# Patient Record
Sex: FEMALE | Race: WHITE | NOT HISPANIC OR LATINO | Employment: OTHER | URBAN - METROPOLITAN AREA
[De-identification: names, ages, dates, MRNs, and addresses within clinical notes are randomized per-mention and may not be internally consistent; named-entity substitution may affect disease eponyms.]

---

## 2017-01-31 ENCOUNTER — TELEPHONE (OUTPATIENT)
Dept: FAMILY MEDICINE | Facility: CLINIC | Age: 67
End: 2017-01-31

## 2017-01-31 NOTE — TELEPHONE ENCOUNTER
----- Message from Bin Molina sent at 1/31/2017 12:04 PM CST -----  Contact: pt  Pt is requesting a callback, needs to schedule an appt with doctor  Call Back#[phone]  Thanks

## 2017-01-31 NOTE — TELEPHONE ENCOUNTER
Trying to wing it being depressed quite  A bit. She started back on wellbutrin from a year ago. i made her an appointment with gisell soler.

## 2017-02-02 ENCOUNTER — OFFICE VISIT (OUTPATIENT)
Dept: FAMILY MEDICINE | Facility: CLINIC | Age: 67
End: 2017-02-02
Payer: MEDICARE

## 2017-02-02 VITALS
WEIGHT: 132.5 LBS | HEART RATE: 72 BPM | BODY MASS INDEX: 24.38 KG/M2 | DIASTOLIC BLOOD PRESSURE: 80 MMHG | HEIGHT: 62 IN | SYSTOLIC BLOOD PRESSURE: 128 MMHG

## 2017-02-02 DIAGNOSIS — F41.9 ANXIETY: ICD-10-CM

## 2017-02-02 DIAGNOSIS — F32.A DEPRESSION, UNSPECIFIED DEPRESSION TYPE: Primary | ICD-10-CM

## 2017-02-02 PROCEDURE — 99214 OFFICE O/P EST MOD 30 MIN: CPT | Mod: S$GLB,,, | Performed by: NURSE PRACTITIONER

## 2017-02-02 RX ORDER — BUPROPION HCL 300 MG
300 TABLET, EXTENDED RELEASE 24 HR ORAL DAILY
Qty: 30 TABLET | Refills: 6 | Status: SHIPPED | OUTPATIENT
Start: 2017-02-02 | End: 2017-05-11 | Stop reason: SDUPTHER

## 2017-02-06 NOTE — PROGRESS NOTES
"Subjective:       Patient ID: Isela Giraldo is a 66 y.o. female.    Chief Complaint: Depression    HPI states she is having a lot of anxiety and depression. Feels that she is not coping well at this time. Feeling overwhelmed. She has been on wellbutrin in the past and had some old 150mg tablets at home so she started taking those. States they are at least 3 years old so she does not know how effective they are because she does not feel  Much better. She states that when she was on the wellbutrin it did help a lot. She was taking 300mg in the past. It has been about a year since she last took it. States she has current 300mg prescription at home and just wanted opinion on starting back on this medication. She take xanax, low dose, at bedtime prn to help her sleep. States it helps her mind shut down so she is not constantly thinking about stuff. She denies thoughts of wanting to harm herself or others. See ROS.    The following portion of the patients history was reviewed and updated as appropriate: allergies, current medications, past medical and surgical history. Past social history and problem list reviewed. Family PMH and Past social history reviewed. Tobacco, Illicit drug use reviewed.     Review of Systems    Constitutional: No fatigue or fever    Respiratory:   No SOB, Wheezing, cough  Cardiovascular:   No CP, Palpitations  Gastrointestinal:   No N/V/D. No abdominal pain, weight stable. Appetite good.   Musculoskeletal:   No joint pain  No change in gait or coordination. .  Neurological:   No dizziness. No headaches  Hematological: No abnormal bruising or bleeding    Psychiatric/Behavioral Negative for suicidal ideas.  Positive for feelings of depression. No thoughts of wanting to harm self or others. feeing anxious, overwhelmed, stressed.     Objective:       Visit Vitals    /80    Pulse 72    Ht 5' 2" (1.575 m)    Wt 60.1 kg (132 lb 7.9 oz)    BMI 24.23 kg/m2     Physical Exam   "   Constitutional: oriented to person, place, and time. well-developed and well-nourished.   Eyes: Conjunctivae are normal. Pupils are equal, round, and reactive to light.   Neck: Normal range of motion. Neck supple. No tracheal deviation present. No thyromegaly present.   Cardiovascular: Normal rate, regular rhythm and normal heart sounds.    Pulmonary/Chest: Effort normal and breath sounds normal. No respiratory distress. No wheezes.   Abdominal: Soft. Bowel sounds are normal. No distension. There is no tenderness.   Musculoskeletal: Normal range of motion. Gait and coordination normal.   Neurological: oriented to person, place, and time.   Skin: Skin is warm and dry. No rashes or lesions  Psychiatric: Normal mood and affect.Behavior is normal. Judgment and thought content normal. she does not present as a threat to herself or others.  Assessment:       1. Depression, unspecified depression type    2. Anxiety        Plan:         Isela was seen today for depression.    Diagnoses and all orders for this visit:    Depression, unspecified depression type: start back on wellbutrin 300mg. States that she has to have Name Brand. She will follow up in about 3 weeks.     Anxiety  -     WELLBUTRIN  mg 24 hr tablet; Take 1 tablet (300 mg total) by mouth once daily. Patient requires Brand Name Wellbutrin since she does not respond well to the generic drug.    Continue current medication  Take medications only as prescribed  Healthy diet, exercise  Adequate rest  Adequate hydration  Avoid allergens  Avoid excessive caffeine

## 2017-03-17 ENCOUNTER — TELEPHONE (OUTPATIENT)
Dept: FAMILY MEDICINE | Facility: CLINIC | Age: 67
End: 2017-03-17

## 2017-03-17 RX ORDER — AMOXICILLIN AND CLAVULANATE POTASSIUM 875; 125 MG/1; MG/1
1 TABLET, FILM COATED ORAL 2 TIMES DAILY
Qty: 20 TABLET | Refills: 0 | Status: SHIPPED | OUTPATIENT
Start: 2017-03-17 | End: 2017-03-27

## 2017-03-17 NOTE — TELEPHONE ENCOUNTER
----- Message from Flower Devlin sent at 3/17/2017  1:07 PM CDT -----  Contact: self  Patient called regarding a refill of medication. Has an appt with Dayana Wilcox today and wanted to verify if her Augmentin medication has been sent to the pharmacy. If so the appt will not be needed. Please contact [phone] (home)     AUGMENTIN 875-125 mg per tablet    [address] [facility] [address]  [address] [facility] [address]  [facility] [address]  Phone: [phone] Fax: [phone]

## 2017-03-17 NOTE — TELEPHONE ENCOUNTER
Pt wants appt in [facility]. Nothing available.Pt was offered an appt in [place] but it is not until this afternoon and she will have to miss work to go to it. . Pt has had sinus pressure/ congestion x 1 mth. Pt has yellow sinus drainage Pt denies cough. Pt says that she runs low grade temp in evening off and on . Pt had some old Augmentin at home and took 1 tablet. Pt wants to know if you will send something in? Pt wants Augmentin sent in . Pt does not want Amoxil . Please advise.--lp

## 2017-03-27 DIAGNOSIS — G47.00 INSOMNIA: ICD-10-CM

## 2017-03-27 DIAGNOSIS — F41.9 ANXIETY: ICD-10-CM

## 2017-03-27 DIAGNOSIS — G47.00 INSOMNIA, UNSPECIFIED TYPE: Primary | ICD-10-CM

## 2017-03-27 RX ORDER — TRAZODONE HYDROCHLORIDE 150 MG/1
TABLET ORAL
Qty: 30 TABLET | Refills: 0 | OUTPATIENT
Start: 2017-03-27

## 2017-03-27 RX ORDER — ALPRAZOLAM 0.25 MG/1
0.25 TABLET ORAL 2 TIMES DAILY PRN
Qty: 60 TABLET | Refills: 3 | Status: SHIPPED | OUTPATIENT
Start: 2017-03-27 | End: 2017-07-24 | Stop reason: SDUPTHER

## 2017-03-27 RX ORDER — TRAZODONE HYDROCHLORIDE 150 MG/1
150 TABLET ORAL NIGHTLY
Qty: 30 TABLET | Refills: 6 | Status: SHIPPED | OUTPATIENT
Start: 2017-03-27 | End: 2018-03-15 | Stop reason: SDUPTHER

## 2017-03-27 RX ORDER — ALPRAZOLAM 0.25 MG/1
TABLET ORAL
Qty: 60 TABLET | Refills: 0 | OUTPATIENT
Start: 2017-03-27

## 2017-03-27 NOTE — TELEPHONE ENCOUNTER
----- Message from Sunitha Perla sent at 3/27/2017  1:21 PM CDT -----  Contact: Patient  Patient called requesting refill (trazadole) and alprazolam. Send to [facility]. mery Mejia. Please call back when script has been sent at [phone]. Thanks,

## 2017-04-24 ENCOUNTER — TELEPHONE (OUTPATIENT)
Dept: FAMILY MEDICINE | Facility: CLINIC | Age: 67
End: 2017-04-24

## 2017-04-24 NOTE — TELEPHONE ENCOUNTER
----- Message from Mayte Pierce sent at 4/24/2017  4:38 PM CDT -----  Contact: Patient  Placed call to Pod.  Returning the call about setting up an appointment with only Dr Vela.  Please call back at [phone].  Thank you

## 2017-05-11 ENCOUNTER — TELEPHONE (OUTPATIENT)
Dept: FAMILY MEDICINE | Facility: CLINIC | Age: 67
End: 2017-05-11

## 2017-05-11 ENCOUNTER — OFFICE VISIT (OUTPATIENT)
Dept: FAMILY MEDICINE | Facility: CLINIC | Age: 67
End: 2017-05-11
Payer: MEDICARE

## 2017-05-11 VITALS
SYSTOLIC BLOOD PRESSURE: 119 MMHG | BODY MASS INDEX: 23.98 KG/M2 | WEIGHT: 130.31 LBS | DIASTOLIC BLOOD PRESSURE: 70 MMHG | RESPIRATION RATE: 12 BRPM | OXYGEN SATURATION: 99 % | HEART RATE: 65 BPM | HEIGHT: 62 IN

## 2017-05-11 DIAGNOSIS — E78.1 PURE HYPERGLYCERIDEMIA: ICD-10-CM

## 2017-05-11 DIAGNOSIS — M81.0 OSTEOPOROSIS, POST-MENOPAUSAL: ICD-10-CM

## 2017-05-11 DIAGNOSIS — F41.9 ANXIETY: Primary | ICD-10-CM

## 2017-05-11 DIAGNOSIS — F51.01 PRIMARY INSOMNIA: ICD-10-CM

## 2017-05-11 PROCEDURE — 99999 PR PBB SHADOW E&M-EST. PATIENT-LVL III: CPT | Mod: PBBFAC,,,

## 2017-05-11 PROCEDURE — 99213 OFFICE O/P EST LOW 20 MIN: CPT | Mod: S$PBB,,,

## 2017-05-11 PROCEDURE — 99213 OFFICE O/P EST LOW 20 MIN: CPT | Mod: PBBFAC,PO

## 2017-05-11 RX ORDER — BUPROPION HCL 300 MG
300 TABLET, EXTENDED RELEASE 24 HR ORAL DAILY
Qty: 30 TABLET | Refills: 6 | Status: SHIPPED | OUTPATIENT
Start: 2017-05-11 | End: 2017-10-02 | Stop reason: SDUPTHER

## 2017-05-11 NOTE — TELEPHONE ENCOUNTER
Spoke with pt she said that she cannot take generic she tried it and it was ineffective for pt. It did not work at all.

## 2017-05-11 NOTE — PROGRESS NOTES
Subjective:       Patient ID: Isela Giraldo is a 66 y.o. female.    Chief Complaint: Med check    HPI Comments: This 66 y.o. WF comes in for a recheck of anxiety and depression syndrome.  She has had a lot of family stress lately and started back on Wellbutrin 300 mg XL which has helped a lot at this point.  She still uses Xanax and Trazodone.      Review of Systems   Constitutional: Negative for chills and fever.   HENT: Negative for congestion and sore throat.    Respiratory: Negative for cough, choking, chest tightness, shortness of breath and wheezing.    Cardiovascular: Negative for chest pain, palpitations and leg swelling.   Gastrointestinal: Negative for abdominal pain and blood in stool.   Genitourinary: Negative for difficulty urinating.   Musculoskeletal: Negative for back pain and neck pain.   Skin: Negative for rash.   Neurological: Negative for dizziness, seizures, syncope and headaches.   Hematological: Negative for adenopathy.   Psychiatric/Behavioral: Positive for sleep disturbance. Negative for confusion.       Objective:      Physical Exam   Constitutional: She is oriented to person, place, and time. She appears well-developed and well-nourished. No distress.   HENT:   Head: Normocephalic and atraumatic.   Eyes: EOM are normal. Pupils are equal, round, and reactive to light. No scleral icterus.   Neck: Normal range of motion. Neck supple. No JVD present. Carotid bruit is not present. No thyromegaly present.   Cardiovascular: Normal rate, regular rhythm, normal heart sounds and intact distal pulses.  Exam reveals no gallop and no friction rub.    No murmur heard.  Pulmonary/Chest: Effort normal and breath sounds normal. She has no wheezes. She has no rales.   Abdominal: Soft. Bowel sounds are normal. She exhibits no mass. There is no guarding.   Musculoskeletal: Normal range of motion. She exhibits no tenderness.   Lymphadenopathy:     She has no cervical adenopathy.   Neurological: She is  alert and oriented to person, place, and time. She has normal reflexes. Coordination normal.   Skin: Skin is warm and dry. No rash noted.   Psychiatric: She has a normal mood and affect. Her behavior is normal. Judgment and thought content normal.   Vitals reviewed.      Assessment:       1. Anxiety    2. Primary insomnia    3. Pure hyperglyceridemia    4. Osteoporosis, post-menopausal        Plan:   The patient will continue her current medications including the Wellbutrin 300 mg XL daily.  I would like to see her again in about 3-4 months for a recheck.

## 2017-05-11 NOTE — TELEPHONE ENCOUNTER
----- Message from Sunitha Perla sent at 5/11/2017  3:14 PM CDT -----  Contact: Patient  Place call to pod,Patient returning call. Please call back at [phone]. Thanks,

## 2017-05-11 NOTE — TELEPHONE ENCOUNTER
----- Message from Reji Rice sent at 5/11/2017  2:30 PM CDT -----  Contact: same  Patient called in and stated that Dr. Vela gave her the Rx for her Wellbutrin but pharmacy is stating it requires a prior auth.      [address]   [facility] [address]  Phone: [phone] Fax: [phone]    Patient call back number is [phone]

## 2017-05-11 NOTE — MR AVS SNAPSHOT
[facility]  [address] [facility] [address]  [place] [address]  Phone: [phone]  Fax: [phone]                  Isela Giraldo   2017 10:20 AM   Office Visit    Description:  Female : 1950   Provider:  MANISHA Vela Jr., MD   Department:  [facility]           Reason for Visit     Med check           Diagnoses this Visit        Comments    Anxiety    -  Primary     Primary insomnia         Pure hyperglyceridemia         Osteoporosis, post-menopausal                To Do List           Goals (5 Years of Data)     None      Follow-Up and Disposition     Return in about 3 months (around 2017).       These Medications        Disp Refills Start End    WELLBUTRIN  mg 24 hr tablet 30 tablet 6 2017     Take 1 tablet (300 mg total) by mouth once daily. Patient requires Brand Name Wellbutrin since she does not respond well to the generic drug. - Oral    Pharmacy: [address]   #: [phone]         [facility] On Call     [facility] Nurse Care Line -  Assistance  Unless otherwise directed by your provider, please contact [facility], our nurse care line that is available for  assistance.     Registered nurses in the [facility] provide: appointment scheduling, clinical advisement, health education, and other advisory services.  Call: [phone] (toll free)               Medications           Message regarding Medications     Verify the changes and/or additions to your medication regime listed below are the same as discussed with your clinician today.  If any of these changes or additions are incorrect, please notify your healthcare provider.             Verify that the below list of medications is an accurate representation of the medications you are currently taking.  If none reported, the list may be blank. If incorrect, please contact your healthcare  "provider. Carry this list with you in case of emergency.           Current Medications     albuterol 90 mcg/actuation inhaler Inhale 1 puff into the lungs every 4 (four) hours as needed.    alprazolam (XANAX) 0.25 MG tablet Take 1 tablet (0.25 mg total) by mouth 2 (two) times daily as needed for Insomnia or Anxiety.    mometasone (NASONEX) 50 mcg/actuation nasal spray 2 sprays by Nasal route once daily.    trazodone (DESYREL) 150 MG tablet Take 1 tablet (150 mg total) by mouth every evening.    WELLBUTRIN  mg 24 hr tablet Take 1 tablet (300 mg total) by mouth once daily. Patient requires Brand Name Wellbutrin since she does not respond well to the generic drug.           Clinical Reference Information           Your Vitals Were     BP Pulse Resp Height Weight SpO2    119/70 (BP Location: Right arm, Patient Position: Sitting, BP Method: Manual) 65 12 5' 2" (1.575 m) 59.1 kg (130 lb 4.7 oz) 99%    BMI                23.83 kg/m2          Blood Pressure          Most Recent Value    BP  119/70      Allergies as of 5/11/2017     Fosamax [Alendronate]    Ramelteon      Immunizations Administered on Date of Encounter - 5/11/2017     None      [facility] Sign-Up     Activating your [facility] account is as easy as 1-2-3!     1) Visit [email or web address], select Sign Up Now, enter this activation code and your date of birth, then select Next.  [email or web address]  Expires: 6/25/2017 11:51 AM      2) Create a username and password to use when you visit [facility] in the future and select a security question in case you lose your password and select Next.    3) Enter your e-mail address and click Sign Up!    Additional Information  If you have questions, please e-mail [email or web address] or call [phone] to talk to our [facility] staff. Remember, [facility] is NOT to be used for urgent needs. For medical emergencies, dial 911.         Language Assistance Services     ATTENTION: Language assistance services are available, free " of charge. Please call [phone].      ATENCIÓN: Si habla español, tiene a mitchell disposición servicios gratuitos de asistencia lingüística. Llame al [phone].     CHÚ Ý: N?u b?n nói Ti?ng Vi?t, có các d?ch v? h? tr? ngôn ng? mi?n phí dành cho b?n. G?i s? [phone].         [facility] complies with applicable Federal civil rights laws and does not discriminate on the basis of race, color, national origin, age, disability, or sex.

## 2017-05-12 NOTE — TELEPHONE ENCOUNTER
i got approval for this medication. Formulary exception.      The wellbutrin  mg name brand is $600.   This is an FYI

## 2017-07-24 DIAGNOSIS — F41.9 ANXIETY: ICD-10-CM

## 2017-07-24 RX ORDER — ALPRAZOLAM 0.25 MG/1
0.25 TABLET ORAL 2 TIMES DAILY PRN
Qty: 60 TABLET | Refills: 3 | Status: SHIPPED | OUTPATIENT
Start: 2017-07-24 | End: 2017-11-28 | Stop reason: SDUPTHER

## 2017-07-24 NOTE — TELEPHONE ENCOUNTER
----- Message from Renay Morel sent at 7/24/2017 10:57 AM CDT -----  Refill on Rx Zanex.  Please send into [facility].  Any questions call [phone].

## 2017-09-25 ENCOUNTER — TELEPHONE (OUTPATIENT)
Dept: FAMILY MEDICINE | Facility: CLINIC | Age: 67
End: 2017-09-25

## 2017-09-25 NOTE — TELEPHONE ENCOUNTER
----- Message from Nikki Lanza sent at 9/25/2017  3:07 PM CDT -----  Contact: self  Patient is returning call.  Please call patient at [phone].  Thanks!

## 2017-10-02 ENCOUNTER — OFFICE VISIT (OUTPATIENT)
Dept: FAMILY MEDICINE | Facility: CLINIC | Age: 67
End: 2017-10-02
Payer: MEDICARE

## 2017-10-02 VITALS
BODY MASS INDEX: 23.28 KG/M2 | DIASTOLIC BLOOD PRESSURE: 78 MMHG | RESPIRATION RATE: 16 BRPM | WEIGHT: 126.5 LBS | SYSTOLIC BLOOD PRESSURE: 126 MMHG | HEART RATE: 84 BPM | HEIGHT: 62 IN

## 2017-10-02 DIAGNOSIS — M81.0 OSTEOPOROSIS, UNSPECIFIED OSTEOPOROSIS TYPE, UNSPECIFIED PATHOLOGICAL FRACTURE PRESENCE: Chronic | ICD-10-CM

## 2017-10-02 DIAGNOSIS — F41.9 ANXIETY: Primary | ICD-10-CM

## 2017-10-02 DIAGNOSIS — Z12.39 BREAST CANCER SCREENING: ICD-10-CM

## 2017-10-02 DIAGNOSIS — F41.9 ANXIETY: ICD-10-CM

## 2017-10-02 DIAGNOSIS — E78.1 PURE HYPERGLYCERIDEMIA: ICD-10-CM

## 2017-10-02 DIAGNOSIS — F51.01 PRIMARY INSOMNIA: ICD-10-CM

## 2017-10-02 PROCEDURE — 1126F AMNT PAIN NOTED NONE PRSNT: CPT | Mod: ,,,

## 2017-10-02 PROCEDURE — 99214 OFFICE O/P EST MOD 30 MIN: CPT | Mod: PBBFAC,PO

## 2017-10-02 PROCEDURE — 1159F MED LIST DOCD IN RCRD: CPT | Mod: ,,,

## 2017-10-02 PROCEDURE — 99999 PR PBB SHADOW E&M-EST. PATIENT-LVL IV: CPT | Mod: PBBFAC,,,

## 2017-10-02 PROCEDURE — 99213 OFFICE O/P EST LOW 20 MIN: CPT | Mod: S$PBB,,,

## 2017-10-02 RX ORDER — BUPROPION HCL 300 MG
300 TABLET, EXTENDED RELEASE 24 HR ORAL DAILY
Qty: 90 TABLET | Refills: 3 | Status: SHIPPED | OUTPATIENT
Start: 2017-10-02 | End: 2018-03-15 | Stop reason: SDUPTHER

## 2017-10-02 NOTE — PROGRESS NOTES
Subjective:       Patient ID: Isela Giraldo is a 67 y.o. female.    Chief Complaint: Anxiety (follow up.Hm due tetanus,DEXA,Mammo. Flu shot,pneumo,colonoscopy(declined))    This 67 y.o. WF comes in for a recheck of anxiety.  She says she is going through a lot of personal issues currently, but is doing fairly well.  She is still on her Wellbutrin and Desyrel.  The only thing that helps her sleep is Xanax at night and she has not been escalating this.  She is not having any chest pain or shortness of breath.  She only uses her albuterol rarely.      Anxiety   Patient reports no chest pain, confusion, dizziness, palpitations, shortness of breath or suicidal ideas.         Review of Systems   Constitutional: Negative for chills, fever and unexpected weight change.   HENT: Negative for congestion and sore throat.    Respiratory: Negative for apnea, cough, choking, chest tightness, shortness of breath and wheezing.    Cardiovascular: Negative for chest pain, palpitations and leg swelling.   Gastrointestinal: Negative for abdominal pain and blood in stool.   Genitourinary: Negative for difficulty urinating.   Musculoskeletal: Negative for arthralgias, back pain and neck pain.   Skin: Negative for rash.   Neurological: Negative for dizziness, seizures, syncope and headaches.   Hematological: Negative for adenopathy.   Psychiatric/Behavioral: Positive for sleep disturbance (Chronically). Negative for confusion and suicidal ideas.       Objective:      Physical Exam   Constitutional: She is oriented to person, place, and time. She appears well-developed and well-nourished. No distress.   HENT:   Head: Normocephalic and atraumatic.   Nose: Nose normal.   Mouth/Throat: Oropharynx is clear and moist. No oropharyngeal exudate.   Eyes: EOM are normal. Pupils are equal, round, and reactive to light. No scleral icterus.   Neck: Neck supple. No JVD present. Carotid bruit is not present. No thyromegaly present.    Cardiovascular: Normal rate, regular rhythm, normal heart sounds and intact distal pulses.  Exam reveals no gallop and no friction rub.    No murmur heard.  Pulmonary/Chest: Effort normal. No respiratory distress. She has no wheezes. She has no rales.   Abdominal: Bowel sounds are normal. She exhibits no distension and no mass. There is no guarding.   Musculoskeletal: Normal range of motion. She exhibits no edema.   Lymphadenopathy:     She has no cervical adenopathy.   Neurological: She is alert and oriented to person, place, and time. She displays normal reflexes. No cranial nerve deficit.   Skin: Skin is warm and dry. No rash noted.   Psychiatric: She has a normal mood and affect. Her behavior is normal. Judgment and thought content normal.   Vitals reviewed.      Assessment:       1. Anxiety    2. Primary insomnia    3. Pure hyperglyceridemia    4. Osteoporosis, unspecified osteoporosis type, unspecified pathological fracture presence    5. Breast cancer screening        Plan:   The patient will continue her current medications and I would like to see her again in about 3 months or sooner if needed.  We will order her mammogram and BMD Scan.  She will get TDaP in the future.

## 2017-11-02 ENCOUNTER — HOSPITAL ENCOUNTER (OUTPATIENT)
Dept: RADIOLOGY | Facility: HOSPITAL | Age: 67
Discharge: HOME | End: 2017-11-02
Payer: MEDICARE

## 2017-11-02 DIAGNOSIS — M81.0 OSTEOPOROSIS, UNSPECIFIED OSTEOPOROSIS TYPE, UNSPECIFIED PATHOLOGICAL FRACTURE PRESENCE: Chronic | ICD-10-CM

## 2017-11-02 DIAGNOSIS — Z12.39 BREAST CANCER SCREENING: ICD-10-CM

## 2017-11-02 PROCEDURE — 77080 DXA BONE DENSITY AXIAL: CPT | Mod: 26,,, | Performed by: RADIOLOGY

## 2017-11-02 PROCEDURE — 77080 DXA BONE DENSITY AXIAL: CPT | Mod: TC,PO

## 2017-11-02 PROCEDURE — 77067 SCR MAMMO BI INCL CAD: CPT | Mod: 26,,, | Performed by: RADIOLOGY

## 2017-11-02 PROCEDURE — 77067 SCR MAMMO BI INCL CAD: CPT | Mod: TC

## 2017-11-02 PROCEDURE — 77063 BREAST TOMOSYNTHESIS BI: CPT | Mod: 26,,, | Performed by: RADIOLOGY

## 2017-11-14 ENCOUNTER — OFFICE VISIT (OUTPATIENT)
Dept: FAMILY MEDICINE | Facility: CLINIC | Age: 67
End: 2017-11-14
Payer: MEDICARE

## 2017-11-14 VITALS
OXYGEN SATURATION: 98 % | HEART RATE: 78 BPM | TEMPERATURE: 99 F | RESPIRATION RATE: 16 BRPM | HEIGHT: 62 IN | BODY MASS INDEX: 23.55 KG/M2 | WEIGHT: 128 LBS | DIASTOLIC BLOOD PRESSURE: 70 MMHG | SYSTOLIC BLOOD PRESSURE: 130 MMHG

## 2017-11-14 DIAGNOSIS — R51.9 SINUS HEADACHE: ICD-10-CM

## 2017-11-14 DIAGNOSIS — J30.1 ACUTE ALLERGIC RHINITIS DUE TO POLLEN, UNSPECIFIED SEASONALITY: ICD-10-CM

## 2017-11-14 DIAGNOSIS — J01.00 ACUTE MAXILLARY SINUSITIS, RECURRENCE NOT SPECIFIED: Primary | ICD-10-CM

## 2017-11-14 PROCEDURE — 99214 OFFICE O/P EST MOD 30 MIN: CPT | Mod: S$GLB,,, | Performed by: NURSE PRACTITIONER

## 2017-11-14 RX ORDER — MUPIROCIN 20 MG/G
OINTMENT TOPICAL 2 TIMES DAILY
Qty: 30 G | Refills: 0 | Status: SHIPPED | OUTPATIENT
Start: 2017-11-14 | End: 2018-08-17

## 2017-11-14 RX ORDER — AMOXICILLIN AND CLAVULANATE POTASSIUM 875; 125 MG/1; MG/1
1 TABLET, FILM COATED ORAL 2 TIMES DAILY
Qty: 20 TABLET | Refills: 0 | Status: SHIPPED | OUTPATIENT
Start: 2017-11-14 | End: 2017-11-24

## 2017-11-14 NOTE — PROGRESS NOTES
"Subjective:       Patient ID: Isela Giraldo is a 67 y.o. female.    Chief Complaint: Sinus Problem    HPI onset for over a week. Sinus congestion, sore throat, sinus area pressure. Sinus headache. Ears with pressure. Using nasonex. She is going out of town and concerned about still feeling bad. See ROS.    The following portion of the patients history was reviewed and updated as appropriate: allergies, current medications, past medical and surgical history. Past social history and problem list reviewed. Family PMH and Past social history reviewed. Tobacco, Illicit drug use reviewed.     Review of Systems   Constitutional: Positive for fatigue. Negative for chills and fever.   HENT: Positive for congestion, postnasal drip, rhinorrhea, sinus pain, sinus pressure, sneezing and sore throat.    Eyes: Negative for visual disturbance.   Respiratory: Positive for cough. Negative for shortness of breath and wheezing.    Cardiovascular: Negative for chest pain and palpitations.   Gastrointestinal: Negative for abdominal pain, constipation, diarrhea, nausea and vomiting.   Musculoskeletal: Positive for myalgias.   Neurological: Positive for headaches (sinus pressure).       Objective:     /70   Pulse 78   Temp 98.7 °F (37.1 °C) (Oral)   Resp 16   Ht 5' 2" (1.575 m)   Wt 58.1 kg (128 lb)   SpO2 98%   BMI 23.41 kg/m²      Physical Exam     Constitutional: oriented to person, place, and time. well-developed and well-nourished.   HENT: nares with bilateral congestion. Sinus area tenderness. Canals and TM clear. Throat with erythema and thick PND. No exudate.  Head: Normocephalic.   Eyes: Conjunctivae are normal. Pupils are equal, round, and reactive to light.   Neck: Normal range of motion. Neck supple. No tracheal deviation present. No thyromegaly present. anterior cervical lymph nodes are tender and enlarged.  Cardiovascular: Normal rate, regular rhythm and normal heart sounds.    Pulmonary/Chest: Effort " normal and breath sounds normal. No respiratory distress. No wheezes.   Abdominal: Soft. Bowel sounds are normal. No distension. There is no tenderness.   Musculoskeletal: Normal range of motion. Gait and coordination normal.     Assessment:       1. Acute maxillary sinusitis, recurrence not specified    2. Acute allergic rhinitis due to pollen, unspecified seasonality    3. Sinus headache        Plan:         Isela was seen today for sinus problem.    Diagnoses and all orders for this visit:    Acute maxillary sinusitis, recurrence not specified: due to duration and presenting exam will cover with antibiotics. Take as directed. Use nasonex daily.     Acute allergic rhinitis due to pollen, unspecified seasonality    Sinus headache    Other orders  -     amoxicillin-clavulanate 875-125mg (AUGMENTIN) 875-125 mg per tablet; Take 1 tablet by mouth 2 (two) times daily.  -     mupirocin (BACTROBAN) 2 % ointment; by Nasal route 2 (two) times daily.  Place in nares before flying.    Continue current medication  Take medications only as prescribed  Healthy diet  Adequate rest  Adequate hydration  Avoid allergens  Avoid excessive caffeine

## 2017-11-28 DIAGNOSIS — F41.9 ANXIETY: ICD-10-CM

## 2017-11-28 RX ORDER — ALPRAZOLAM 0.25 MG/1
TABLET ORAL
Qty: 60 TABLET | Refills: 3 | Status: SHIPPED | OUTPATIENT
Start: 2017-11-28 | End: 2018-03-15 | Stop reason: SDUPTHER

## 2017-12-11 ENCOUNTER — TELEPHONE (OUTPATIENT)
Dept: PRIMARY CARE CLINIC | Facility: CLINIC | Age: 67
End: 2017-12-11

## 2017-12-11 ENCOUNTER — OFFICE VISIT (OUTPATIENT)
Dept: PRIMARY CARE CLINIC | Facility: CLINIC | Age: 67
End: 2017-12-11
Payer: MEDICARE

## 2017-12-11 VITALS
WEIGHT: 127.5 LBS | TEMPERATURE: 99 F | OXYGEN SATURATION: 97 % | HEART RATE: 78 BPM | BODY MASS INDEX: 23.46 KG/M2 | SYSTOLIC BLOOD PRESSURE: 134 MMHG | HEIGHT: 62 IN | DIASTOLIC BLOOD PRESSURE: 78 MMHG

## 2017-12-11 DIAGNOSIS — J32.9 RHINOSINUSITIS: Primary | ICD-10-CM

## 2017-12-11 DIAGNOSIS — R51.9 SINUS HEADACHE: ICD-10-CM

## 2017-12-11 DIAGNOSIS — R09.81 NASAL CONGESTION: ICD-10-CM

## 2017-12-11 DIAGNOSIS — J34.89 RHINORRHEA: ICD-10-CM

## 2017-12-11 PROCEDURE — 99214 OFFICE O/P EST MOD 30 MIN: CPT | Mod: S$PBB,,, | Performed by: NURSE PRACTITIONER

## 2017-12-11 PROCEDURE — 99999 PR PBB SHADOW E&M-EST. PATIENT-LVL IV: CPT | Mod: PBBFAC,,, | Performed by: NURSE PRACTITIONER

## 2017-12-11 PROCEDURE — 99214 OFFICE O/P EST MOD 30 MIN: CPT | Mod: PBBFAC,PO | Performed by: NURSE PRACTITIONER

## 2017-12-11 RX ORDER — AMOXICILLIN AND CLAVULANATE POTASSIUM 875; 125 MG/1; MG/1
1 TABLET, FILM COATED ORAL 2 TIMES DAILY
Qty: 20 TABLET | Refills: 0 | Status: SHIPPED | OUTPATIENT
Start: 2017-12-11 | End: 2017-12-21

## 2017-12-11 RX ORDER — AZELASTINE 1 MG/ML
2 SPRAY, METERED NASAL 2 TIMES DAILY
Qty: 30 ML | Refills: 0 | Status: SHIPPED | OUTPATIENT
Start: 2017-12-11 | End: 2018-03-15

## 2017-12-11 NOTE — PATIENT INSTRUCTIONS
Understanding Sinus Problems    You dont often think about your sinuses until theres a problem. One day you realize you cant smell dinner cooking. Or you find you often have headaches or problems breathing through your nose.  Symptoms of sinus problems  Sinus problems can cause uncomfortable symptoms. Your nose may run constantly. You might have trouble sleeping at night. You may even lose your sense of smell. Other symptoms can include:  · Nasal congestion  · Fullness in ears  · Green, yellow, or bloody drainage from the nose  · Trouble tasting food  · Frequent headaches  · Facial pain  · Cough  When sinuses are blocked  If something blocks the passages in the nose or sinuses, mucus cant drain. Mucus-filled sinuses often become infected.  · Colds cause the lining of the nose and sinuses to swell and make extra mucus. A buildup of mucus can lead to a more serious infection.  · Allergies irritate turbinates and other tissues. This causes swelling, which can cause a blockage. Over time, this irritation can also lead to sacs of swollen tissue (polyps).  · Polyps may form in both the sinuses and nose. Polyps can grow large enough to clog nasal passages and block drainage.  · A crooked (deviated) septum may block nasal passages. This is often the result of an injury.  Date Last Reviewed: 11/1/2016  © 0726-8885 [employer]. [address] [place] [address], [place] [address]. All rights reserved. This information is not intended as a substitute for professional medical care. Always follow your healthcare professional's instructions.    If you are not better in 3 days, if worse or you have concerns or questions, please do not hesitate to call.  You can reach us at [phone] Monday through Friday (except holidays) 12 nooon to 8 p.m.    Thank you for using the [facility]!    [facility] [place], [facility]  [facility]  [facility]

## 2017-12-11 NOTE — TELEPHONE ENCOUNTER
----- Message from Heidi Kemp sent at 12/11/2017 12:00 PM CST -----  Contact: patient  Patient calling to let the office she will be about 15 min late for her appt. She is getting on I-[address] now. Please advise.   Call back   Thanks!

## 2017-12-11 NOTE — PROGRESS NOTES
Subjective:       Patient ID: Isela Giraldo is a 67 y.o. female.    Chief Complaint: Pressure Behind the Eyes (within the week) and Nasal Congestion    Patient who is new to me presents with sinus pressure and nasal congestion. States she frequently gets sinus infections.       Sinus Problem   This is a new problem. The current episode started in the past 7 days. The problem has been gradually worsening since onset. Maximum temperature: feels feverish. The pain is mild. Associated symptoms include congestion, coughing, headaches, a hoarse voice and sinus pressure. Pertinent negatives include no chills, diaphoresis, ear pain, neck pain, shortness of breath, sneezing, sore throat or swollen glands. Past treatments include spray decongestants. The treatment provided mild relief.     Review of Systems   Constitutional: Negative for chills, diaphoresis, fatigue and fever.   HENT: Positive for congestion, hoarse voice, rhinorrhea, sinus pain and sinus pressure. Negative for ear pain, sneezing and sore throat.    Respiratory: Positive for cough. Negative for chest tightness, shortness of breath and wheezing.    Cardiovascular: Negative for chest pain, palpitations and leg swelling.   Gastrointestinal: Negative for abdominal distention, abdominal pain, constipation, diarrhea, nausea and vomiting.   Genitourinary: Negative for decreased urine volume, difficulty urinating, dysuria, frequency and urgency.   Musculoskeletal: Negative for arthralgias, gait problem, joint swelling, myalgias and neck pain.   Skin: Negative for rash and wound.   Neurological: Positive for headaches. Negative for dizziness, light-headedness and numbness.       Objective:      Physical Exam   Constitutional: She is oriented to person, place, and time. Vital signs are normal. She appears well-developed and well-nourished. She is cooperative.   HENT:   Head: Normocephalic and atraumatic.   Right Ear: Hearing, tympanic membrane, external ear  and ear canal normal.   Left Ear: Hearing, tympanic membrane, external ear and ear canal normal.   Nose: Rhinorrhea present. Right sinus exhibits maxillary sinus tenderness. Right sinus exhibits no frontal sinus tenderness. Left sinus exhibits maxillary sinus tenderness. Left sinus exhibits no frontal sinus tenderness.   Mouth/Throat: Posterior oropharyngeal erythema present.   Eyes: Pupils are equal, round, and reactive to light.   Neck: Normal range of motion.   Cardiovascular: Normal rate, regular rhythm, normal heart sounds and intact distal pulses.    Pulmonary/Chest: Effort normal and breath sounds normal.   Abdominal: Soft. Bowel sounds are normal.   Musculoskeletal: Normal range of motion.   Lymphadenopathy:        Head (right side): No submental, no submandibular, no tonsillar, no preauricular, no posterior auricular and no occipital adenopathy present.        Head (left side): No submental, no submandibular, no tonsillar, no preauricular, no posterior auricular and no occipital adenopathy present.     She has no cervical adenopathy.   Neurological: She is alert and oriented to person, place, and time.   Skin: Skin is warm, dry and intact.   Nursing note and vitals reviewed.      Assessment:       1. Rhinosinusitis    2. Nasal congestion    3. Rhinorrhea    4. Sinus headache        Plan:       Rhinosinusitis  -     azelastine (ASTELIN) 137 mcg (0.1 %) nasal spray; 2 sprays (274 mcg total) by Nasal route 2 (two) times daily.  Dispense: 30 mL; Refill: 0  -     amoxicillin-clavulanate 875-125mg (AUGMENTIN) 875-125 mg per tablet; Take 1 tablet by mouth 2 (two) times daily.  Dispense: 20 tablet; Refill: 0    Nasal congestion  -     azelastine (ASTELIN) 137 mcg (0.1 %) nasal spray; 2 sprays (274 mcg total) by Nasal route 2 (two) times daily.  Dispense: 30 mL; Refill: 0    Rhinorrhea  -     azelastine (ASTELIN) 137 mcg (0.1 %) nasal spray; 2 sprays (274 mcg total) by Nasal route 2 (two) times daily.  Dispense: 30  mL; Refill: 0    Sinus headache  -     azelastine (ASTELIN) 137 mcg (0.1 %) nasal spray; 2 sprays (274 mcg total) by Nasal route 2 (two) times daily.  Dispense: 30 mL; Refill: 0    Patient instructed to try symptomatic treatment and to obtain sinus xrays. Patient states she does not want xrays at this time. Patient instructed to try astelin spray along with nasal rinses, and if no improvement in 3 days to call clinic. Patient states she does not want to have to call back would like to have antibiotics in hand if these treatments do not work.

## 2018-01-29 ENCOUNTER — OFFICE VISIT (OUTPATIENT)
Dept: URGENT CARE | Facility: CLINIC | Age: 68
End: 2018-01-29
Payer: MEDICARE

## 2018-01-29 VITALS
RESPIRATION RATE: 18 BRPM | BODY MASS INDEX: 23.37 KG/M2 | SYSTOLIC BLOOD PRESSURE: 135 MMHG | HEIGHT: 62 IN | HEART RATE: 79 BPM | WEIGHT: 127 LBS | TEMPERATURE: 99 F | DIASTOLIC BLOOD PRESSURE: 77 MMHG | OXYGEN SATURATION: 97 %
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
## 2018-01-29 PROCEDURE — 99214 OFFICE O/P EST MOD 30 MIN: CPT | Mod: S$GLB,,, | Performed by: FAMILY MEDICINE

## 2018-01-29 RX ORDER — AZITHROMYCIN 250 MG/1
TABLET, FILM COATED ORAL
Qty: 6 TABLET | Refills: 0 | Status: SHIPPED | OUTPATIENT
Start: 2018-01-29 | End: 2018-02-02

## 2018-01-29 NOTE — PROGRESS NOTES
"Subjective:       Patient ID: Isela Giraldo is a 67 y.o. female.    Vitals:  height is 5' 2" (1.575 m) and weight is 57.6 kg (127 lb). Her temperature is 98.9 °F (37.2 °C). Her blood pressure is 135/77 and her pulse is 79. Her respiration is 18 and oxygen saturation is 97%.     Chief Complaint: Animal Bite    Patient was scratched on chest by a cat.       Animal Bite    The incident occurred today. The incident occurred at home. There is an injury to the chest. Pertinent negatives include no chest pain, no abdominal pain, no nausea, no vomiting and no headaches.     Review of Systems   Constitution: Negative for chills and fever.   HENT: Negative for sore throat.    Eyes: Negative for blurred vision.   Cardiovascular: Negative for chest pain.   Respiratory: Negative for shortness of breath.    Skin: Positive for color change and poor wound healing. Negative for rash.   Musculoskeletal: Negative for back pain and joint pain.   Gastrointestinal: Negative for abdominal pain, diarrhea, nausea and vomiting.   Neurological: Negative for headaches.   Psychiatric/Behavioral: The patient is not nervous/anxious.        Objective:      Physical Exam   Constitutional: She is oriented to person, place, and time. She appears well-developed and well-nourished.   HENT:   Head: Normocephalic and atraumatic. Head is without abrasion, without contusion and without laceration.   Eyes: Conjunctivae, EOM and lids are normal. Pupils are equal, round, and reactive to light.   Neck: Trachea normal, full passive range of motion without pain and phonation normal. Neck supple.   Cardiovascular: Normal rate, regular rhythm and normal heart sounds.    Pulmonary/Chest: Effort normal and breath sounds normal. No stridor. No respiratory distress.   Musculoskeletal: Normal range of motion.   Neurological: She is alert and oriented to person, place, and time.   Skin: Skin is warm, dry and intact. Capillary refill takes less than 2 seconds. " Lesion noted. No abrasion, no bruising, no burn, no ecchymosis, no laceration and no rash noted. There is erythema (scratch to right ant chest wall. no active signs of infection).   Psychiatric: She has a normal mood and affect. Her speech is normal and behavior is normal. Judgment and thought content normal. Cognition and memory are normal.   Nursing note and vitals reviewed.      Assessment:       1. Cat scratch        Plan:         Cat scratch    Other orders  -     azithromycin (ZITHROMAX) 250 MG tablet; Take 2 pills on day one, then one pill each day until completed  Dispense: 6 tablet; Refill: 0

## 2018-02-02 ENCOUNTER — OFFICE VISIT (OUTPATIENT)
Dept: FAMILY MEDICINE | Facility: CLINIC | Age: 68
End: 2018-02-02
Payer: MEDICARE

## 2018-02-02 VITALS
HEIGHT: 62 IN | BODY MASS INDEX: 23.55 KG/M2 | SYSTOLIC BLOOD PRESSURE: 122 MMHG | HEART RATE: 64 BPM | RESPIRATION RATE: 12 BRPM | DIASTOLIC BLOOD PRESSURE: 66 MMHG | TEMPERATURE: 99 F | WEIGHT: 128 LBS

## 2018-02-02 DIAGNOSIS — F41.9 ANXIETY: ICD-10-CM

## 2018-02-02 DIAGNOSIS — E78.2 MIXED HYPERLIPIDEMIA: Primary | ICD-10-CM

## 2018-02-02 DIAGNOSIS — F32.A DEPRESSION, UNSPECIFIED DEPRESSION TYPE: ICD-10-CM

## 2018-02-02 PROCEDURE — 99214 OFFICE O/P EST MOD 30 MIN: CPT | Mod: S$GLB,,, | Performed by: NURSE PRACTITIONER

## 2018-02-02 PROCEDURE — 1159F MED LIST DOCD IN RCRD: CPT | Mod: S$GLB,,, | Performed by: NURSE PRACTITIONER

## 2018-02-02 PROCEDURE — 1126F AMNT PAIN NOTED NONE PRSNT: CPT | Mod: S$GLB,,, | Performed by: NURSE PRACTITIONER

## 2018-02-02 NOTE — PROGRESS NOTES
Subjective:       Patient ID: Isela Giraldo is a 67 y.o. female.    Chief Complaint: discuss medications    HPI she is here to discuss her medications. She  Has long history of anxiety. States she is currently taking 1/2 of the wellbutrin in the morning. States the whole dose made her nervous and dizzy. She has been thinking about going back on prozac which she was on in the past. She states the 1/2 tablet only holds her for 1/2 the day. I recommended she try 150mg BID and then if that did not work for her can change to prozac. She is due for her routine labs.  She also has questions about her xanax refills. Her provider has passed away and will no longer be filling her meds. She states she has been on the xanax for many years now and does not abuse them.  States she only takes them at bedtime. I explained that I would have to speak with the MD provider here in my clinic and follow up with her on that issue. She is doing through a divorce at this time and under more stress. No other concerns. See ROS.    The following portion of the patients history was reviewed and updated as appropriate: allergies, current medications, past medical and surgical history. Past social history and problem list reviewed. Family PMH and Past social history reviewed. Tobacco, Illicit drug use reviewed.     Review of Systems  Constitutional: No fatigue or fever    HENT: no sore throat or nasal congestion. No voice changes    Eyes: No vision changes, blurred vision  Skin: no rashes or lesions  Respiratory:   No SOB, Wheezing, cough  Cardiovascular:   No CP, Palpitations  Gastrointestinal:   No N/V/D. No abdominal pain, weight stable. Appetite good.   Genitourinary:   No dysuria, urgency or frequency. No change in bowels. No blood in stools.   Musculoskeletal:   No joint pain  No change in gait or coordination. .  Neurological:   No dizziness. No headaches  Hematological: No abnormal bruising or bleeding    Psychiatric/Behavioral  "Negative for suicidal ideas.  Denies feelings of depression. No thoughts of wanting to harm self or others.     Objective:     /66   Pulse 64   Temp 98.7 °F (37.1 °C) (Oral)   Resp 12   Ht 5' 2" (1.575 m)   Wt 58.1 kg (128 lb) Comment: per the patient  BMI 23.41 kg/m²      Physical Exam    Constitutional: oriented to person, place, and time. well-developed and well-nourished.   Cardiovascular: Normal rate, regular rhythm and normal heart sounds.    Pulmonary/Chest: Effort normal and breath sounds normal. No respiratory distress. No wheezes.   Musculoskeletal: Normal range of motion. Gait and coordination normal.   Neurological: oriented to person, place, and time.   Skin: Skin is warm and dry. No rashes or lesions  Psychiatric: Normal mood and affect.Behavior is normal. Judgment and thought content normal. she does not present as a threat to herself or others.  Assessment:       1. Mixed hyperlipidemia    2. Anxiety    3. Depression, unspecified depression type        Plan:         Isela was seen today for discuss medications.    Diagnoses and all orders for this visit:    Mixed hyperlipidemia: due for routine labs.  -     CBC auto differential; Future  -     Comprehensive metabolic panel; Future  -     Lipid panel; Future    Anxiety: will try 150mg of wellbutrin BID. If not helping will call and we will send in the prozac 20mg.     Depression, unspecified depression type    Continue current medication  Take medications only as prescribed  Healthy diet, exercise  Adequate rest  Adequate hydration  Avoid allergens  Avoid excessive caffeine  "

## 2018-02-05 ENCOUNTER — TELEPHONE (OUTPATIENT)
Dept: FAMILY MEDICINE | Facility: CLINIC | Age: 68
End: 2018-02-05

## 2018-02-05 NOTE — TELEPHONE ENCOUNTER
Please let her know that I spoke with the doctor ( my collaborative provider  ) here about the xanax.  We will not be able to give her 60 tablets monthly like she has been getting. We can wean her down slowly to help her get off the medication but if she feels she will need to continue with 60 tablets a month then she will need to seek another provider or we can refer her to Psychiatry.  Unfortunately we do not manage long term use of controlled medications at our clinic.  Please apologize for me.  This is beyond my control.     She has one refill left on her current script from Dr. Vela.

## 2018-02-05 NOTE — TELEPHONE ENCOUNTER
Spoke to pt and she stated she needs the Xanax twice a night, said she already was taking more and weaned down to this dose.  Explained to pt this clinic dont refill chronic controlled rx. And her options for psych referral or to find a provider that will cont to refill her meds.   Pt was thankful for the information and will think about it.   Informed pt that if she went the psych route she probably would like to start finding one that takes new pt's and her ins due to appt's are usually far out, then she could call back with who to send the referral to.   She said she will think about it.

## 2018-03-15 ENCOUNTER — TELEPHONE (OUTPATIENT)
Dept: FAMILY MEDICINE | Facility: CLINIC | Age: 68
End: 2018-03-15

## 2018-03-15 ENCOUNTER — OFFICE VISIT (OUTPATIENT)
Dept: FAMILY MEDICINE | Facility: CLINIC | Age: 68
End: 2018-03-15
Payer: MEDICARE

## 2018-03-15 VITALS
SYSTOLIC BLOOD PRESSURE: 128 MMHG | HEART RATE: 60 BPM | BODY MASS INDEX: 23.55 KG/M2 | WEIGHT: 128 LBS | DIASTOLIC BLOOD PRESSURE: 66 MMHG | RESPIRATION RATE: 12 BRPM | HEIGHT: 62 IN | TEMPERATURE: 99 F

## 2018-03-15 DIAGNOSIS — G57.62 MORTON NEUROMA, LEFT: ICD-10-CM

## 2018-03-15 DIAGNOSIS — F13.20 BENZODIAZEPINE DEPENDENCE: ICD-10-CM

## 2018-03-15 DIAGNOSIS — G47.00 INSOMNIA, UNSPECIFIED TYPE: ICD-10-CM

## 2018-03-15 DIAGNOSIS — F41.9 ANXIETY: Primary | ICD-10-CM

## 2018-03-15 PROCEDURE — 99214 OFFICE O/P EST MOD 30 MIN: CPT | Mod: S$GLB,,, | Performed by: NURSE PRACTITIONER

## 2018-03-15 RX ORDER — TRAZODONE HYDROCHLORIDE 150 MG/1
150 TABLET ORAL NIGHTLY
Qty: 30 TABLET | Refills: 6 | Status: SHIPPED | OUTPATIENT
Start: 2018-03-15 | End: 2019-01-08 | Stop reason: SDUPTHER

## 2018-03-15 RX ORDER — TRAZODONE HYDROCHLORIDE 150 MG/1
150 TABLET ORAL NIGHTLY
Qty: 30 TABLET | Refills: 6 | Status: SHIPPED | OUTPATIENT
Start: 2018-03-15 | End: 2018-03-15 | Stop reason: SDUPTHER

## 2018-03-15 RX ORDER — ALPRAZOLAM 0.25 MG/1
0.25 TABLET ORAL NIGHTLY PRN
Qty: 30 TABLET | Refills: 0 | Status: SHIPPED | OUTPATIENT
Start: 2018-04-03 | End: 2019-01-08 | Stop reason: SDUPTHER

## 2018-03-15 RX ORDER — BUPROPION HCL 300 MG
300 TABLET, EXTENDED RELEASE 24 HR ORAL DAILY
Qty: 90 TABLET | Refills: 3 | Status: SHIPPED | OUTPATIENT
Start: 2018-03-15 | End: 2019-01-08 | Stop reason: SDUPTHER

## 2018-03-15 NOTE — TELEPHONE ENCOUNTER
Spoke with pt and she verified her other med refills were for more than one month.   Pt requested phone number to psych for future reference if decided to go that route to cont with med refills.

## 2018-03-15 NOTE — PROGRESS NOTES
Subjective:       Patient ID: Isela Giraldo is a 67 y.o. female.    Chief Complaint: Medication Refill    HPI here for refill on her wellbutrin. States she is doing well with her  Current dose. She is going through a divorce right now. She states that she has weaned down on her xanax to one at bedtime. She recently filled the last script that was given to her by Dr. Vela. He has been giving her #60 monthly for many years. She was advised after her last visit that I would not be able to give this to her on a monthly basis. Spoke with  who does not want to resume responsibility for her monthly Benzodiazepines. She is requesting a post dated script today to fill when she runs out of her current script. She is having issues with sleeping. States she has taken Trazodone in the past and would like to try this again. she has a history of Mortons Neuroma and would like referral to see Podiatry. She denies any other concerns today. See ROS.    The following portion of the patients history was reviewed and updated as appropriate: allergies, current medications, past medical and surgical history. Past social history and problem list reviewed. Family PMH and Past social history reviewed. Tobacco, Illicit drug use reviewed.     Review of Systems  Constitutional: No fatigue or fever    Respiratory:   No SOB, Wheezing, cough  Cardiovascular:   No CP, Palpitations  Gastrointestinal:   No N/V/D. No abdominal pain, weight stable. Appetite good.   Genitourinary:   No dysuria, urgency or frequency. No change in bowels. No blood in stools.   Musculoskeletal:   No joint pain  No change in gait or coordination. .  Neurological:   No dizziness. No headaches  Hematological: No abnormal bruising or bleeding    Psychiatric/Behavioral Negative for suicidal ideas.  Denies feelings of depression. No thoughts of wanting to harm self or others. trouble sleeping.     Objective:     /66   Pulse 60   Temp 98.8 °F (37.1 °C)  "(Oral)   Resp 12   Ht 5' 2" (1.575 m)   Wt 58.1 kg (128 lb) Comment: per the patient  BMI 23.41 kg/m²      Physical Exam     Constitutional: oriented to person, place, and time. well-developed and well-nourished.   Cardiovascular: Normal rate, regular rhythm and normal heart sounds.    Pulmonary/Chest: Effort normal and breath sounds normal. No respiratory distress. No wheezes.   Musculoskeletal: Normal range of motion. gait and coordination normal  Neurological: oriented to person, place, and time.   Skin: Skin is warm and dry. No rashes or lesions  Psychiatric: Normal mood and affect.Behavior is normal. Judgment and thought content normal.   Assessment:       1. Anxiety    2. Insomnia, unspecified type    3. Romero neuroma, left    4. Benzodiazepine dependence        Plan:         Isela was seen today for medication refill.    Diagnoses and all orders for this visit:    Anxiety: continue the wellbutrin. Needs to continue to wean down on the Xanax until she can get off of it safely. Will give post dated script for only 30 tablets.   -     WELLBUTRIN  mg 24 hr tablet; Take 1 tablet (300 mg total) by mouth once daily. Patient requires Brand Name Wellbutrin since she does not respond well to the generic drug.  -     ALPRAZolam (XANAX) 0.25 MG tablet; Take 1 tablet (0.25 mg total) by mouth nightly as needed for Anxiety.    Insomnia, unspecified type: refill trazodone  -     traZODone (DESYREL) 150 MG tablet; Take 1 tablet (150 mg total) by mouth every evening.    Romero neuroma, left: will place referral to Podiatry.   -     Ambulatory consult to Podiatry    Benzodiazepine Dependence: advised to continue slowly weaning off the Xanax    Continue current medication  Take medications only as prescribed  Healthy diet, exercise  Adequate rest  Adequate hydration  Avoid allergens  Avoid excessive caffeine    ADDENDUM: please see phone message dated this same day. Patient called back upset that I did not give her " multiple refills on her xanax. She told nurse that she had not weaned down on the xanax and did not plan to do this.  She is advised that she will need to see Psychiatry for this issue or seek care with another provider as our clinic does not follow chronic controlled medication use. She is not happy about this.

## 2018-03-15 NOTE — TELEPHONE ENCOUNTER
I did  Not give her refills because Dr. Spann said we were not going to be giving her this every month. Said she needed to work on weaning down and off the medication or see another provider who could give this to her like Psych.  I can only do What Dr. Spann, my Collaborative provider will allow. She can make appointment to see Dr. Spann and discuss this with her if she would like.

## 2018-03-15 NOTE — TELEPHONE ENCOUNTER
----- Message from Graciela Garibayza sent at 3/15/2018 11:54 AM CDT -----  Contact: self [phone]  She is dropping off the prescription for xanax at the pharmacy, she realized that you did not give her refills.  Please call her about this.  Thank you!

## 2018-03-15 NOTE — TELEPHONE ENCOUNTER
----- Message from Alida Meadows sent at 3/15/2018  3:12 PM CDT -----  Contact: self   Placed call to pod, patient want to speak with a nurse regarding earlier conversation please call back at [phone] (home)

## 2018-03-15 NOTE — TELEPHONE ENCOUNTER
I am so sorry that she is upset. She told me today that she had already weaned herself down to one at bedtime instead of two, then told you she had not. I can only do what I, as an NP, am allowed to do. Again, she is welcome to come see Dr. Spann personally and speak  With her about this to see if she will change her mind.  It was most tragic that Dr. Vela passed away. I truly wish he was still her to be able to continue caring for her. I know she is going through a stressful time, but my hands are tied. She was informed of Dr. Spann's reply about her Xanax after her last visit. See documentation.

## 2018-03-15 NOTE — TELEPHONE ENCOUNTER
Spoke to pt and informed her of med recommendation.   She was very upset, said Dr Vela always gave her refills for 3 months, that he told her he was going to take care of her that she did not need Psych. She has not increased or decreased this med since she started on it and that it works for her.  She said she is going thru a divorce and not going to ween at this time.  She feels like she waisted her time today.  She said she Will have to find a doctor who will fill.

## 2018-03-27 ENCOUNTER — OFFICE VISIT (OUTPATIENT)
Dept: PODIATRY | Facility: CLINIC | Age: 68
End: 2018-03-27
Payer: MEDICARE

## 2018-03-27 VITALS — WEIGHT: 128.06 LBS | HEIGHT: 62 IN | BODY MASS INDEX: 23.57 KG/M2

## 2018-03-27 DIAGNOSIS — Q66.70 PES CAVUS: ICD-10-CM

## 2018-03-27 DIAGNOSIS — M24.573 EQUINUS CONTRACTURE OF ANKLE: ICD-10-CM

## 2018-03-27 DIAGNOSIS — R20.0 NUMBNESS OF FOOT: ICD-10-CM

## 2018-03-27 DIAGNOSIS — R20.2 PARESTHESIA OF FOOT, BILATERAL: Primary | ICD-10-CM

## 2018-03-27 PROCEDURE — 99999 PR PBB SHADOW E&M-EST. PATIENT-LVL III: CPT | Mod: PBBFAC,,, | Performed by: PODIATRIST

## 2018-03-27 PROCEDURE — 99203 OFFICE O/P NEW LOW 30 MIN: CPT | Mod: S$PBB,,, | Performed by: PODIATRIST

## 2018-03-27 PROCEDURE — 99213 OFFICE O/P EST LOW 20 MIN: CPT | Mod: PBBFAC,PN | Performed by: PODIATRIST

## 2018-03-27 NOTE — PROGRESS NOTES
Subjective:      Patient ID: Isela Giraldo is a 67 y.o. female.    Chief Complaint: Foot Pain (left greater than right)  Patient presents to clinic with the chief complaint of bilateral forefoot pain, with the Lt. > Rt., for over two years.  States the majority of pain is localized between the 2nd and 3rd toes bilateral.  Describes pain as a combination of sharp, tingling/burning, and numbness.  Also, relates noticing numbness in her lower legs.  Relates having a prior NCV/EMG test, however, states this was performed for only the Rt. Lower extremity over two years ago.  States that symptoms tend to be positional, however, has noted them nocturnally as well.  Has not attempted to self treat, as she was unsure of the origin of symptoms.  Inquires as to potential treatment options.  Denies recent injury to bilateral foot.  Denies any additional pedal complaints.     Past Medical History:   Diagnosis Date    Anxiety     Depression     Osteopenia        Past Surgical History:   Procedure Laterality Date    rectal fissure surgery         Family History   Problem Relation Age of Onset    Cancer Mother      ccervical    Cataracts Mother     Breast cancer Neg Hx     Amblyopia Neg Hx     Blindness Neg Hx     Diabetes Neg Hx     Glaucoma Neg Hx     Hypertension Neg Hx     Macular degeneration Neg Hx     Retinal detachment Neg Hx     Strabismus Neg Hx     Stroke Neg Hx     Thyroid disease Neg Hx        Social History     Social History    Marital status:      Spouse name: N/A    Number of children: 6    Years of education: N/A     Social History Main Topics    Smoking status: Never Smoker    Smokeless tobacco: Never Used    Alcohol use No    Drug use: No    Sexual activity: No      Comment: rare     Other Topics Concern    None     Social History Narrative    None       Current Outpatient Prescriptions   Medication Sig Dispense Refill    albuterol 90 mcg/actuation inhaler Inhale 1  puff into the lungs every 4 (four) hours as needed. 8.5 g 1    [START ON 4/3/2018] ALPRAZolam (XANAX) 0.25 MG tablet Take 1 tablet (0.25 mg total) by mouth nightly as needed for Anxiety. 30 tablet 0    mometasone (NASONEX) 50 mcg/actuation nasal spray 2 sprays by Nasal route once daily.      mupirocin (BACTROBAN) 2 % ointment by Nasal route 2 (two) times daily. (Patient taking differently: by Nasal route 2 (two) times daily. As needed) 30 g 0    traZODone (DESYREL) 150 MG tablet Take 1 tablet (150 mg total) by mouth every evening. 30 tablet 6    WELLBUTRIN  mg 24 hr tablet Take 1 tablet (300 mg total) by mouth once daily. Patient requires Brand Name Wellbutrin since she does not respond well to the generic drug. 90 tablet 3     No current facility-administered medications for this visit.        Review of patient's allergies indicates:   Allergen Reactions    Fosamax [alendronate]      Did not feel right    Ramelteon Other (See Comments)     Melatonen    Shrimp Hives     Hives and itching head to toe       Review of Systems   Constitution: Negative for chills and fever.   Cardiovascular: Negative for claudication and leg swelling.   Skin: Negative for color change and nail changes.   Musculoskeletal: Negative for joint pain and joint swelling.   Neurological: Positive for numbness and paresthesias.   Psychiatric/Behavioral: Negative for altered mental status.           Objective:      Physical Exam   Constitutional: She is oriented to person, place, and time. She appears well-developed and well-nourished. No distress.   Cardiovascular:   Pulses:       Dorsalis pedis pulses are 2+ on the right side, and 2+ on the left side.        Posterior tibial pulses are 2+ on the right side, and 2+ on the left side.   CFT is < 3 seconds bilateral.  Pedal hair growth is present bilateral.  Mild varicosities noted bilateral.  No lower extremity edema noted bilateral.  Toes are warm to touch bilateral.      Musculoskeletal: She exhibits tenderness. She exhibits no edema.   Muscle strength 5/5 in all muscle groups bilateral.  No tenderness nor crepitation with ROM of foot/ankle joints bilateral.  Bilateral pes cavus foot type.  Bilateral hallux abducto valgus.  Bilateral semi-reducible contracture of toes 2-5.  Bilateral Tailor's bunion.  Pain with palpation of bilateral 2nd intermetatarsal space. (-) lilia sign bilateral.  Pain with both medial and lateral compression of bilateral forefoot.  Bilateral gastrocnemius equinus.     Neurological: She is alert and oriented to person, place, and time. She has normal strength. No sensory deficit.   Protective sensation per Beaver-Anton monofilament is intact bilateral.  Vibratory sensation is decreased bilateral.  Light touch is intact bilateral.  (-) Tinel sign bilateral.     Skin: Skin is warm, dry and intact. Capillary refill takes less than 2 seconds. No abrasion, no bruising, no burn, no ecchymosis, no laceration, no lesion, no petechiae and no rash noted. She is not diaphoretic. No erythema. No pallor.   Pedal skin has normal turgor, temperature, and texture bilateral.  Toenails x 10 appear normotrophic. Examination of the skin reveals no evidence of significant maceration, rashes, open lesions, suspicious appearing nevi or other concerning lesions.              Assessment:       Encounter Diagnoses   Name Primary?    Paresthesia of foot, bilateral Yes    Numbness of foot     Equinus contracture of ankle     Pes cavus          Plan:       Isela was seen today for foot pain.    Diagnoses and all orders for this visit:    Paresthesia of foot, bilateral  -     EMG W/ ULTRASOUND AND NERVE CONDUCTION TEST 2 Extremities; Future    Numbness of foot  -     EMG W/ ULTRASOUND AND NERVE CONDUCTION TEST 2 Extremities; Future    Equinus contracture of ankle    Pes cavus      I counseled the patient on her conditions, their implications and medical management.    Had a  lengthy discussion regarding her current set of symptoms.  On exam, she presents as having bilateral neuromas of the 2nd webspace, however, she is also reporting numbness and paresthesias of bilateral lower extremity.  Explained that it would be prudent to run further tests on her nerves, as a simple excision of possible neuromas may not fully alleviate current symptoms.  Patient is quite understanding and amenable to this plan of action.    In the meantime, she was advised to begin performing stretching exercises to help reduce bilateral equinus.  This will help to reduce pressure to bilateral forefoot that is further exacerbating nerve pain.      Also, I advised to obtain a more supportive pair of athletic shoes, as poor support can contribute to symptoms as well.    Orders written for a NCV/EMG test of bilateral LE.    RTC pending NCV/EMG test results.    Daniel Dempsey DPM

## 2018-03-27 NOTE — LETTER
March 27, 2018      Alivia Cárdenas, SONIA  [address] [address] [address]           [facility]  [address] [facility] [address] [place] [address]  Phone: [phone]          Patient: Isela Giraldo   MR Number: [id number]   YOB: 1950   Date of Visit: 3/27/2018       Dear Alivia Cárdenas:    Thank you for referring Isela Giraldo to me for evaluation. Attached you will find relevant portions of my assessment and plan of care.    If you have questions, please do not hesitate to call me. I look forward to following Isela Giraldo along with you.    Sincerely,    Daniel Dempsey DPM    Enclosure  CC:  No Recipients    If you would like to receive this communication electronically, please contact [email or web address] or [phone] to request more information on [employer] access.    For providers and/or their staff who would like to refer a patient to [facility], please contact us through our one-stop-shop provider referral line, [facility], at [phone].    If you feel you have received this communication in error or would no longer like to receive these types of communications, please e-mail [email or web address]

## 2018-08-13 ENCOUNTER — OFFICE VISIT (OUTPATIENT)
Dept: URGENT CARE | Facility: CLINIC | Age: 68
End: 2018-08-13
Payer: MEDICARE

## 2018-08-13 VITALS
TEMPERATURE: 100 F | WEIGHT: 125 LBS | HEIGHT: 62 IN | HEART RATE: 90 BPM | OXYGEN SATURATION: 97 % | SYSTOLIC BLOOD PRESSURE: 164 MMHG | RESPIRATION RATE: 16 BRPM | BODY MASS INDEX: 23 KG/M2 | DIASTOLIC BLOOD PRESSURE: 89 MMHG

## 2018-08-13 DIAGNOSIS — J32.9 BACTERIAL SINUSITIS: Primary | ICD-10-CM

## 2018-08-13 DIAGNOSIS — B96.89 BACTERIAL SINUSITIS: Primary | ICD-10-CM

## 2018-08-13 PROCEDURE — 99213 OFFICE O/P EST LOW 20 MIN: CPT | Mod: S$GLB,,, | Performed by: PHYSICIAN ASSISTANT

## 2018-08-13 RX ORDER — FLUTICASONE PROPIONATE 50 MCG
1 SPRAY, SUSPENSION (ML) NASAL DAILY PRN
COMMUNITY
End: 2022-11-07

## 2018-08-13 RX ORDER — AMOXICILLIN AND CLAVULANATE POTASSIUM 875; 125 MG/1; MG/1
1 TABLET, FILM COATED ORAL 2 TIMES DAILY
Qty: 20 TABLET | Refills: 0 | Status: SHIPPED | OUTPATIENT
Start: 2018-08-13 | End: 2018-08-23

## 2018-08-13 NOTE — PATIENT INSTRUCTIONS
Sinusitis (Antibiotic Treatment)    The sinuses are air-filled spaces within the bones of the face. They connect to the inside of the nose. Sinusitis is an inflammation of the tissue lining the sinus cavity. Sinus inflammation can occur during a cold. It can also be due to allergies to pollens and other particles in the air. Sinusitis can cause symptoms of sinus congestion and fullness. A sinus infection causes fever, headache and facial pain. There is often green or yellow drainage from the nose or into the back of the throat (post-nasal drip). You have been given antibiotics to treat this condition.  Home care:  · Take the full course of antibiotics as instructed. Do not stop taking them, even if you feel better.  · Drink plenty of water, hot tea, and other liquids. This may help thin mucus. It also may promote sinus drainage.  · Heat may help soothe painful areas of the face. Use a towel soaked in hot water. Or,  the shower and direct the hot spray onto your face. Using a vaporizer along with a menthol rub at night may also help.   · An expectorant containing guaifenesin may help thin the mucus and promote drainage from the sinuses.  · Over-the-counter decongestants may be used unless a similar medicine was prescribed. Nasal sprays work the fastest. Use one that contains phenylephrine or oxymetazoline. First blow the nose gently. Then use the spray. Do not use these medicines more often than directed on the label or symptoms may get worse. You may also use tablets containing pseudoephedrine. Avoid products that combine ingredients, because side effects may be increased. Read labels. You can also ask the pharmacist for help. (NOTE: Persons with high blood pressure should not use decongestants. They can raise blood pressure.)  · Over-the-counter antihistamines may help if allergies contributed to your sinusitis.    · Do not use nasal rinses or irrigation during an acute sinus infection, unless told to by  your health care provider. Rinsing may spread the infection to other sinuses.  · Use acetaminophen or ibuprofen to control pain, unless another pain medicine was prescribed. (If you have chronic liver or kidney disease or ever had a stomach ulcer, talk with your doctor before using these medicines. Aspirin should never be used in anyone under 18 years of age who is ill with a fever. It may cause severe liver damage.)  · Don't smoke. This can worsen symptoms.  Follow-up care  Follow up with your healthcare provider or our staff if you are not improving within the next week.  When to seek medical advice  Call your healthcare provider if any of these occur:  · Facial pain or headache becoming more severe  · Stiff neck  · Unusual drowsiness or confusion  · Swelling of the forehead or eyelids  · Vision problems, including blurred or double vision  · Fever of 100.4ºF (38ºC) or higher, or as directed by your healthcare provider  · Seizure  · Breathing problems  · Symptoms not resolving within 10 days  Date Last Reviewed: 4/13/2015  © 7125-1439 [employer]. [address] [place] [address], [place] [address]. All rights reserved. This information is not intended as a substitute for professional medical care. Always follow your healthcare professional's instructions.

## 2018-08-13 NOTE — PROGRESS NOTES
"Subjective:       Patient ID: Isela Giraldo is a 67 y.o. female.    Vitals:  height is 5' 2" (1.575 m) and weight is 56.7 kg (125 lb). Her oral temperature is 100.2 °F (37.9 °C). Her blood pressure is 164/89 (abnormal) and her pulse is 90. Her respiration is 16 and oxygen saturation is 97%.     Chief Complaint: Sinus Problem    Patient has been having problems with her allergies for several weeks. Saturday started with severe facial pain and HA. This is similar to sinus infections in the past.      Sinus Problem   This is a new problem. The current episode started in the past 7 days. The problem is unchanged. The maximum temperature recorded prior to her arrival was 100.4 - 100.9 F. Her pain is at a severity of 0/10. She is experiencing no pain. Associated symptoms include headaches and sinus pressure. Pertinent negatives include no chills, congestion, coughing, ear pain, hoarse voice, shortness of breath or sore throat. Past treatments include nothing.     Review of Systems   Constitution: Negative for chills, fever and malaise/fatigue.   HENT: Positive for sinus pressure. Negative for congestion, ear pain, hoarse voice and sore throat.    Eyes: Negative for discharge and redness.   Cardiovascular: Negative for chest pain, dyspnea on exertion and leg swelling.   Respiratory: Negative for cough, shortness of breath, sputum production and wheezing.    Musculoskeletal: Negative for myalgias.   Gastrointestinal: Negative for abdominal pain and nausea.   Neurological: Positive for headaches.       Objective:      Physical Exam   Constitutional: She is oriented to person, place, and time. She appears well-developed and well-nourished. She is cooperative.  Non-toxic appearance. She does not appear ill. No distress.   HENT:   Head: Normocephalic and atraumatic.   Right Ear: Hearing, tympanic membrane, external ear and ear canal normal.   Left Ear: Hearing, tympanic membrane, external ear and ear canal normal. "   Nose: Rhinorrhea present. No mucosal edema or nasal deformity. No epistaxis. Right sinus exhibits maxillary sinus tenderness. Right sinus exhibits no frontal sinus tenderness. Left sinus exhibits maxillary sinus tenderness. Left sinus exhibits no frontal sinus tenderness.   Mouth/Throat: Uvula is midline, oropharynx is clear and moist and mucous membranes are normal. No trismus in the jaw. Normal dentition. No uvula swelling. No posterior oropharyngeal erythema.   Eyes: Conjunctivae and lids are normal. No scleral icterus.   Sclera clear bilat   Neck: Trachea normal, full passive range of motion without pain and phonation normal. Neck supple.   Cardiovascular: Normal rate, regular rhythm, normal heart sounds, intact distal pulses and normal pulses.   Pulmonary/Chest: Effort normal and breath sounds normal. No respiratory distress.   Abdominal: Soft. Normal appearance and bowel sounds are normal. She exhibits no distension. There is no tenderness.   Musculoskeletal: Normal range of motion. She exhibits no edema or deformity.   Neurological: She is alert and oriented to person, place, and time. She exhibits normal muscle tone. Coordination normal.   Skin: Skin is warm, dry and intact. She is not diaphoretic. No pallor.   Psychiatric: She has a normal mood and affect. Her speech is normal and behavior is normal. Judgment and thought content normal. Cognition and memory are normal.   Nursing note and vitals reviewed.      Assessment:       1. Bacterial sinusitis        Plan:         Bacterial sinusitis    Other orders  -     amoxicillin-clavulanate 875-125mg (AUGMENTIN) 875-125 mg per tablet; Take 1 tablet by mouth 2 (two) times daily. for 10 days  Dispense: 20 tablet; Refill: 0     I discussed with the patient the importance of follow up if their symptoms have not resolved in 1-2 week's time. Patient verbalized understanding and will RTC or go to the nearest ER if symptoms persist or worsen.

## 2018-08-16 ENCOUNTER — TELEPHONE (OUTPATIENT)
Dept: URGENT CARE | Facility: CLINIC | Age: 68
End: 2018-08-16

## 2018-08-17 ENCOUNTER — OFFICE VISIT (OUTPATIENT)
Dept: FAMILY MEDICINE | Facility: CLINIC | Age: 68
End: 2018-08-17
Payer: MEDICARE

## 2018-08-17 ENCOUNTER — TELEPHONE (OUTPATIENT)
Dept: FAMILY MEDICINE | Facility: CLINIC | Age: 68
End: 2018-08-17

## 2018-08-17 VITALS
OXYGEN SATURATION: 96 % | TEMPERATURE: 99 F | RESPIRATION RATE: 20 BRPM | DIASTOLIC BLOOD PRESSURE: 72 MMHG | BODY MASS INDEX: 23 KG/M2 | WEIGHT: 125 LBS | SYSTOLIC BLOOD PRESSURE: 132 MMHG | HEART RATE: 91 BPM | HEIGHT: 62 IN

## 2018-08-17 DIAGNOSIS — J30.9 ALLERGIC RHINITIS, UNSPECIFIED SEASONALITY, UNSPECIFIED TRIGGER: ICD-10-CM

## 2018-08-17 DIAGNOSIS — R09.89 CHRONIC SINUS COMPLAINTS: Primary | ICD-10-CM

## 2018-08-17 PROCEDURE — 99213 OFFICE O/P EST LOW 20 MIN: CPT | Mod: S$PBB,,, | Performed by: NURSE PRACTITIONER

## 2018-08-17 PROCEDURE — 99999 PR PBB SHADOW E&M-EST. PATIENT-LVL IV: CPT | Mod: PBBFAC,,, | Performed by: NURSE PRACTITIONER

## 2018-08-17 PROCEDURE — 99214 OFFICE O/P EST MOD 30 MIN: CPT | Mod: PBBFAC,PO | Performed by: NURSE PRACTITIONER

## 2018-08-17 RX ORDER — MONTELUKAST SODIUM 10 MG/1
10 TABLET ORAL NIGHTLY
Qty: 30 TABLET | Refills: 3 | Status: SHIPPED | OUTPATIENT
Start: 2018-08-17 | End: 2018-10-31

## 2018-08-17 NOTE — PROGRESS NOTES
Subjective:       Patient ID: Isela Giraldo is a 67 y.o. female.    Chief Complaint: Fever (low grade fever, feels spacey, on abx for 5 days (augumenten))  She was last seen in primary care by SONIA Sheridan on 03/15/2018. This is her first time seeing me in the clinic.   HPI   States she was on Augmentin for 5 days and was still running low grade fever and not feeling well. States the highest her temp was 101 2 nights ago. States having symptoms of allergens and has been taking chlortrimeton and mucinex. She stopped her antibiotic before it was completed.   Vitals:    08/17/18 1514   BP: 132/72   Pulse: 91   Resp: 20   Temp: 98.5 °F (36.9 °C)     BP Readings from Last 3 Encounters:   08/17/18 132/72   08/13/18 (!) 164/89   03/15/18 128/66     Review of Systems   HENT: Positive for postnasal drip, rhinorrhea and sinus pressure.        Objective:      Physical Exam   Constitutional: She is oriented to person, place, and time. Vital signs are normal. She appears well-developed and well-nourished. She is active and cooperative.   HENT:   Head: Normocephalic and atraumatic.   Right Ear: Hearing, tympanic membrane, external ear and ear canal normal.   Left Ear: Hearing, tympanic membrane, external ear and ear canal normal.   Nose: Nose normal.   Mouth/Throat: Uvula is midline, oropharynx is clear and moist and mucous membranes are normal.   Eyes: Lids are normal.   Neck: Trachea normal, normal range of motion, full passive range of motion without pain and phonation normal. Neck supple.   Cardiovascular: Normal rate, regular rhythm, normal heart sounds and intact distal pulses.   Pulmonary/Chest: Effort normal and breath sounds normal.   Abdominal: Soft. Bowel sounds are normal.   Musculoskeletal: Normal range of motion.   Lymphadenopathy:        Head (right side): No submental, no submandibular, no tonsillar, no preauricular, no posterior auricular and no occipital adenopathy present.        Head (left side): No  submental, no submandibular, no tonsillar, no preauricular, no posterior auricular and no occipital adenopathy present.     She has no cervical adenopathy.   Neurological: She is alert and oriented to person, place, and time.   Skin: Skin is warm, dry and intact.   Psychiatric: She has a normal mood and affect. Her speech is normal and behavior is normal. Judgment and thought content normal. Cognition and memory are normal.   Nursing note and vitals reviewed.      Assessment & Plan:       Chronic sinus complaints  -     montelukast (SINGULAIR) 10 mg tablet; Take 1 tablet (10 mg total) by mouth every evening.  Dispense: 30 tablet; Refill: 3    Allergic rhinitis, unspecified seasonality, unspecified trigger  -     montelukast (SINGULAIR) 10 mg tablet; Take 1 tablet (10 mg total) by mouth every evening.  Dispense: 30 tablet; Refill: 3    Saline nasal spray in shower  Please complete your antibiotics        Follow-up if symptoms worsen or fail to improve.

## 2018-10-31 ENCOUNTER — OFFICE VISIT (OUTPATIENT)
Dept: FAMILY MEDICINE | Facility: CLINIC | Age: 68
End: 2018-10-31
Payer: MEDICARE

## 2018-10-31 VITALS
TEMPERATURE: 99 F | BODY MASS INDEX: 23.13 KG/M2 | SYSTOLIC BLOOD PRESSURE: 112 MMHG | WEIGHT: 125.69 LBS | DIASTOLIC BLOOD PRESSURE: 62 MMHG | HEIGHT: 62 IN | HEART RATE: 78 BPM | OXYGEN SATURATION: 97 %

## 2018-10-31 DIAGNOSIS — J00 ACUTE RHINITIS: Primary | ICD-10-CM

## 2018-10-31 PROCEDURE — 99213 OFFICE O/P EST LOW 20 MIN: CPT | Mod: PBBFAC,PO | Performed by: NURSE PRACTITIONER

## 2018-10-31 PROCEDURE — 99213 OFFICE O/P EST LOW 20 MIN: CPT | Mod: S$PBB,,, | Performed by: NURSE PRACTITIONER

## 2018-10-31 PROCEDURE — 99999 PR PBB SHADOW E&M-EST. PATIENT-LVL III: CPT | Mod: PBBFAC,,, | Performed by: NURSE PRACTITIONER

## 2018-10-31 NOTE — PROGRESS NOTES
Subjective:       Patient ID: Isela Giraldo is a 68 y.o. female.    Chief Complaint: Dizziness    Ms. Giraldo is a new patient to me. She presents today for dizziness    HPI   Symptoms have improved since she made appointment. Chronic allergy sufferer. +sinus pressure. Uses flonase and neti pot. Doesn't tolerate antihistamines or Singulair (dries her out)  Vitals:    10/31/18 1336   BP: 112/62   Pulse: 78   Temp: 98.8 °F (37.1 °C)     Review of Systems   Constitutional: Negative for diaphoresis and fever.   HENT: Positive for sinus pressure. Negative for facial swelling and trouble swallowing.    Eyes: Negative for discharge and redness.   Respiratory: Negative for cough and shortness of breath.    Cardiovascular: Negative for chest pain and palpitations.   Musculoskeletal: Negative for gait problem.   Skin: Negative for rash and wound.   Neurological: Positive for dizziness. Negative for facial asymmetry and speech difficulty.   Psychiatric/Behavioral: Negative for confusion. The patient is not nervous/anxious.        Past Medical History:   Diagnosis Date    Anxiety     Depression     Osteopenia      Objective:      Physical Exam   Constitutional: She is oriented to person, place, and time. She does not have a sickly appearance. No distress.   HENT:   Head: Normocephalic.   Right Ear: Hearing, external ear and ear canal normal. Tympanic membrane is bulging.   Left Ear: External ear and ear canal normal. Tympanic membrane is bulging.   Nose: Nose normal.   Eyes: Conjunctivae and lids are normal.   Neck: No JVD present. No tracheal deviation present.   Cardiovascular: Normal rate.   Pulmonary/Chest: Effort normal and breath sounds normal.   Musculoskeletal: She exhibits no deformity.   Neurological: She is alert and oriented to person, place, and time.   Skin: She is not diaphoretic. No pallor.   Psychiatric: She has a normal mood and affect. Her speech is normal and behavior is normal. Judgment and  thought content normal. Cognition and memory are normal.   Nursing note and vitals reviewed.      Assessment:       1. Acute rhinitis        Plan:       Acute rhinitis    symptoms improving; continue neti pot, flonase, add benadry at night, decongestant prn         Follow-up for further evaluation if s/s worsen, fail to improve, or new symptoms arise.       Medication List           Accurate as of 10/31/18  1:56 PM. If you have any questions, ask your nurse or doctor.               CONTINUE taking these medications    albuterol 90 mcg/actuation inhaler  Commonly known as:  PROVENTIL/VENTOLIN HFA  Inhale 1 puff into the lungs every 4 (four) hours as needed.     ALPRAZolam 0.25 MG tablet  Commonly known as:  XANAX  Take 1 tablet (0.25 mg total) by mouth nightly as needed for Anxiety.     fluticasone 50 mcg/actuation nasal spray  Commonly known as:  FLONASE     traZODone 150 MG tablet  Commonly known as:  DESYREL  Take 1 tablet (150 mg total) by mouth every evening.     WELLBUTRIN  MG 24 hr tablet  Generic drug:  buPROPion  Take 1 tablet (300 mg total) by mouth once daily. Patient requires Brand Name Wellbutrin since she does not respond well to the generic drug.        STOP taking these medications    montelukast 10 mg tablet  Commonly known as:  SINGULAIR  Stopped by:  Zoya Avila NP

## 2018-11-02 ENCOUNTER — TELEPHONE (OUTPATIENT)
Dept: FAMILY MEDICINE | Facility: CLINIC | Age: 68
End: 2018-11-02

## 2018-11-02 DIAGNOSIS — B97.89 VIRAL SINUSITIS: Primary | ICD-10-CM

## 2018-11-02 DIAGNOSIS — J32.9 VIRAL SINUSITIS: Primary | ICD-10-CM

## 2018-11-02 RX ORDER — METHYLPREDNISOLONE 4 MG/1
TABLET ORAL
Qty: 21 TABLET | Refills: 0 | Status: SHIPPED | OUTPATIENT
Start: 2018-11-03 | End: 2019-01-08 | Stop reason: ALTCHOICE

## 2018-11-02 NOTE — TELEPHONE ENCOUNTER
----- Message from Estela Licona sent at 11/2/2018  8:13 AM CDT -----    Pt  Is calling  To  Speak to  The nurse about   Running  Fever  And   Still   not  Feeling  Well   Pt  Is  Running  Fever  , pt  Was  offed an nurys  , and  Stated she  Wants to  Speak   To the nurse  // pt  Requesting  augmentin   850 mg    For  10  Days // please call  For   details  [place] [id number] - [place] - [address]  [address]   [place] [address]  Phone: [phone] Fax: [phone]

## 2018-11-02 NOTE — TELEPHONE ENCOUNTER
Type: Flu Symptoms/URI/Cold/Sinus    How long? :  2 weeks  Fever? yes  Congestion?no  Cough? no  Mucous? no   If yes, what color? N/A  Shortness of Breath / Wheezing? no   Any medications taken over the counter? Yes  If so, what medications? Sudafed, benadryl, advil, antihistamine  Has anyone around been sick?   Have you seen recently for this issue? yes  Are you allergic to anything? yes  Preferred pharmacy? [facility]   Additional information:  States congestion will not relieve itself. Pain is still present and is requesting augmentin 850mg that has worked for her in the past.

## 2018-11-02 NOTE — TELEPHONE ENCOUNTER
Informed patient of steroid dosepack being sent to pharmacy. Patient verbalized confusion as to why Augmentin was not prescribed. Explained to patient that if she wasn't producing any mucus/sputum that the infection was most likely viral and not bacterial per SONIA Kenny. Verbalized understanding.

## 2018-11-02 NOTE — TELEPHONE ENCOUNTER
Sent in steroids---if she is not having sputum/mucus production it is likely viral. No antibiotics needed.

## 2018-11-29 ENCOUNTER — LAB VISIT (OUTPATIENT)
Dept: LAB | Facility: HOSPITAL | Age: 68
End: 2018-11-29
Attending: NURSE PRACTITIONER
Payer: MEDICARE

## 2018-11-29 DIAGNOSIS — E78.2 MIXED HYPERLIPIDEMIA: ICD-10-CM

## 2018-11-29 LAB
ALBUMIN SERPL BCP-MCNC: 4 G/DL
ALP SERPL-CCNC: 78 U/L
ALT SERPL W/O P-5'-P-CCNC: 19 U/L
ANION GAP SERPL CALC-SCNC: 7 MMOL/L
AST SERPL-CCNC: 25 U/L
BASOPHILS # BLD AUTO: 0.03 K/UL
BASOPHILS NFR BLD: 0.5 %
BILIRUB SERPL-MCNC: 0.4 MG/DL
BUN SERPL-MCNC: 19 MG/DL
CALCIUM SERPL-MCNC: 9.7 MG/DL
CHLORIDE SERPL-SCNC: 101 MMOL/L
CHOLEST SERPL-MCNC: 336 MG/DL
CHOLEST/HDLC SERPL: 3 {RATIO}
CO2 SERPL-SCNC: 27 MMOL/L
CREAT SERPL-MCNC: 0.8 MG/DL
DIFFERENTIAL METHOD: ABNORMAL
EOSINOPHIL # BLD AUTO: 0.1 K/UL
EOSINOPHIL NFR BLD: 1.6 %
ERYTHROCYTE [DISTWIDTH] IN BLOOD BY AUTOMATED COUNT: 12.1 %
EST. GFR  (AFRICAN AMERICAN): >60 ML/MIN/1.73 M^2
EST. GFR  (NON AFRICAN AMERICAN): >60 ML/MIN/1.73 M^2
GLUCOSE SERPL-MCNC: 102 MG/DL
HCT VFR BLD AUTO: 42.8 %
HDLC SERPL-MCNC: 111 MG/DL
HDLC SERPL: 33 %
HGB BLD-MCNC: 14.6 G/DL
IMM GRANULOCYTES # BLD AUTO: 0.01 K/UL
IMM GRANULOCYTES NFR BLD AUTO: 0.2 %
LDLC SERPL CALC-MCNC: 213 MG/DL
LYMPHOCYTES # BLD AUTO: 1.4 K/UL
LYMPHOCYTES NFR BLD: 24.8 %
MCH RBC QN AUTO: 32.9 PG
MCHC RBC AUTO-ENTMCNC: 34.1 G/DL
MCV RBC AUTO: 96 FL
MONOCYTES # BLD AUTO: 0.5 K/UL
MONOCYTES NFR BLD: 8.6 %
NEUTROPHILS # BLD AUTO: 3.5 K/UL
NEUTROPHILS NFR BLD: 64.3 %
NONHDLC SERPL-MCNC: 225 MG/DL
NRBC BLD-RTO: 0 /100 WBC
PLATELET # BLD AUTO: 357 K/UL
PMV BLD AUTO: 9.2 FL
POTASSIUM SERPL-SCNC: 4.3 MMOL/L
PROT SERPL-MCNC: 7.8 G/DL
RBC # BLD AUTO: 4.44 M/UL
SODIUM SERPL-SCNC: 135 MMOL/L
TRIGL SERPL-MCNC: 60 MG/DL
WBC # BLD AUTO: 5.48 K/UL

## 2018-11-29 PROCEDURE — 36415 COLL VENOUS BLD VENIPUNCTURE: CPT | Mod: PO

## 2018-11-29 PROCEDURE — 80061 LIPID PANEL: CPT

## 2018-11-29 PROCEDURE — 80053 COMPREHEN METABOLIC PANEL: CPT

## 2018-11-29 PROCEDURE — 85025 COMPLETE CBC W/AUTO DIFF WBC: CPT

## 2019-01-08 ENCOUNTER — OFFICE VISIT (OUTPATIENT)
Dept: FAMILY MEDICINE | Facility: CLINIC | Age: 69
End: 2019-01-08
Payer: MEDICARE

## 2019-01-08 VITALS
RESPIRATION RATE: 16 BRPM | BODY MASS INDEX: 22.82 KG/M2 | SYSTOLIC BLOOD PRESSURE: 124 MMHG | HEART RATE: 68 BPM | WEIGHT: 124 LBS | HEIGHT: 62 IN | TEMPERATURE: 99 F | DIASTOLIC BLOOD PRESSURE: 74 MMHG

## 2019-01-08 DIAGNOSIS — Z79.899 CHRONIC PRESCRIPTION BENZODIAZEPINE USE: ICD-10-CM

## 2019-01-08 DIAGNOSIS — F41.9 ANXIETY: ICD-10-CM

## 2019-01-08 DIAGNOSIS — G47.00 INSOMNIA, UNSPECIFIED TYPE: ICD-10-CM

## 2019-01-08 DIAGNOSIS — E78.1 PURE HYPERGLYCERIDEMIA: Primary | ICD-10-CM

## 2019-01-08 DIAGNOSIS — M81.0 OSTEOPOROSIS, SENILE: ICD-10-CM

## 2019-01-08 DIAGNOSIS — F32.A DEPRESSION, UNSPECIFIED DEPRESSION TYPE: ICD-10-CM

## 2019-01-08 PROCEDURE — 99214 OFFICE O/P EST MOD 30 MIN: CPT | Mod: PBBFAC,PN | Performed by: FAMILY MEDICINE

## 2019-01-08 PROCEDURE — 99214 OFFICE O/P EST MOD 30 MIN: CPT | Mod: S$PBB,,, | Performed by: FAMILY MEDICINE

## 2019-01-08 PROCEDURE — 99999 PR PBB SHADOW E&M-EST. PATIENT-LVL IV: ICD-10-PCS | Mod: PBBFAC,,, | Performed by: FAMILY MEDICINE

## 2019-01-08 PROCEDURE — 99999 PR PBB SHADOW E&M-EST. PATIENT-LVL IV: CPT | Mod: PBBFAC,,, | Performed by: FAMILY MEDICINE

## 2019-01-08 PROCEDURE — 99214 PR OFFICE/OUTPT VISIT, EST, LEVL IV, 30-39 MIN: ICD-10-PCS | Mod: S$PBB,,, | Performed by: FAMILY MEDICINE

## 2019-01-08 RX ORDER — BUPROPION HCL 300 MG
300 TABLET, EXTENDED RELEASE 24 HR ORAL DAILY
Qty: 90 TABLET | Refills: 3 | Status: SHIPPED | OUTPATIENT
Start: 2019-01-08 | End: 2019-04-09 | Stop reason: SDUPTHER

## 2019-01-08 RX ORDER — TRAZODONE HYDROCHLORIDE 150 MG/1
150 TABLET ORAL NIGHTLY
Qty: 30 TABLET | Refills: 6 | Status: SHIPPED | OUTPATIENT
Start: 2019-01-08 | End: 2019-10-29 | Stop reason: SDUPTHER

## 2019-01-08 RX ORDER — ALPRAZOLAM 0.25 MG/1
0.25 TABLET ORAL NIGHTLY PRN
Qty: 30 TABLET | Refills: 2 | Status: SHIPPED | OUTPATIENT
Start: 2019-01-08 | End: 2019-04-16 | Stop reason: SDUPTHER

## 2019-01-08 NOTE — PROGRESS NOTES
THIS DOCUMENT WAS MADE IN PART WITH VOICE RECOGNITION SOFTWARE.  OCCASIONALLY THIS SOFTWARE WILL MISINTERPRET WORDS OR PHRASES.      Isela Giraldo  1950    Isela was seen today for establish care.    Diagnoses and all orders for this visit:    Pure hyperglyceridemia  -     Lipid panel; Future  This is a chronic problem worsening.  She has an excellent HDL but still hard to tolerate an LDL greater than 200.  She has pre conceived negative opinions based on non medical influence regarding prescription medications.  So at late some ground work to try to educate.  But also note that several years ago her cholesterol was much better regardless so there seems to be significant room for dietary improvement and we will repeat in several months.  Also she is following keto diet.  But like most people they will follow it correctly, introduced too many carbs in continue the fatty.  So not surprisingly cholesterol is elevated.  I have advised her to work on this  .  Anxiety  -     WELLBUTRIN  mg 24 hr tablet; Take 1 tablet (300 mg total) by mouth once daily. Patient requires Brand Name Wellbutrin since she does not respond well to the generic drug.  -     ALPRAZolam (XANAX) 0.25 MG tablet; Take 1 tablet (0.25 mg total) by mouth nightly as needed for Anxiety.  This appears to be a chronic problem.  Looking back she has been stable.  Still had extensive discussion regarding benzodiazepine usage, followups, etc.  She will see me in 6 months    Insomnia, unspecified type  -     traZODone (DESYREL) 150 MG tablet; Take 1 tablet (150 mg total) by mouth every evening.  As above    Chronic prescription benzodiazepine use  As above    Depression, unspecified depression type  Continue Wellbutrin    Osteoporosis, senile  She also has some pre conceived opinions regarding treatment.  I provided some education and advised her to think about options, she was not totally against options after our discussion but still  needs time to contemplate    Appointment duration greater than 25 min., more than 50% face-to-face counseling        Subjective     Chief Complaint   Patient presents with    Establish Care       HPI  Patient new to me.  Scalp lesion, dry scaly  Right calf, laterally seb ker, medially/ ? Dermatofibroma  I did recommend following with Dermatology, do not see anything that looks malignant but if symptoms continue again Dermatology.    HPI elements addressed above in the assessment and plan including problems, diagnosis, stability/instability,  improving/worsening, and chronicity will not be duplicated in this section. Any important additional HPI topics will be discussed here if needed.    Active Ambulatory Problems     Diagnosis Date Noted    Anxiety 10/11/2012    Insomnia 10/11/2012    Osteoporosis 03/27/2014    Osteoporosis, post-menopausal 09/10/2015    Adverse effect of oral bisphosphonates 09/10/2015    Hyperlipidemia 08/11/2016    Depression      Resolved Ambulatory Problems     Diagnosis Date Noted    No Resolved Ambulatory Problems     Past Medical History:   Diagnosis Date    Anxiety     Depression     Osteopenia      Current Outpatient Medications on File Prior to Visit   Medication Sig Dispense Refill    fluticasone (FLONASE) 50 mcg/actuation nasal spray 1 spray by Each Nare route once daily.      albuterol 90 mcg/actuation inhaler Inhale 1 puff into the lungs every 4 (four) hours as needed. 8.5 g 1     No current facility-administered medications on file prior to visit.      Review of patient's allergies indicates:   Allergen Reactions    Fosamax [alendronate]      Did not feel right    Iodine and iodide containing products     Ramelteon Other (See Comments)     Melatonen    Shrimp Hives     Hives and itching head to toe     Social History     Tobacco Use    Smoking status: Never Smoker    Smokeless tobacco: Never Used   Substance Use Topics    Alcohol use: No    Drug use: No      Family History   Problem Relation Age of Onset    Cancer Mother         ccervical    Cataracts Mother     No Known Problems Father     No Known Problems Sister     No Known Problems Brother     Breast cancer Neg Hx     Amblyopia Neg Hx     Blindness Neg Hx     Diabetes Neg Hx     Glaucoma Neg Hx     Hypertension Neg Hx     Macular degeneration Neg Hx     Retinal detachment Neg Hx     Strabismus Neg Hx     Stroke Neg Hx     Thyroid disease Neg Hx          Review of Systems   Constitutional: Negative for fatigue, fever and unexpected weight change.   HENT: Negative for sinus pressure and trouble swallowing.    Eyes: Negative for pain and visual disturbance.   Respiratory: Negative for cough, chest tightness and shortness of breath.    Cardiovascular: Negative for chest pain, palpitations and leg swelling.   Gastrointestinal: Negative for abdominal pain, blood in stool, constipation, diarrhea, nausea and vomiting.   Genitourinary: Negative for dysuria, frequency and hematuria.   Musculoskeletal: Negative for arthralgias, gait problem, myalgias and neck pain.   Skin: Negative for rash and wound.   Neurological: Negative for dizziness, tremors, syncope, numbness and headaches.   Psychiatric/Behavioral: Positive for sleep disturbance. Negative for dysphoric mood and suicidal ideas. The patient is nervous/anxious.        Objective     Physical Exam   Constitutional: She is oriented to person, place, and time. She appears well-developed and well-nourished.   HENT:   Head: Normocephalic and atraumatic.   Eyes: No scleral icterus.   Cardiovascular: Normal rate, regular rhythm and normal heart sounds.   No murmur heard.  Pulmonary/Chest: Effort normal and breath sounds normal. No respiratory distress.   Neurological: She is alert and oriented to person, place, and time.   Skin: Skin is dry. No rash noted. She is not diaphoretic.   Psychiatric: She has a normal mood and affect. Her behavior is normal.  "  Vitals reviewed.    Vitals:    01/08/19 1403   BP: 124/74   BP Location: Right arm   Patient Position: Sitting   BP Method: Large (Manual)   Pulse: 68   Resp: 16   Temp: 98.8 °F (37.1 °C)   TempSrc: Oral   Weight: 56.2 kg (124 lb)   Height: 5' 2" (1.575 m)       MOST RECENT LABS IN OUR ELECTRONIC MEDICAL RECORD:     Results for orders placed or performed in visit on 11/29/18   CBC auto differential   Result Value Ref Range    WBC 5.48 3.90 - 12.70 K/uL    RBC 4.44 4.00 - 5.40 M/uL    Hemoglobin 14.6 12.0 - 16.0 g/dL    Hematocrit 42.8 37.0 - 48.5 %    MCV 96 82 - 98 fL    MCH 32.9 (H) 27.0 - 31.0 pg    MCHC 34.1 32.0 - 36.0 g/dL    RDW 12.1 11.5 - 14.5 %    Platelets 357 (H) 150 - 350 K/uL    MPV 9.2 9.2 - 12.9 fL    Immature Granulocytes 0.2 0.0 - 0.5 %    Gran # (ANC) 3.5 1.8 - 7.7 K/uL    Immature Grans (Abs) 0.01 0.00 - 0.04 K/uL    Lymph # 1.4 1.0 - 4.8 K/uL    Mono # 0.5 0.3 - 1.0 K/uL    Eos # 0.1 0.0 - 0.5 K/uL    Baso # 0.03 0.00 - 0.20 K/uL    nRBC 0 0 /100 WBC    Gran% 64.3 38.0 - 73.0 %    Lymph% 24.8 18.0 - 48.0 %    Mono% 8.6 4.0 - 15.0 %    Eosinophil% 1.6 0.0 - 8.0 %    Basophil% 0.5 0.0 - 1.9 %    Differential Method Automated    Comprehensive metabolic panel   Result Value Ref Range    Sodium 135 (L) 136 - 145 mmol/L    Potassium 4.3 3.5 - 5.1 mmol/L    Chloride 101 95 - 110 mmol/L    CO2 27 23 - 29 mmol/L    Glucose 102 70 - 110 mg/dL    BUN, Bld 19 8 - 23 mg/dL    Creatinine 0.8 0.5 - 1.4 mg/dL    Calcium 9.7 8.7 - 10.5 mg/dL    Total Protein 7.8 6.0 - 8.4 g/dL    Albumin 4.0 3.5 - 5.2 g/dL    Total Bilirubin 0.4 0.1 - 1.0 mg/dL    Alkaline Phosphatase 78 55 - 135 U/L    AST 25 10 - 40 U/L    ALT 19 10 - 44 U/L    Anion Gap 7 (L) 8 - 16 mmol/L    eGFR if African American >60.0 >60 mL/min/1.73 m^2    eGFR if non African American >60.0 >60 mL/min/1.73 m^2   Lipid panel   Result Value Ref Range    Cholesterol 336 (H) 120 - 199 mg/dL    Triglycerides 60 30 - 150 mg/dL     (H) 40 - 75 mg/dL "    LDL Cholesterol 213.0 (H) 63.0 - 159.0 mg/dL    HDL/Chol Ratio 33.0 20.0 - 50.0 %    Total Cholesterol/HDL Ratio 3.0 2.0 - 5.0    Non-HDL Cholesterol 225 mg/dL

## 2019-01-29 ENCOUNTER — OFFICE VISIT (OUTPATIENT)
Dept: PRIMARY CARE CLINIC | Facility: CLINIC | Age: 69
End: 2019-01-29
Payer: MEDICARE

## 2019-01-29 VITALS
DIASTOLIC BLOOD PRESSURE: 80 MMHG | RESPIRATION RATE: 12 BRPM | HEART RATE: 80 BPM | SYSTOLIC BLOOD PRESSURE: 132 MMHG | TEMPERATURE: 99 F

## 2019-01-29 DIAGNOSIS — J34.89 SINUS PRESSURE: Primary | ICD-10-CM

## 2019-01-29 PROCEDURE — 99214 PR OFFICE/OUTPT VISIT, EST, LEVL IV, 30-39 MIN: ICD-10-PCS | Mod: S$PBB,,, | Performed by: NURSE PRACTITIONER

## 2019-01-29 PROCEDURE — 99999 PR PBB SHADOW E&M-EST. PATIENT-LVL IV: ICD-10-PCS | Mod: PBBFAC,,, | Performed by: NURSE PRACTITIONER

## 2019-01-29 PROCEDURE — 99999 PR PBB SHADOW E&M-EST. PATIENT-LVL IV: CPT | Mod: PBBFAC,,, | Performed by: NURSE PRACTITIONER

## 2019-01-29 PROCEDURE — 99214 OFFICE O/P EST MOD 30 MIN: CPT | Mod: PBBFAC,PO | Performed by: NURSE PRACTITIONER

## 2019-01-29 PROCEDURE — 99214 OFFICE O/P EST MOD 30 MIN: CPT | Mod: S$PBB,,, | Performed by: NURSE PRACTITIONER

## 2019-01-29 NOTE — PATIENT INSTRUCTIONS
Continue to increase fluids.  Maybe switch to nasonex nasal spray.  Saline nasal spray liberally and/or neti pot.  Vaporizer.    Recheck if concerns.    If you have any questions, please call.  You can reach us at [phone] Monday through Thursday (except holidays) 10 a.m. to 5 p.m. or call Dr. Lr     Note:  I do not work on Fridays.  So if you have concerns or questions, please contact your primary care provider team or [facility] or go to the Urgent Care on Friday for concerns.     Thank you for using the [facility]!    SOUTH Mcwilliams, CNP, VALERIEP-BC  [facility]    To rate your experience with JAMEL Mcwilliams, click on the link below:        [email or web address]

## 2019-01-29 NOTE — PROGRESS NOTES
"Isela Giraldo is a 68 y.o. female patient of Alivia Cárdenas NP who presents to the clinic today for   Chief Complaint   Patient presents with    Sinus Problem     pain, pressure, sluggishness X one day,    .    HPI    Pt, who is new known to me, reports a new problem to me: yesterday was "absolutely laid out".  Went home and laid in bed for 2 hrs after errands.  Had appt to go to gym today and only did some walking.  He was ill last week, he told her.  Aches and pains in her back and arms.  Has also been shoveling gravel.  Low grade fever (usu 97.+), generally "spacey".  Took children's dose of sudafed and1 ibuprofen today, helped after several hours.  Sinus pressure but no d/c, feels fluid in her ears .    These symptoms began 2 days ago and status is unchanged.     Symptoms are + acute.    Pt denies the following symptoms:  High fever, nasal d/c    Aggravating factors include .    Relieving factors include Children's dose of Sudafed, ibuprofen--helped after hours,walking and taking a shower this morning   .    OTC Medications tried are Chidlren's sudafed, ibuprofen.    Prescription medications taken for symptoms are none.    Pertinent medical history:  H/o sinus and allergy complaints.    Smoking status:  Never     ROS    Constitutional:   High fever  fever, still with some fatigue, no change in appetite.    Head:   Had a horrible headache yesterday, none today  Ears:   No pain.  Eyes:  achey  Nose:   + sinus pain, + congestion, no runny nose, occ post nasal drip (clearing throat).  Throat:  No ST pain,.    Heart:  No palpitations, chest pain.    Lungs:  No difficulty breathing, no coughing.              Symptoms are community acquired.    GI/:  No sxs    MS:  No new bone, joint or muscle problems.    Skin:  No rashes, itching.      PAST MEDICAL HISTORY:  Past Medical History:   Diagnosis Date    Anxiety     Depression     Osteopenia        PAST SURGICAL HISTORY:  Past Surgical History: "   Procedure Laterality Date    rectal fissure surgery         SOCIAL HISTORY:  Social History     Socioeconomic History    Marital status:      Spouse name: Not on file    Number of children: 6    Years of education: Not on file    Highest education level: Not on file   Social Needs    Financial resource strain: Not on file    Food insecurity - worry: Not on file    Food insecurity - inability: Not on file    Transportation needs - medical: Not on file    Transportation needs - non-medical: Not on file   Occupational History    Not on file   Tobacco Use    Smoking status: Never Smoker    Smokeless tobacco: Never Used   Substance and Sexual Activity    Alcohol use: No    Drug use: No    Sexual activity: No     Partners: Male     Comment: rare   Other Topics Concern    Not on file   Social History Narrative    Not on file       FAMILY HISTORY:  Family History   Problem Relation Age of Onset    Cancer Mother         ccervical    Cataracts Mother     No Known Problems Father     No Known Problems Sister     No Known Problems Brother     Breast cancer Neg Hx     Amblyopia Neg Hx     Blindness Neg Hx     Diabetes Neg Hx     Glaucoma Neg Hx     Hypertension Neg Hx     Macular degeneration Neg Hx     Retinal detachment Neg Hx     Strabismus Neg Hx     Stroke Neg Hx     Thyroid disease Neg Hx        ALLERGIES AND MEDICATIONS: updated and reviewed.  Review of patient's allergies indicates:   Allergen Reactions    Fosamax [alendronate]      Did not feel right    Iodine and iodide containing products     Ramelteon Other (See Comments)     Melatonen    Shrimp Hives     Hives and itching head to toe     Current Outpatient Medications   Medication Sig Dispense Refill    albuterol 90 mcg/actuation inhaler Inhale 1 puff into the lungs every 4 (four) hours as needed. 8.5 g 1    ALPRAZolam (XANAX) 0.25 MG tablet Take 1 tablet (0.25 mg total) by mouth nightly as needed for Anxiety. 30  tablet 2    fluticasone (FLONASE) 50 mcg/actuation nasal spray 1 spray by Each Nare route once daily.      traZODone (DESYREL) 150 MG tablet Take 1 tablet (150 mg total) by mouth every evening. 30 tablet 6    WELLBUTRIN  mg 24 hr tablet Take 1 tablet (300 mg total) by mouth once daily. Patient requires Brand Name Wellbutrin since she does not respond well to the generic drug. 90 tablet 3     No current facility-administered medications for this visit.              PHYSICAL EXAM    Alert, coop 68 y.o. female patient in no acute distress.    Vitals:    01/29/19 1347   BP: 132/80   Pulse: 80   Resp: 12   Temp: 98.6 °F (37 °C)     VS reviewed.  VS stable.  CC, nursing note, medications & PMH all reviewed today.    Head:  Normocephalic, atraumatic.    EENT:  EACs patent.  TMs no erythema, bilat diffuse LR, left with small effusions (nonsupurative), no TM perforation.                              Eye lids normal, no discharge present.       Sinus tenderness to palp--none.               Nares--no edema, no d/c present.    Pharynx not injected.                Tonsils not erythematous , not enlarged, no exudate present.    No anterior, no posterior cervical lymph nodes palpable.    Small bilat NT submandibular but no submental or supraclavicular lymph nodes palp.             Resp:  Respirations even, unlabored   Lungs CTA bilat.  No wheezing.  No crackles.  Moves air to bases bilat.    Heart:  RRR, no MRG.    MS:  Ambulates normally.             NEURO:  Alert and oriented x 4.  Responds appropriately during interaction.    Skin:  Warm, dry, color good.    Sinus pressure      Pt today presents with report of feeling exhausted yesterday and having to rest for 2 hrs after running her errands.  Today she feels a little better, went to the gym and walked but didn't do her expected routine.  Has stuffy ear, right one always is, sinus pressure but no d/c, high fever or cough.  Bilat abnormal TMs, small effusion behind the  right TM.  Rest of exam normal/neg..    This is a new problem to me.  No work up is planned.      Review of the patient's record shows multiple visits for sinus complaints.  She uses flonase every days.  She declines ENT referral at this time.    Pt advised to perform comfort measures recommended on patient instruction sheet .    RTC prn  Explained exam findings, diagnosis and treatment plan to patient.  Questions answered and patient states understanding.

## 2019-02-25 ENCOUNTER — TELEPHONE (OUTPATIENT)
Dept: FAMILY MEDICINE | Facility: CLINIC | Age: 69
End: 2019-02-25

## 2019-02-25 NOTE — TELEPHONE ENCOUNTER
She will need to go to an external lab for lipid particle size.  She should check with her insurance to see if covered and where.  Our lab does not do that test.

## 2019-02-25 NOTE — TELEPHONE ENCOUNTER
----- Message from Brenda Kyle sent at 2/25/2019 11:31 AM CST -----  Contact: self  Patient need to nurse regarding lab appointment patient has on 04/09    Patient will like other test added to lab order ,patient will like to discuss with nurse       Please call to advice [phone] (home)

## 2019-02-25 NOTE — TELEPHONE ENCOUNTER
----- Message from Mert Ching sent at 2/25/2019  4:14 PM CST -----  Contact: Patient  Type:  Patient Returning Call    Who Called:  Patient  Who Left Message for Patient:  Génesis  Does the patient know what this is regarding?:  Unknown  Best Call Back Number:  [phone]

## 2019-02-25 NOTE — TELEPHONE ENCOUNTER
Pt states she still wants her complete panel ordered she will call her insurance company in regards to lipid particle size, but is still very adamit about getting complete panel done.

## 2019-02-25 NOTE — TELEPHONE ENCOUNTER
----- Message from Alida Meadows sent at 2/25/2019  3:47 PM CST -----  Contact: self   Placed call to pod, patient miss call from your office please call back at [phone] (bnmv)

## 2019-02-25 NOTE — TELEPHONE ENCOUNTER
Labs ordered for upcoming appt are only for lipid. Pt wants additional labs orders. Pt wants to know about a test for particle size test for her lipid panel, pt states she will not fast unless that lab is ordered to test particle sizes. Pt also requests a TSH, pt wants every test orders for her panel. Please review and advise.

## 2019-02-26 NOTE — TELEPHONE ENCOUNTER
Since no lab was chosen I have written the orders on a prescription pad and she can come pick them up.  Our lab does not do that here.  She will have to take the orders elsewhere.  And if she goes elsewhere she needs to double check with me in a week to make sure they actually send me the results.      Again, I don't mind ordering the test, our lab does not do it so she will need to take the orders elsewhere.  And that is why she will need to check with her insurance to see which lab covers at I do not know that information.  That is between her and her insurance

## 2019-04-02 ENCOUNTER — PATIENT OUTREACH (OUTPATIENT)
Dept: ADMINISTRATIVE | Facility: HOSPITAL | Age: 69
End: 2019-04-02

## 2019-04-02 NOTE — LETTER
April 2, 2019    Isela Giraldo  [address]  [facility] [address]             [facility]  [address]  [facility] [address]  Phone: [phone] Dear Mrs. Giraldo:    [facility] is committed to your overall health.  To help you get the most out of each of your visits, we will review your information to make sure you are up to date on all of your recommended tests and/or procedures.      Dr. Lr has found that your chart shows you may be due for colon cancer screening, mammogram and flu vaccine.     If you have had any of the above done at another facility, please bring the records or information with you so that your record at [facility] will be complete.  If you would like to schedule any of these, please contact me.    If you are currently taking medication, please bring it with you to your appointment for review.    Also, if you have any type of Advanced Directives, please bring them with you to your office visit so we may scan them into your chart.        If you have any questions or concerns, please don't hesitate to call.    Sincerely,        Chalino Silverman LPN Clinical Care Coordinator  [facility]  [address] [facility] [address]  [phone] (p)   [phone] (f)

## 2019-04-05 ENCOUNTER — TELEPHONE (OUTPATIENT)
Dept: FAMILY MEDICINE | Facility: CLINIC | Age: 69
End: 2019-04-05

## 2019-04-05 DIAGNOSIS — R79.9 ABNORMAL FINDING OF BLOOD CHEMISTRY: ICD-10-CM

## 2019-04-05 DIAGNOSIS — Z00.00 PREVENTATIVE HEALTH CARE: ICD-10-CM

## 2019-04-05 DIAGNOSIS — E78.2 MIXED HYPERLIPIDEMIA: ICD-10-CM

## 2019-04-05 DIAGNOSIS — E78.1 PURE HYPERGLYCERIDEMIA: Primary | ICD-10-CM

## 2019-04-05 DIAGNOSIS — F41.9 ANXIETY: ICD-10-CM

## 2019-04-05 DIAGNOSIS — Z00.00 BLOOD TESTS FOR ROUTINE GENERAL PHYSICAL EXAMINATION: ICD-10-CM

## 2019-04-05 DIAGNOSIS — M81.0 OSTEOPOROSIS, UNSPECIFIED OSTEOPOROSIS TYPE, UNSPECIFIED PATHOLOGICAL FRACTURE PRESENCE: Chronic | ICD-10-CM

## 2019-04-05 NOTE — TELEPHONE ENCOUNTER
I need this case referred to patient relations.  See the message list below for nurse.  I have seen this patient once and she's calling making threats and demands. I don't appreciate that and that's no way to develop a trusting relationship. So patient relations need to talk to this patient need to see if something can be worked out.   If not she can go back to her previous provider or find a new provider.   But I'm not going to proceed with a hostile relationship with this patient or allow anyone in my office to be treated with threats and demand by any patient.  This appears abusive and that's manipulative on the patient's part.  It is not fair and that does not promote a healthy ongoing relationship. A trusting comfortable relationship is important for both parties.  The patient needs to be aware of this in advance before I see her again. I'm hopeful that [facility] will act appropriately and help protect us from any further hostility with this patient relationship    I will they need to speak to the patient's relations representative in person before I agree to anything going forward in this case.

## 2019-04-05 NOTE — TELEPHONE ENCOUNTER
----- Message from Kaelyn Bernal sent at 4/5/2019 11:25 AM CDT -----  Contact: patient  Type: Needs Medical Advice    Who Called:  patient  Best Call Back Number: [phone]  Additional Information: patient states that [place] pharmacy has faxed a request for WELLBUTRIN  mg 24 hr tablet twice already. She states that the office has not responded to any of the faxes. Please the patient a call back.

## 2019-04-05 NOTE — TELEPHONE ENCOUNTER
"Pt has several issues to discuss:    1. Pt requested refill of Wellbutrin, and wants it escribed and/or faxed to pharmacy in [place]. Advised pt that office is unable to do so, and that patient will have to  script to mail or faxed herself to the pharmacy of her choosing in [place]. Pt was upset that we could not do this for her, but will be in the office on 4/9/2019 to  her script.     2. Pt wants orders for a CT Cardiac Scoring, informed pt that we can place orders, but w/o documentation insurance may not cover the test. Pt states understanding and will pay out of pocket for test if need. Last CT Cardiac Scoring was in 2016. Ordered by Dr. Vela. Pt was upset that she needed an order to get this test done and that she didn't understand why they could not just run the test at this time without one.     3. Pt wants a full panel for her upcoming lab appt. Pt states, "If I do not get this full panel done when I go in for my lab work on the 9th I will be done with Dr. Lr and [facility] as [facility] whole". Advised pt that I will send a message to provider. And upon review will call her back and link labs to upcoming appt.     Advised pt that all items will be addressed by the provider within one to two business days, as provider is seeing pt in clinic, and will need time to review over all information to see how we can best assist pt with her request. Pt states understanding, but wants this done urgently. As she will be doing lab work on 4/9/2019, and wants to ensure that everything is ready for her by that requested day.  "

## 2019-04-07 RX ORDER — BUPROPION HCL 300 MG
300 TABLET, EXTENDED RELEASE 24 HR ORAL DAILY
Qty: 90 TABLET | Refills: 3 | Status: CANCELLED | OUTPATIENT
Start: 2019-04-07

## 2019-04-07 NOTE — TELEPHONE ENCOUNTER
I've sign the orders. A few things must be documented in advance.    Medicare will not pay for a CT calcium score. They do not pay for this under any circumstances. If the patient wishes to proceed she must understand she will be paying for it. Must document she understands this.  If this angers her I really don't know what to say, refer her to patient relations    Patient had previously requested lipid profile with LDL particle size. To my knowledge [facility] still does not offer this test. If she wants a regular lipid profile done here we can do so. If she prefers the other test, I think I've already given her an order to take elsewhere.

## 2019-04-09 ENCOUNTER — LAB VISIT (OUTPATIENT)
Dept: LAB | Facility: HOSPITAL | Age: 69
End: 2019-04-09
Attending: FAMILY MEDICINE
Payer: MEDICARE

## 2019-04-09 DIAGNOSIS — E78.1 PURE HYPERGLYCERIDEMIA: ICD-10-CM

## 2019-04-09 DIAGNOSIS — F41.9 ANXIETY: ICD-10-CM

## 2019-04-09 RX ORDER — BUPROPION HCL 300 MG
300 TABLET, EXTENDED RELEASE 24 HR ORAL DAILY
Qty: 90 TABLET | Refills: 3 | Status: SHIPPED | OUTPATIENT
Start: 2019-04-09 | End: 2019-10-29 | Stop reason: SDUPTHER

## 2019-04-09 NOTE — TELEPHONE ENCOUNTER
----- Message from Sunitha Perla sent at 4/9/2019  9:20 AM CDT -----  Contact: patient  Type: Needs Medical Advice    Who Called:  patient  Symptoms (please be specific):    How long has patient had these symptoms:    Pharmacy name and phone #:    Best Call Back Number: [phone]  Additional Information: requesting a call back regarding a script she wanted to  at the office WELLBUTRIN  mg 24 hr tablet

## 2019-04-16 ENCOUNTER — OFFICE VISIT (OUTPATIENT)
Dept: FAMILY MEDICINE | Facility: CLINIC | Age: 69
End: 2019-04-16
Payer: MEDICARE

## 2019-04-16 ENCOUNTER — TELEPHONE (OUTPATIENT)
Dept: FAMILY MEDICINE | Facility: CLINIC | Age: 69
End: 2019-04-16

## 2019-04-16 ENCOUNTER — LAB VISIT (OUTPATIENT)
Dept: LAB | Facility: HOSPITAL | Age: 69
End: 2019-04-16
Attending: FAMILY MEDICINE
Payer: MEDICARE

## 2019-04-16 VITALS
TEMPERATURE: 99 F | HEART RATE: 72 BPM | RESPIRATION RATE: 16 BRPM | BODY MASS INDEX: 22.68 KG/M2 | HEIGHT: 62 IN | DIASTOLIC BLOOD PRESSURE: 82 MMHG | SYSTOLIC BLOOD PRESSURE: 130 MMHG

## 2019-04-16 DIAGNOSIS — E55.9 VITAMIN D DEFICIENCY: ICD-10-CM

## 2019-04-16 DIAGNOSIS — F41.9 ANXIETY: ICD-10-CM

## 2019-04-16 DIAGNOSIS — R93.1 AGATSTON CORONARY ARTERY CALCIUM SCORE BETWEEN 200 AND 399: Primary | ICD-10-CM

## 2019-04-16 DIAGNOSIS — E78.1 PURE HYPERGLYCERIDEMIA: ICD-10-CM

## 2019-04-16 DIAGNOSIS — M81.0 OSTEOPOROSIS, POST-MENOPAUSAL: ICD-10-CM

## 2019-04-16 LAB — 25(OH)D3+25(OH)D2 SERPL-MCNC: 88 NG/ML (ref 30–96)

## 2019-04-16 PROCEDURE — 99214 OFFICE O/P EST MOD 30 MIN: CPT | Mod: S$PBB,,, | Performed by: FAMILY MEDICINE

## 2019-04-16 PROCEDURE — 99999 PR PBB SHADOW E&M-EST. PATIENT-LVL III: ICD-10-PCS | Mod: PBBFAC,,, | Performed by: FAMILY MEDICINE

## 2019-04-16 PROCEDURE — 82306 VITAMIN D 25 HYDROXY: CPT

## 2019-04-16 PROCEDURE — 99999 PR PBB SHADOW E&M-EST. PATIENT-LVL III: CPT | Mod: PBBFAC,,, | Performed by: FAMILY MEDICINE

## 2019-04-16 PROCEDURE — 99214 PR OFFICE/OUTPT VISIT, EST, LEVL IV, 30-39 MIN: ICD-10-PCS | Mod: S$PBB,,, | Performed by: FAMILY MEDICINE

## 2019-04-16 PROCEDURE — 99213 OFFICE O/P EST LOW 20 MIN: CPT | Mod: PBBFAC,PN | Performed by: FAMILY MEDICINE

## 2019-04-16 PROCEDURE — 36415 COLL VENOUS BLD VENIPUNCTURE: CPT | Mod: PN

## 2019-04-16 RX ORDER — ALPRAZOLAM 0.25 MG/1
0.25 TABLET ORAL NIGHTLY PRN
Qty: 30 TABLET | Refills: 2 | Status: SHIPPED | OUTPATIENT
Start: 2019-04-16 | End: 2019-10-29 | Stop reason: SDUPTHER

## 2019-04-16 NOTE — PROGRESS NOTES
THIS DOCUMENT WAS MADE IN PART WITH VOICE RECOGNITION SOFTWARE.  OCCASIONALLY THIS SOFTWARE WILL MISINTERPRET WORDS OR PHRASES.      Isela iGraldo  1950    Isela was seen today for follow-up.    Diagnoses and all orders for this visit:    Agatston coronary artery calcium score between 200 and 399  Reviewed and discussed her previous coronary calcium score which was close to 400.  Advised by definition this indicates she has coronary artery disease. She does have at least 1 risk factor that we can modify and that is her cholesterol with a statin medication but she is absolutely against this possibility.  Again I explained the risk and concerns and that we do have something to help reduce her risk.  I also advised consulting with Cardiology but she states she still did full consult Cardiology a few years ago and denies having any active symptoms.    Anxiety  -     ALPRAZolam (XANAX) 0.25 MG tablet; Take 1 tablet (0.25 mg total) by mouth nightly as needed for Anxiety.  Follow every 3-6 months    Vitamin D deficiency  -     Vitamin D; Future    Hyperlipidemia  As above.  She has a very high non HD cholesterol.  Even though she has a protective HDL, the fact that she has a positive coronary calcium score near 400 indicates that we should consider being more aggressive about lowering her lipids with a statin medication but she refuses.    Osteoporosis, post-menopausal  She states she has tried Fosamax in the past but significant side effects.  We will check a vitamin-D level.  She should repeat a bone density in the fall.    Appointment duration greater than 25 min., more than 50% face-to-face counseling        Subjective     Chief Complaint   Patient presents with    Follow-up       HPI  Patient presents today for discussion of labs and renewal of her alprazolam for anxiety.  Patient was pleasant today in person.    HPI elements addressed above in the assessment and plan including problems, diagnosis,  "stability/instability,  improving/worsening, and chronicity will not be duplicated in this section. Any important additional HPI topics will be discussed here if needed.    Active Ambulatory Problems     Diagnosis Date Noted    Anxiety 10/11/2012    Insomnia 10/11/2012    Osteoporosis 03/27/2014    Osteoporosis, post-menopausal 09/10/2015    Adverse effect of oral bisphosphonates 09/10/2015    Hyperlipidemia 08/11/2016    Depression      Resolved Ambulatory Problems     Diagnosis Date Noted    No Resolved Ambulatory Problems     Past Medical History:   Diagnosis Date    Anxiety     Depression     Osteopenia          Review of Systems  She reports no chest pains or shortness of breath or syncope    Objective     Physical Exam  Vitals:    04/16/19 1415   BP: 130/82   BP Location: Left arm   Patient Position: Sitting   BP Method: Large (Manual)   Pulse: 72   Resp: 16   Temp: 99.2 °F (37.3 °C)   TempSrc: Oral   Height: 5' 2" (1.575 m)       MOST RECENT LABS IN OUR ELECTRONIC MEDICAL RECORD:     Results for orders placed or performed in visit on 04/09/19   CBC auto differential   Result Value Ref Range    WBC 5.13 3.90 - 12.70 K/uL    RBC 4.32 4.00 - 5.40 M/uL    Hemoglobin 14.0 12.0 - 16.0 g/dL    Hematocrit 41.2 37.0 - 48.5 %    MCV 95 82 - 98 fL    MCH 32.4 (H) 27.0 - 31.0 pg    MCHC 34.0 32.0 - 36.0 g/dL    RDW 12.2 11.5 - 14.5 %    Platelets 293 150 - 350 K/uL    MPV 9.4 9.2 - 12.9 fL    Immature Granulocytes 0.2 0.0 - 0.5 %    Gran # (ANC) 2.6 1.8 - 7.7 K/uL    Immature Grans (Abs) 0.01 0.00 - 0.04 K/uL    Lymph # 1.7 1.0 - 4.8 K/uL    Mono # 0.5 0.3 - 1.0 K/uL    Eos # 0.3 0.0 - 0.5 K/uL    Baso # 0.03 0.00 - 0.20 K/uL    nRBC 0 0 /100 WBC    Gran% 51.0 38.0 - 73.0 %    Lymph% 32.6 18.0 - 48.0 %    Mono% 9.9 4.0 - 15.0 %    Eosinophil% 5.7 0.0 - 8.0 %    Basophil% 0.6 0.0 - 1.9 %    Differential Method Automated    Comprehensive metabolic panel   Result Value Ref Range    Sodium 139 136 - 145 mmol/L    " Potassium 4.5 3.5 - 5.1 mmol/L    Chloride 102 95 - 110 mmol/L    CO2 29 23 - 29 mmol/L    Glucose 89 70 - 110 mg/dL    BUN, Bld 18 8 - 23 mg/dL    Creatinine 0.9 0.5 - 1.4 mg/dL    Calcium 9.9 8.7 - 10.5 mg/dL    Total Protein 7.4 6.0 - 8.4 g/dL    Albumin 4.0 3.5 - 5.2 g/dL    Total Bilirubin 0.4 0.1 - 1.0 mg/dL    Alkaline Phosphatase 76 55 - 135 U/L    AST 21 10 - 40 U/L    ALT 21 10 - 44 U/L    Anion Gap 8 8 - 16 mmol/L    eGFR if African American >60.0 >60 mL/min/1.73 m^2    eGFR if non African American >60.0 >60 mL/min/1.73 m^2   Lipid panel   Result Value Ref Range    Cholesterol 348 (H) 120 - 199 mg/dL    Triglycerides 60 30 - 150 mg/dL     (H) 40 - 75 mg/dL    LDL Cholesterol 229.0 (H) 63.0 - 159.0 mg/dL    HDL/Chol Ratio 30.7 20.0 - 50.0 %    Total Cholesterol/HDL Ratio 3.3 2.0 - 5.0    Non-HDL Cholesterol 241 mg/dL   TSH   Result Value Ref Range    TSH 1.661 0.400 - 4.000 uIU/mL   T3, free   Result Value Ref Range    T3, Free 2.6 2.3 - 4.2 pg/mL   T4, free   Result Value Ref Range    Free T4 1.11 0.71 - 1.51 ng/dL

## 2019-04-16 NOTE — TELEPHONE ENCOUNTER
----- Message from Devorah Campuzano sent at 4/16/2019  1:54 PM CDT -----  Contact: pt  ..Type: Needs Medical Advice    Who Called: pt  Best Call Back Number:  Additional Information: Pt called to inform that she will be 10 mins late for her appt  Please advise

## 2019-06-11 ENCOUNTER — HOSPITAL ENCOUNTER (OUTPATIENT)
Dept: RADIOLOGY | Facility: HOSPITAL | Age: 69
Discharge: HOME | End: 2019-06-11
Attending: FAMILY MEDICINE
Payer: MEDICARE

## 2019-06-11 DIAGNOSIS — Z00.00 PREVENTATIVE HEALTH CARE: ICD-10-CM

## 2019-06-11 DIAGNOSIS — E78.1 PURE HYPERGLYCERIDEMIA: ICD-10-CM

## 2019-06-11 PROCEDURE — 75571 CT CALCIUM SCORING CARDIAC: ICD-10-PCS | Mod: 26,,, | Performed by: RADIOLOGY

## 2019-06-11 PROCEDURE — 75571 CT HRT W/O DYE W/CA TEST: CPT | Mod: 26,,, | Performed by: RADIOLOGY

## 2019-06-11 PROCEDURE — 75571 CT HRT W/O DYE W/CA TEST: CPT | Mod: TC,PO

## 2019-09-02 ENCOUNTER — OFFICE VISIT (OUTPATIENT)
Dept: URGENT CARE | Facility: CLINIC | Age: 69
End: 2019-09-02
Payer: MEDICARE

## 2019-09-02 VITALS
OXYGEN SATURATION: 97 % | SYSTOLIC BLOOD PRESSURE: 126 MMHG | BODY MASS INDEX: 22.82 KG/M2 | HEART RATE: 89 BPM | RESPIRATION RATE: 16 BRPM | WEIGHT: 124 LBS | TEMPERATURE: 98 F | DIASTOLIC BLOOD PRESSURE: 81 MMHG | HEIGHT: 62 IN

## 2019-09-02 DIAGNOSIS — R09.81 SINUS CONGESTION: Primary | ICD-10-CM

## 2019-09-02 PROCEDURE — 99214 PR OFFICE/OUTPT VISIT, EST, LEVL IV, 30-39 MIN: ICD-10-PCS | Mod: S$GLB,,, | Performed by: PHYSICIAN ASSISTANT

## 2019-09-02 PROCEDURE — 99214 OFFICE O/P EST MOD 30 MIN: CPT | Mod: S$GLB,,, | Performed by: PHYSICIAN ASSISTANT

## 2019-09-02 RX ORDER — AZELASTINE 1 MG/ML
1 SPRAY, METERED NASAL 2 TIMES DAILY
Qty: 30 ML | Refills: 3 | Status: SHIPPED | OUTPATIENT
Start: 2019-09-02 | End: 2020-09-10 | Stop reason: SDUPTHER

## 2019-09-02 NOTE — PATIENT INSTRUCTIONS
"NASAL ALLERGY    Nasal Allergy, also called "Allergic Rhinitis" occurs after exposure to pollen, molds, mildew, animal "dander" (scales from animal skin, hair and feathers), dust, smoke and fumes. (These are called "allergens"). When pollen causes a nasal allergy it is commonly called "Hay Fever".    When these particles contact the lining of the nose, eyes, eyelids, sinuses or throat, they cause the cells to release a chemical called "histamine". Histamine may cause a watery discharge from the eyes or nose. It may also cause violent sneezing, nasal congestion, itching of the eyes, nose, throat and mouth.    PREVENTION:    Nasal allergy cannot be cured but symptoms can be reduced. Avoid or reduce exposure to the allergen when possible.    HOME CARE:    1) DECONGESTANT pills and sprays (Sudafed, NeoSynephrine), reduce tissue swelling and watery discharge. Overuse of nasal decongestant sprays may make symptoms worse, ESPECIALLY IF YOU HAVE HIGH BLOOD PRESSURE. Do not use these more often than recommended. Get an over the counter Nasal Saline spray to supplement Flonase    2) ANTIHISTAMINES block the release of histamine during the allergic response. Antihistamines are more effective when taken BEFORE symptoms develop. Unless a prescription antihistamine was prescribed, you may take CLARITIN (loratadine). (Claritin is an over-the-counter antihistamine that does not cause drowsiness.) You can also consider Xyzal, Allegra, Zyrtec.    3) STEROID nasal sprays (Beconase, Vancenase, Nasalide, Nasocort, Flonase) or oral steroids (Prednisone) may also be prescribed for more severe symptoms. These help to reduce the local inflammation which adds to the allergic response.    4) If you have ASTHMA, pollen season may make your asthma symptoms worse. It is important that you use your asthma medicines as directed during this time to prevent or treat attacks. Some persons with asthma have a worsening of their asthma symptoms when " taking antihistamines. If you notice this, stop the antihistamines and notify your doctor.    5) Consider a Chitto Rhino Nasal and Sinus Rinse 2-3 times/week if your symptoms are chronic. (https://[employer]! Mobile)       If not allergic,take tylenol (acetominophen) for fever control, chills, or body aches every 4 hours. Do not exceed 4000 mg/ day.If not allergic, take Motrin (Ibuprofen) every 4 hours for fever, chills, pain or inflammation. Do not exceed 2400 mg/day. You can alternate taking tylenol and motrin.  If you were prescribed a narcotic medication, do not drive or operate heavy equipment or machinery while taking these medications.  You must understand that you've received an Urgent Care treatment only and that you may be released before all your medical problems are known or treated. You, the patient, will arrange for follow up care as instructed.  Follow up with your PCP or specialty clinic as directed in the next 1-2 weeks if not improved or as needed.  You can call [phone] to schedule an appointment with the appropriate provider.  If your condition worsens we recommend that you receive another evaluation at the emergency room immediately or contact your primary medical clinics after hours call service to discuss your concerns.  Please return here or go to the Emergency Department for any concerns or worsening of condition.

## 2019-09-02 NOTE — PROGRESS NOTES
"Subjective:       Patient ID: Isela Giraldo is a 68 y.o. female.    Vitals:  height is 5' 2" (1.575 m) and weight is 56.2 kg (124 lb). Her oral temperature is 97.7 °F (36.5 °C). Her blood pressure is 126/81 and her pulse is 89. Her respiration is 16 and oxygen saturation is 97%.     Chief Complaint: Nausea    New problem pt states for a weak experiencing dizziness and nausea. Pt states came back from a trip OOT on plane and been feeling like this. Denies fever or other symtoms    Nausea   This is a new problem. The current episode started 1 to 4 weeks ago. The problem occurs constantly. The problem has been gradually worsening. Associated symptoms include nausea. Pertinent negatives include no arthralgias, chest pain, chills, congestion, coughing, fatigue, fever, headaches, joint swelling, myalgias, rash, sore throat, vertigo, vomiting or weakness. Nothing aggravates the symptoms. She has tried nothing for the symptoms. The treatment provided no relief.       Constitution: Negative for chills, fatigue and fever.   HENT: Negative for congestion and sore throat.    Neck: Negative for painful lymph nodes.   Cardiovascular: Negative for chest pain and leg swelling.   Eyes: Negative for double vision and blurred vision.   Respiratory: Negative for cough and shortness of breath.    Gastrointestinal: Positive for nausea. Negative for vomiting and diarrhea.   Genitourinary: Negative for dysuria, frequency, urgency and history of kidney stones.   Musculoskeletal: Negative for joint pain, joint swelling, muscle cramps and muscle ache.   Skin: Negative for color change, pale, rash and bruising.   Allergic/Immunologic: Negative for seasonal allergies.   Neurological: Negative for dizziness, history of vertigo, light-headedness, passing out and headaches.   Hematologic/Lymphatic: Negative for swollen lymph nodes.   Psychiatric/Behavioral: Negative for nervous/anxious, sleep disturbance and depression. The patient is not " nervous/anxious.        Objective:      Physical Exam   Constitutional: She is oriented to person, place, and time. She appears well-developed and well-nourished. She is cooperative.  Non-toxic appearance. She does not appear ill. No distress.   HENT:   Head: Normocephalic and atraumatic.   Right Ear: Hearing, tympanic membrane, external ear and ear canal normal. No tenderness. Tympanic membrane is not injected, not erythematous and not bulging.   Left Ear: Hearing, external ear and ear canal normal. No tenderness. Tympanic membrane is bulging (clear congestion). Tympanic membrane is not injected and not erythematous.   Nose: Nose normal. No mucosal edema, rhinorrhea or nasal deformity. No epistaxis. Right sinus exhibits no maxillary sinus tenderness and no frontal sinus tenderness. Left sinus exhibits no maxillary sinus tenderness and no frontal sinus tenderness.   Mouth/Throat: Uvula is midline, oropharynx is clear and moist and mucous membranes are normal. No trismus in the jaw. Normal dentition. No uvula swelling. No posterior oropharyngeal erythema.   Eyes: Conjunctivae and lids are normal. No scleral icterus.   Sclera clear bilat   Neck: Trachea normal, full passive range of motion without pain and phonation normal. Neck supple.   Cardiovascular: Normal rate, regular rhythm, normal heart sounds, intact distal pulses and normal pulses.   Pulmonary/Chest: Effort normal and breath sounds normal. No respiratory distress.   Abdominal: Soft. Normal appearance and bowel sounds are normal. She exhibits no distension. There is no tenderness.   Musculoskeletal: Normal range of motion. She exhibits no edema or deformity.   Neurological: She is alert and oriented to person, place, and time. She exhibits normal muscle tone. Coordination normal.   Skin: Skin is warm, dry and intact. She is not diaphoretic. No pallor.   Psychiatric: She has a normal mood and affect. Her speech is normal and behavior is normal. Judgment and  "thought content normal. Cognition and memory are normal.   Nursing note and vitals reviewed.      Assessment:       1. Sinus congestion        Plan:         Sinus congestion  -     azelastine (ASTELIN) 137 mcg (0.1 %) nasal spray; 1 spray (137 mcg total) by Nasal route 2 (two) times daily.  Dispense: 30 mL; Refill: 3  -     sodium chloride (OCEAN NASAL) 0.65 % nasal spray; 1 spray by Nasal route as needed for Congestion.  Dispense: 45 mL; Refill: 3      Patient Instructions   NASAL ALLERGY    Nasal Allergy, also called "Allergic Rhinitis" occurs after exposure to pollen, molds, mildew, animal "dander" (scales from animal skin, hair and feathers), dust, smoke and fumes. (These are called "allergens"). When pollen causes a nasal allergy it is commonly called "Hay Fever".    When these particles contact the lining of the nose, eyes, eyelids, sinuses or throat, they cause the cells to release a chemical called "histamine". Histamine may cause a watery discharge from the eyes or nose. It may also cause violent sneezing, nasal congestion, itching of the eyes, nose, throat and mouth.    PREVENTION:    Nasal allergy cannot be cured but symptoms can be reduced. Avoid or reduce exposure to the allergen when possible.    HOME CARE:    1) DECONGESTANT pills and sprays (Sudafed, NeoSynephrine), reduce tissue swelling and watery discharge. Overuse of nasal decongestant sprays may make symptoms worse, ESPECIALLY IF YOU HAVE HIGH BLOOD PRESSURE. Do not use these more often than recommended. Get an over the counter Nasal Saline spray to supplement Flonase    2) ANTIHISTAMINES block the release of histamine during the allergic response. Antihistamines are more effective when taken BEFORE symptoms develop. Unless a prescription antihistamine was prescribed, you may take CLARITIN (loratadine). (Claritin is an over-the-counter antihistamine that does not cause drowsiness.) You can also consider Xyzal, Allegra, Zyrtec.    3) STEROID " nasal sprays (Beconase, Vancenase, Nasalide, Nasocort, Flonase) or oral steroids (Prednisone) may also be prescribed for more severe symptoms. These help to reduce the local inflammation which adds to the allergic response.    4) If you have ASTHMA, pollen season may make your asthma symptoms worse. It is important that you use your asthma medicines as directed during this time to prevent or treat attacks. Some persons with asthma have a worsening of their asthma symptoms when taking antihistamines. If you notice this, stop the antihistamines and notify your doctor.    5) Consider a Ch[place] Rhino Nasal and Sinus Rinse 2-3 times/week if your symptoms are chronic. (https://chitorhino.com)       If not allergic,take tylenol (acetominophen) for fever control, chills, or body aches every 4 hours. Do not exceed 4000 mg/ day.If not allergic, take Motrin (Ibuprofen) every 4 hours for fever, chills, pain or inflammation. Do not exceed 2400 mg/day. You can alternate taking tylenol and motrin.  If you were prescribed a narcotic medication, do not drive or operate heavy equipment or machinery while taking these medications.  You must understand that you've received an Urgent Care treatment only and that you may be released before all your medical problems are known or treated. You, the patient, will arrange for follow up care as instructed.  Follow up with your PCP or specialty clinic as directed in the next 1-2 weeks if not improved or as needed.  You can call [phone] to schedule an appointment with the appropriate provider.  If your condition worsens we recommend that you receive another evaluation at the emergency room immediately or contact your primary medical clinics after hours call service to discuss your concerns.  Please return here or go to the Emergency Department for any concerns or worsening of condition.

## 2019-09-03 ENCOUNTER — TELEPHONE (OUTPATIENT)
Dept: URGENT CARE | Facility: CLINIC | Age: 69
End: 2019-09-03

## 2019-09-03 RX ORDER — AMOXICILLIN AND CLAVULANATE POTASSIUM 875; 125 MG/1; MG/1
1 TABLET, FILM COATED ORAL 2 TIMES DAILY
Qty: 28 TABLET | Refills: 0 | Status: SHIPPED | OUTPATIENT
Start: 2019-09-03 | End: 2019-09-17

## 2019-09-03 NOTE — TELEPHONE ENCOUNTER
Rx sent to pharmacy.  ----- Message from Charisma Caceres MA sent at 9/3/2019 11:37 AM CDT -----  Spoke to pt and she states she feels like shes gotten worse since yesterday. She states that she has been battling this for two weeks. She states her legs are rubbery, shes got a temp of 98.7 she also has sinus pressure. She states she has been using the allergy spray and its not helping. She states in the past she has taken Augmentin that helped. She would like Augmentin sent to [facility]. Please advise.  [phone]

## 2019-09-11 ENCOUNTER — TELEPHONE (OUTPATIENT)
Dept: FAMILY MEDICINE | Facility: CLINIC | Age: 69
End: 2019-09-11

## 2019-09-11 NOTE — TELEPHONE ENCOUNTER
----- Message from Mckenzie Donya sent at 9/11/2019  4:06 PM CDT -----  Contact: patient  Type: Needs Medical Advice    Who Called:  patient  Best Call Back Number:   Additional Information: Requesting a call back from Nurse, regarding a personal referral

## 2019-10-08 ENCOUNTER — PES CALL (OUTPATIENT)
Dept: ADMINISTRATIVE | Facility: CLINIC | Age: 69
End: 2019-10-08

## 2019-10-20 ENCOUNTER — TELEPHONE (OUTPATIENT)
Dept: FAMILY MEDICINE | Facility: CLINIC | Age: 69
End: 2019-10-20

## 2019-10-21 NOTE — TELEPHONE ENCOUNTER
Chart reviewed as I still have a reminder in my box that this patient has not been back to follow up with a CT scan done a few months ago. Patient had a CT calcium score but this study had a few findings that I requested follow up with.   I would still like to discuss this with her in person ASAP as one of the findings will require a follow-up study/CT. If the patient is okay with me requesting a follow-up study without talking to me in person I can do so. But normally I prefer to talk in person before I arrange this.  but we are at the point now where I have to schedule a follow-up CT scan.   Im out this week but I can make arrangements to meet with her

## 2019-10-21 NOTE — TELEPHONE ENCOUNTER
Spoke with patient and advised that Dr Lr would like to follow back up with the CT cardiac scoring with her. She states that she is aware of the results but she will be transferring to Dr Johnson. Advised that Dr Lr would like to order follow up test since he initially ordered the CT scoring test. Patient states that she will review with Dr Johnson. States that her score has improved from the 2017 results. States that Dr Johnson is a better fit for her and she does not wish that you order any test for her nor will she schedule a follow up as she will be seeing Dr Johnson soon.

## 2019-10-23 ENCOUNTER — PES CALL (OUTPATIENT)
Dept: ADMINISTRATIVE | Facility: CLINIC | Age: 69
End: 2019-10-23

## 2019-10-29 ENCOUNTER — OFFICE VISIT (OUTPATIENT)
Dept: FAMILY MEDICINE | Facility: CLINIC | Age: 69
End: 2019-10-29
Payer: MEDICARE

## 2019-10-29 VITALS
TEMPERATURE: 98 F | WEIGHT: 126.5 LBS | OXYGEN SATURATION: 96 % | DIASTOLIC BLOOD PRESSURE: 80 MMHG | HEART RATE: 66 BPM | HEIGHT: 62 IN | BODY MASS INDEX: 23.28 KG/M2 | SYSTOLIC BLOOD PRESSURE: 136 MMHG

## 2019-10-29 DIAGNOSIS — Z12.11 SPECIAL SCREENING FOR MALIGNANT NEOPLASMS, COLON: ICD-10-CM

## 2019-10-29 DIAGNOSIS — R91.1 LUNG NODULE: ICD-10-CM

## 2019-10-29 DIAGNOSIS — Z76.89 ESTABLISHING CARE WITH NEW DOCTOR, ENCOUNTER FOR: Primary | ICD-10-CM

## 2019-10-29 DIAGNOSIS — Z12.31 SCREENING MAMMOGRAM FOR HIGH-RISK PATIENT: ICD-10-CM

## 2019-10-29 DIAGNOSIS — F41.9 ANXIETY: ICD-10-CM

## 2019-10-29 DIAGNOSIS — G47.00 INSOMNIA, UNSPECIFIED TYPE: ICD-10-CM

## 2019-10-29 DIAGNOSIS — Z78.0 POSTMENOPAUSAL: ICD-10-CM

## 2019-10-29 PROCEDURE — 99999 PR PBB SHADOW E&M-EST. PATIENT-LVL V: ICD-10-PCS | Mod: PBBFAC,,, | Performed by: FAMILY MEDICINE

## 2019-10-29 PROCEDURE — 99215 OFFICE O/P EST HI 40 MIN: CPT | Mod: PBBFAC,PN | Performed by: FAMILY MEDICINE

## 2019-10-29 PROCEDURE — 99214 PR OFFICE/OUTPT VISIT, EST, LEVL IV, 30-39 MIN: ICD-10-PCS | Mod: S$PBB,,, | Performed by: FAMILY MEDICINE

## 2019-10-29 PROCEDURE — 99214 OFFICE O/P EST MOD 30 MIN: CPT | Mod: S$PBB,,, | Performed by: FAMILY MEDICINE

## 2019-10-29 PROCEDURE — 99999 PR PBB SHADOW E&M-EST. PATIENT-LVL V: CPT | Mod: PBBFAC,,, | Performed by: FAMILY MEDICINE

## 2019-10-29 RX ORDER — CIPROFLOXACIN AND DEXAMETHASONE 3; 1 MG/ML; MG/ML
SUSPENSION/ DROPS AURICULAR (OTIC)
Refills: 0 | COMMUNITY
Start: 2019-09-30 | End: 2020-06-23

## 2019-10-29 RX ORDER — BUPROPION HCL 300 MG
300 TABLET, EXTENDED RELEASE 24 HR ORAL DAILY
Qty: 90 TABLET | Refills: 3 | Status: SHIPPED | OUTPATIENT
Start: 2019-10-29 | End: 2020-06-23 | Stop reason: SDUPTHER

## 2019-10-29 RX ORDER — TRAZODONE HYDROCHLORIDE 150 MG/1
150 TABLET ORAL NIGHTLY
Qty: 30 TABLET | Refills: 6 | Status: SHIPPED | OUTPATIENT
Start: 2019-10-29 | End: 2020-06-23 | Stop reason: SDUPTHER

## 2019-10-29 RX ORDER — ALPRAZOLAM 0.25 MG/1
0.25 TABLET ORAL NIGHTLY PRN
Qty: 30 TABLET | Refills: 2 | Status: SHIPPED | OUTPATIENT
Start: 2019-10-29 | End: 2020-06-23 | Stop reason: SDUPTHER

## 2019-10-29 RX ORDER — ALPRAZOLAM 0.25 MG/1
TABLET ORAL
Qty: 30 TABLET | Refills: 2 | OUTPATIENT
Start: 2019-10-29

## 2019-10-29 NOTE — PROGRESS NOTES
Subjective:       Patient ID: Isela Giraldo is a 69 y.o. female.    Chief Complaint: Establish Care and Medication Refill      Isela Giraldo is in the office to establish care and review meds.    HPI  Medical hx reviewed, no updates.  Past Medical History:   Diagnosis Date    Anxiety     Depression     Osteopenia      Uses xanax with trazodone for rest. Long-term regimen. She's aware of risks associated with use. Notes that she often uses 1/2 tablet of xanax.   Recalls that she had an improvement in her calcium score with diet/exercise. Aware of elevated lipid profile, but declines statin in favor of lifestyle, homeopathic approach.     Current Outpatient Medications:     albuterol 90 mcg/actuation inhaler, Inhale 1 puff into the lungs every 4 (four) hours as needed., Disp: 8.5 g, Rfl: 1    ALPRAZolam (XANAX) 0.25 MG tablet, Take 1 tablet (0.25 mg total) by mouth nightly as needed for Anxiety., Disp: 30 tablet, Rfl: 2    azelastine (ASTELIN) 137 mcg (0.1 %) nasal spray, 1 spray (137 mcg total) by Nasal route 2 (two) times daily., Disp: 30 mL, Rfl: 3    CIPRODEX 0.3-0.1 % DrpS, APPLY 2 TO 3 DROPS TO AFFECTED NAILS TWICE DAILY, Disp: , Rfl: 0    fluticasone (FLONASE) 50 mcg/actuation nasal spray, 1 spray by Each Nare route once daily., Disp: , Rfl:     sodium chloride (OCEAN NASAL) 0.65 % nasal spray, 1 spray by Nasal route as needed for Congestion., Disp: 45 mL, Rfl: 3    traZODone (DESYREL) 150 MG tablet, Take 1 tablet (150 mg total) by mouth every evening., Disp: 30 tablet, Rfl: 6    WELLBUTRIN  mg 24 hr tablet, Take 1 tablet (300 mg total) by mouth once daily. Patient requires Brand Name Wellbutrin since she does not respond well to the generic drug., Disp: 90 tablet, Rfl: 3    The ASCVD Risk score (Sierracarmen PELLETIER Jr., et al., 2013) failed to calculate for the following reasons:    The valid HDL cholesterol range is 20 to 100 mg/dL    The valid total cholesterol range is 130 to 320  mg/dL     No results found for: HGBA1C  Lab Results   Component Value Date    LDLCALC 229.0 (H) 04/09/2019    CREATININE 0.9 04/09/2019   labs 2019 rev.    Review of Systems   Constitutional: Negative for fatigue and unexpected weight change (tends towards keto diet).   HENT: Negative for congestion and postnasal drip.    Respiratory: Negative for cough and shortness of breath.    Cardiovascular: Negative for chest pain and leg swelling.   Gastrointestinal: Negative for blood in stool, constipation and diarrhea.        No gerd c/o.   Genitourinary: Negative for difficulty urinating, dysuria, frequency, hematuria and urgency (mild).   Musculoskeletal: Negative for arthralgias and joint swelling.        Gym 2x/week.   Skin: Negative for color change and rash.   Neurological: Negative for dizziness (notices related to vertigo which she can correct with positional maneuvers), light-headedness and headaches.   Psychiatric/Behavioral: Negative for dysphoric mood (keeps focused) and sleep disturbance (tends toward nighttime awakening; tried melatonin, but had adverse event).       Objective:      Physical Exam   Constitutional: She is oriented to person, place, and time. She appears well-developed and well-nourished. No distress.   HENT:   Head: Normocephalic and atraumatic.   Right Ear: External ear normal.   Left Ear: External ear normal.   Nose: Nose normal.   Mouth/Throat: Oropharynx is clear and moist. No oropharyngeal exudate.   Eyes: Pupils are equal, round, and reactive to light. Conjunctivae and EOM are normal.   Neck: Normal range of motion. Neck supple. No thyromegaly present.   Cardiovascular: Normal rate and regular rhythm.   Pulmonary/Chest: Effort normal and breath sounds normal. No respiratory distress. She has no wheezes.   Abdominal: Soft. Bowel sounds are normal. She exhibits no distension and no mass. There is no tenderness. There is no rebound and no guarding.   Musculoskeletal: Normal range of motion.  She exhibits no edema.   Lymphadenopathy:     She has no cervical adenopathy.   Neurological: She is alert and oriented to person, place, and time.   Skin: Skin is warm and dry.   Psychiatric: She has a normal mood and affect. Her behavior is normal.   Nursing note and vitals reviewed.          Screening recommendations appropriate to age and health status were reviewed.    Assessment & Plan:    Establishing care with new doctor, encounter for  Chart and hx reviewed.  Anxiety  -     ALPRAZolam (XANAX) 0.25 MG tablet; Take 1 tablet (0.25 mg total) by mouth nightly as needed for Anxiety.  Dispense: 30 tablet; Refill: 2  -     WELLBUTRIN  mg 24 hr tablet; Take 1 tablet (300 mg total) by mouth once daily. Patient requires Brand Name Wellbutrin since she does not respond well to the generic drug.  Dispense: 90 tablet; Refill: 3  Cont current regimen. Discussed xanax policy for refills. Side effects and precautions of medication use reviewed with patient, expressed understanding. No questions or concerns.   Lung nodule  -     NM PET CT Whole Body; Future; Expected date: 10/29/2019  -     Ambulatory referral to Pulmonology  Incidental finding noted on ct cardiac. Recommend pet-ct per radiology and schedule with pulm to review recs for monitoring.   Screening mammogram for high-risk patient  -     Mammo Digital Screening Bilateral With CAD; Future; Expected date: 10/29/2019  Special screening for malignant neoplasms, colon  -     Fecal Immunochemical Test (iFOBT); Future; Expected date: 10/29/2019  Pt declines further colonoscopy for screening. Recalls that she did not have any polyps on previous study. Agrees to fit kit.  Postmenopausal  -     DXA Bone Density Spine And Hip; Future; Expected date: 10/29/2019  On ca+d, maintains exercise.  Insomnia, unspecified type  -     traZODone (DESYREL) 150 MG tablet; Take 1 tablet (150 mg total) by mouth every evening.  Dispense: 30 tablet; Refill: 6  Stable regimen, to  continue.

## 2019-10-30 ENCOUNTER — LAB VISIT (OUTPATIENT)
Dept: LAB | Facility: HOSPITAL | Age: 69
End: 2019-10-30
Attending: FAMILY MEDICINE
Payer: MEDICARE

## 2019-10-30 DIAGNOSIS — Z12.11 SPECIAL SCREENING FOR MALIGNANT NEOPLASMS, COLON: ICD-10-CM

## 2019-10-30 PROCEDURE — 82274 ASSAY TEST FOR BLOOD FECAL: CPT

## 2019-11-05 LAB — HEMOCCULT STL QL IA: NEGATIVE

## 2019-11-19 ENCOUNTER — HOSPITAL ENCOUNTER (OUTPATIENT)
Dept: RADIOLOGY | Facility: HOSPITAL | Age: 69
Discharge: HOME | End: 2019-11-19
Attending: FAMILY MEDICINE
Payer: MEDICARE

## 2019-11-19 DIAGNOSIS — R91.1 LUNG NODULE: ICD-10-CM

## 2019-11-19 PROCEDURE — 78815 NM PET CT ROUTINE: ICD-10-PCS | Mod: 26,PI,, | Performed by: RADIOLOGY

## 2019-11-19 PROCEDURE — 78815 PET IMAGE W/CT SKULL-THIGH: CPT | Mod: TC,PO

## 2019-11-19 PROCEDURE — A9552 F18 FDG: HCPCS | Mod: PO

## 2019-11-19 PROCEDURE — 78815 PET IMAGE W/CT SKULL-THIGH: CPT | Mod: 26,PI,, | Performed by: RADIOLOGY

## 2019-11-22 ENCOUNTER — TELEPHONE (OUTPATIENT)
Dept: FAMILY MEDICINE | Facility: CLINIC | Age: 69
End: 2019-11-22

## 2019-11-22 NOTE — TELEPHONE ENCOUNTER
Reviewed with pt, she would like more details from Dr. Johnson and will be expecting a call next week.

## 2019-11-22 NOTE — LETTER
November 25, 2019                     [facility]  [address]  [place] [address]  Phone: [phone]  Fax: [phone]   Patient: Isela Giraldo   MR Number: [id number]   YOB: 1950   Date of Visit: 11/22/2019     Dear Ms Giraldo     Please call [phone] to schedule with pulmonology per Dr. Johnson.     Sincerely,      Génesis Ching LPN

## 2019-11-22 NOTE — TELEPHONE ENCOUNTER
Nuclear study was reassuring. I'd like for her to discuss monitoring and any other recommendations with pulmonology. Referral prev done.

## 2019-12-09 ENCOUNTER — HOSPITAL ENCOUNTER (OUTPATIENT)
Dept: RADIOLOGY | Facility: HOSPITAL | Age: 69
Discharge: HOME | End: 2019-12-09
Attending: FAMILY MEDICINE
Payer: MEDICARE

## 2019-12-09 DIAGNOSIS — Z12.31 SCREENING MAMMOGRAM FOR HIGH-RISK PATIENT: ICD-10-CM

## 2019-12-09 DIAGNOSIS — Z78.0 POSTMENOPAUSAL: ICD-10-CM

## 2019-12-09 PROCEDURE — 77067 SCR MAMMO BI INCL CAD: CPT | Mod: 26,,, | Performed by: RADIOLOGY

## 2019-12-09 PROCEDURE — 77063 BREAST TOMOSYNTHESIS BI: CPT | Mod: 26,,, | Performed by: RADIOLOGY

## 2019-12-09 PROCEDURE — 77067 MAMMO DIGITAL SCREENING BILAT WITH TOMOSYNTHESIS_CAD: ICD-10-PCS | Mod: 26,,, | Performed by: RADIOLOGY

## 2019-12-09 PROCEDURE — 77080 DXA BONE DENSITY AXIAL: CPT | Mod: TC,PO

## 2019-12-09 PROCEDURE — 77080 DEXA BONE DENSITY SPINE HIP: ICD-10-PCS | Mod: 26,,, | Performed by: RADIOLOGY

## 2019-12-09 PROCEDURE — 77080 DXA BONE DENSITY AXIAL: CPT | Mod: 26,,, | Performed by: RADIOLOGY

## 2019-12-09 PROCEDURE — 77067 SCR MAMMO BI INCL CAD: CPT | Mod: TC,PO

## 2019-12-09 PROCEDURE — 77063 MAMMO DIGITAL SCREENING BILAT WITH TOMOSYNTHESIS_CAD: ICD-10-PCS | Mod: 26,,, | Performed by: RADIOLOGY

## 2020-06-23 ENCOUNTER — OFFICE VISIT (OUTPATIENT)
Dept: FAMILY MEDICINE | Facility: CLINIC | Age: 70
End: 2020-06-23
Payer: MEDICARE

## 2020-06-23 ENCOUNTER — LAB VISIT (OUTPATIENT)
Dept: LAB | Facility: HOSPITAL | Age: 70
End: 2020-06-23
Attending: FAMILY MEDICINE
Payer: MEDICARE

## 2020-06-23 VITALS
HEIGHT: 62 IN | RESPIRATION RATE: 12 BRPM | WEIGHT: 126.5 LBS | DIASTOLIC BLOOD PRESSURE: 78 MMHG | HEART RATE: 72 BPM | BODY MASS INDEX: 23.28 KG/M2 | SYSTOLIC BLOOD PRESSURE: 116 MMHG | TEMPERATURE: 99 F

## 2020-06-23 DIAGNOSIS — G47.00 INSOMNIA, UNSPECIFIED TYPE: ICD-10-CM

## 2020-06-23 DIAGNOSIS — M54.2 POSTERIOR NECK PAIN: Primary | ICD-10-CM

## 2020-06-23 DIAGNOSIS — F41.9 ANXIETY: ICD-10-CM

## 2020-06-23 DIAGNOSIS — B96.89 ACUTE BACTERIAL SINUSITIS: ICD-10-CM

## 2020-06-23 DIAGNOSIS — Z01.84 ENCOUNTER FOR ANTIBODY RESPONSE EXAMINATION: ICD-10-CM

## 2020-06-23 DIAGNOSIS — J01.90 ACUTE BACTERIAL SINUSITIS: ICD-10-CM

## 2020-06-23 LAB — SARS-COV-2 IGG SERPLBLD QL IA.RAPID: NEGATIVE

## 2020-06-23 PROCEDURE — 86769 SARS-COV-2 COVID-19 ANTIBODY: CPT

## 2020-06-23 PROCEDURE — 99214 PR OFFICE/OUTPT VISIT, EST, LEVL IV, 30-39 MIN: ICD-10-PCS | Mod: S$PBB,,, | Performed by: FAMILY MEDICINE

## 2020-06-23 PROCEDURE — 99999 PR PBB SHADOW E&M-EST. PATIENT-LVL IV: CPT | Mod: PBBFAC,,, | Performed by: FAMILY MEDICINE

## 2020-06-23 PROCEDURE — 36415 COLL VENOUS BLD VENIPUNCTURE: CPT | Mod: PN

## 2020-06-23 PROCEDURE — 99214 OFFICE O/P EST MOD 30 MIN: CPT | Mod: PBBFAC,PN | Performed by: FAMILY MEDICINE

## 2020-06-23 PROCEDURE — 99214 OFFICE O/P EST MOD 30 MIN: CPT | Mod: S$PBB,,, | Performed by: FAMILY MEDICINE

## 2020-06-23 PROCEDURE — 99999 PR PBB SHADOW E&M-EST. PATIENT-LVL IV: ICD-10-PCS | Mod: PBBFAC,,, | Performed by: FAMILY MEDICINE

## 2020-06-23 RX ORDER — TRAZODONE HYDROCHLORIDE 150 MG/1
150 TABLET ORAL NIGHTLY
Qty: 30 TABLET | Refills: 6 | Status: SHIPPED | OUTPATIENT
Start: 2020-06-23 | End: 2021-06-21 | Stop reason: SDUPTHER

## 2020-06-23 RX ORDER — BUPROPION HCL 300 MG
300 TABLET, EXTENDED RELEASE 24 HR ORAL DAILY
Qty: 90 TABLET | Refills: 3 | Status: SHIPPED | OUTPATIENT
Start: 2020-06-23 | End: 2020-11-24 | Stop reason: SDUPTHER

## 2020-06-23 RX ORDER — ALPRAZOLAM 0.25 MG/1
0.25 TABLET ORAL NIGHTLY PRN
Qty: 30 TABLET | Refills: 2 | Status: SHIPPED | OUTPATIENT
Start: 2020-06-23 | End: 2021-01-19 | Stop reason: SDUPTHER

## 2020-06-23 RX ORDER — AMOXICILLIN AND CLAVULANATE POTASSIUM 875; 125 MG/1; MG/1
1 TABLET, FILM COATED ORAL 2 TIMES DAILY
Qty: 20 TABLET | Refills: 0 | Status: SHIPPED | OUTPATIENT
Start: 2020-06-23 | End: 2020-09-10 | Stop reason: ALTCHOICE
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
## 2020-06-25 ENCOUNTER — TELEPHONE (OUTPATIENT)
Dept: FAMILY MEDICINE | Facility: CLINIC | Age: 70
End: 2020-06-25

## 2020-06-25 NOTE — TELEPHONE ENCOUNTER
----- Message from Reagan Macias sent at 6/25/2020  1:27 PM CDT -----  Contact: pt  Type:  Patient Returning Call    Who Called:  pt  Who Left Message for Patient:  not sure  Does the patient know what this is regarding?:  test results  Best Call Back Number:  [phone]  Additional Information:

## 2020-06-29 ENCOUNTER — TELEPHONE (OUTPATIENT)
Dept: FAMILY MEDICINE | Facility: CLINIC | Age: 70
End: 2020-06-29

## 2020-06-29 NOTE — TELEPHONE ENCOUNTER
----- Message from Cami Donato sent at 6/29/2020 10:53 AM CDT -----  Regarding: needs referral to physical therapy  Contact: Ariadna bonilla/ [facility]  Type: Needs Medical Advice  Who Called:  Ariadna  Best Call Back Number: [phone]  Additional Information: Pls call Ariadna regarding a referral for P/T for pt. She has an appt today.

## 2020-06-29 NOTE — TELEPHONE ENCOUNTER
----- Message from Cami Donato sent at 6/29/2020 10:53 AM CDT -----  Regarding: needs referral to physical therapy  Contact: Ariadna bonilla/ [facility]  Type: Needs Medical Advice  Who Called:  Ariadna  Best Call Back Number: [phone]  Additional Information: Pls call Ariadna regarding a referral for P/T for pt. She has an appt today.

## 2020-06-29 NOTE — TELEPHONE ENCOUNTER
----- Message from Kaelyn Bernal sent at 6/29/2020  9:25 AM CDT -----  Type: Needs Medical Advice  Who Called:  Ariadna with [facility]  Best Call Back Number: [phone]  Additional Information: pt has appt scheduled for today with [facility]. States that she has not received referral. Please fax to [phone]. Thanks

## 2020-09-10 ENCOUNTER — OFFICE VISIT (OUTPATIENT)
Dept: FAMILY MEDICINE | Facility: CLINIC | Age: 70
End: 2020-09-10
Payer: MEDICARE

## 2020-09-10 ENCOUNTER — NURSE TRIAGE (OUTPATIENT)
Dept: ADMINISTRATIVE | Facility: CLINIC | Age: 70
End: 2020-09-10

## 2020-09-10 VITALS
SYSTOLIC BLOOD PRESSURE: 136 MMHG | TEMPERATURE: 98 F | DIASTOLIC BLOOD PRESSURE: 70 MMHG | HEIGHT: 62 IN | HEART RATE: 86 BPM | RESPIRATION RATE: 20 BRPM | BODY MASS INDEX: 23.14 KG/M2 | OXYGEN SATURATION: 95 %

## 2020-09-10 DIAGNOSIS — J30.9 ALLERGIC RHINITIS, UNSPECIFIED SEASONALITY, UNSPECIFIED TRIGGER: ICD-10-CM

## 2020-09-10 DIAGNOSIS — J02.9 SORE THROAT: ICD-10-CM

## 2020-09-10 DIAGNOSIS — T78.40XA ALLERGIC REACTION, INITIAL ENCOUNTER: Primary | ICD-10-CM

## 2020-09-10 DIAGNOSIS — R09.81 SINUS CONGESTION: ICD-10-CM

## 2020-09-10 LAB
CTP QC/QA: YES
S PYO RRNA THROAT QL PROBE: NEGATIVE

## 2020-09-10 PROCEDURE — 99214 PR OFFICE/OUTPT VISIT, EST, LEVL IV, 30-39 MIN: ICD-10-PCS | Mod: 25,S$GLB,, | Performed by: NURSE PRACTITIONER

## 2020-09-10 PROCEDURE — 87880 POCT RAPID STREP A: ICD-10-PCS | Mod: QW,,, | Performed by: NURSE PRACTITIONER

## 2020-09-10 PROCEDURE — 87880 STREP A ASSAY W/OPTIC: CPT | Mod: QW,,, | Performed by: NURSE PRACTITIONER

## 2020-09-10 PROCEDURE — 96372 THER/PROPH/DIAG INJ SC/IM: CPT | Mod: S$GLB,,, | Performed by: NURSE PRACTITIONER

## 2020-09-10 PROCEDURE — 99214 OFFICE O/P EST MOD 30 MIN: CPT | Mod: 25,S$GLB,, | Performed by: NURSE PRACTITIONER

## 2020-09-10 PROCEDURE — 96372 PR INJECTION,THERAP/PROPH/DIAG2ST, IM OR SUBCUT: ICD-10-PCS | Mod: S$GLB,,, | Performed by: NURSE PRACTITIONER

## 2020-09-10 RX ORDER — DEXAMETHASONE SODIUM PHOSPHATE 4 MG/ML
8 INJECTION, SOLUTION INTRA-ARTICULAR; INTRALESIONAL; INTRAMUSCULAR; INTRAVENOUS; SOFT TISSUE
Status: COMPLETED | OUTPATIENT
Start: 2020-09-10 | End: 2020-09-10

## 2020-09-10 RX ORDER — AMOXICILLIN 500 MG/1
500 TABLET, FILM COATED ORAL EVERY 12 HOURS
Qty: 20 TABLET | Refills: 0 | Status: SHIPPED | OUTPATIENT
Start: 2020-09-10 | End: 2020-09-10 | Stop reason: CLARIF

## 2020-09-10 RX ORDER — AMOXICILLIN AND CLAVULANATE POTASSIUM 875; 125 MG/1; MG/1
1 TABLET, FILM COATED ORAL 2 TIMES DAILY
Qty: 20 TABLET | Refills: 0 | Status: SHIPPED | OUTPATIENT
Start: 2020-09-10 | End: 2020-09-20

## 2020-09-10 RX ORDER — AZELASTINE 1 MG/ML
1 SPRAY, METERED NASAL 2 TIMES DAILY
Qty: 30 ML | Refills: 3 | Status: SHIPPED | OUTPATIENT
Start: 2020-09-10 | End: 2023-01-06 | Stop reason: ALTCHOICE

## 2020-09-10 RX ADMIN — DEXAMETHASONE SODIUM PHOSPHATE 8 MG: 4 INJECTION, SOLUTION INTRA-ARTICULAR; INTRALESIONAL; INTRAMUSCULAR; INTRAVENOUS; SOFT TISSUE at 12:09

## 2020-09-10 NOTE — TELEPHONE ENCOUNTER
No one answered at PCP.     Day before yesterday went out at dusk. Has allergies. having terrible allergies and getting worse. Throat is raw and having difficulty swallowing with some constriction. Not gasping for air. Able to swallow liquids. Was able to get pills down with difficulty. + dizziness. Is taking chlortrimitone. Does not feel like the asthma. All is to head and throat. St=uffiness. swollen sinuses.    Not SOB, no fever.pain to throat with swallowing. Throat is red no spots. Moderate pain.  Day before yesterday did stairs indoors. [place] may have over done it. Uses daily flonase.     Protocol advice given and pt verbalizes understanding.     Reason for Disposition   MODERATE-SEVERE nasal allergy symptoms (i.e., interfere with sleep, school, or work) and taking antihistamines > 2 days    Additional Information   Negative: Patient sounds very sick or weak to the triager   Negative: Lots of coughing   Negative: Lots of yellow or green discharge from nose, present > 3 days   Negative: Nasal discharge present > 10 days   Negative: Severe difficulty breathing (e.g., struggling for each breath, speaks in single words)   Negative: Sounds like a life-threatening emergency to the triager   Negative: Drooling or spitting out saliva (because can't swallow)   Negative: Unable to open mouth completely   Negative: Drinking very little and has signs of dehydration (e.g., no urine > 12 hours, very dry mouth, very lightheaded)   Negative: Patient sounds very sick or weak to the triager   Negative: Difficulty breathing (per caller) but not severe   Negative: Fever > 103 F (39.4 C)   Negative: Refuses to drink anything for > 12 hours   Negative: SEVERE sore throat pain   Negative: Pus on tonsils (back of throat) and swollen neck lymph nodes ('glands')   Negative: Earache also present   Negative: Widespread rash (especially chest and abdomen)   Negative: Diabetes mellitus or weak immune system (e.g., HIV  positive, cancer chemo, splenectomy, organ transplant, chronic steroids)   Negative: History of rheumatic fever    Protocols used: HAY FEVER - NASAL ALLERGIES-A-OH, SORE THROAT-A-OH

## 2020-09-10 NOTE — PROGRESS NOTES
Subjective:       Patient ID: Isela Giraldo is a 69 y.o. female.    Chief Complaint: Sore Throat    HPI thinks having high allergy reaction. States onset over the past week with increased PND, raw throat, itchy eyes. States the flonase not helping. She has used astelin in the past and wants that refilled. She states most anti-histamines dry her out too bad. She denies any fever. States it hurts to swallow. Trying to hydrate well. See ROS.    The following portion of the patients history was reviewed and updated as appropriate: allergies, current medications, past medical and surgical history. Past social history and problem list reviewed. Family PMH and Past social history reviewed. Tobacco, Illicit drug use reviewed.      Review of patient's allergies indicates:   Allergen Reactions    Fosamax [alendronate]      Did not feel right    Iodine and iodide containing products     Ramelteon Other (See Comments)     Melatonen    Shrimp Hives     Hives and itching head to toe         Current Outpatient Medications:     albuterol 90 mcg/actuation inhaler, Inhale 1 puff into the lungs every 4 (four) hours as needed., Disp: 8.5 g, Rfl: 1    ALPRAZolam (XANAX) 0.25 MG tablet, Take 1 tablet (0.25 mg total) by mouth nightly as needed for Anxiety., Disp: 30 tablet, Rfl: 2    fluticasone (FLONASE) 50 mcg/actuation nasal spray, 1 spray by Each Nare route once daily., Disp: , Rfl:     traZODone (DESYREL) 150 MG tablet, Take 1 tablet (150 mg total) by mouth every evening., Disp: 30 tablet, Rfl: 6    WELLBUTRIN  mg 24 hr tablet, Take 1 tablet (300 mg total) by mouth once daily. Patient requires Brand Name Wellbutrin since she does not respond well to the generic drug., Disp: 90 tablet, Rfl: 3    amoxicillin-clavulanate 875-125mg (AUGMENTIN) 875-125 mg per tablet, Take 1 tablet by mouth 2 (two) times daily. (Patient not taking: Reported on 9/10/2020), Disp: 20 tablet, Rfl: 0    azelastine (ASTELIN) 137 mcg  (0.1 %) nasal spray, 1 spray (137 mcg total) by Nasal route 2 (two) times daily. (Patient not taking: Reported on 9/10/2020), Disp: 30 mL, Rfl: 3    Past Medical History:   Diagnosis Date    Anxiety     Depression     Osteoporosis     recalls intolerant of fosamax with joint pain       Past Surgical History:   Procedure Laterality Date    rectal fissure surgery         Social History     Socioeconomic History    Marital status:      Spouse name: Not on file    Number of children: 6    Years of education: Not on file    Highest education level: Not on file   Occupational History    Not on file   Social Needs    Financial resource strain: Not on file    Food insecurity     Worry: Not on file     Inability: Not on file    Transportation needs     Medical: Not on file     Non-medical: Not on file   Tobacco Use    Smoking status: Never Smoker    Smokeless tobacco: Never Used   Substance and Sexual Activity    Alcohol use: No    Drug use: No    Sexual activity: Never     Partners: Male     Comment: rare   Lifestyle    Physical activity     Days per week: Not on file     Minutes per session: Not on file    Stress: Not on file   Relationships    Social connections     Talks on phone: Not on file     Gets together: Not on file     Attends Confucianism service: Not on file     Active member of club or organization: Not on file     Attends meetings of clubs or organizations: Not on file     Relationship status: Not on file   Other Topics Concern    Not on file   Social History Narrative    Not on file     Review of Systems   Constitutional: Negative for fatigue and fever.   HENT: Positive for postnasal drip, rhinorrhea, sinus pressure, sneezing, sore throat (throat feels swollen) and trouble swallowing. Negative for congestion, sinus pain and voice change.    Eyes: Negative for visual disturbance.   Respiratory: Negative for cough, chest tightness, shortness of breath and wheezing.    Cardiovascular:  "Negative for chest pain and palpitations.   Gastrointestinal: Negative for abdominal pain, diarrhea, nausea and vomiting.   Musculoskeletal: Negative for arthralgias, back pain and gait problem.   Skin:        Upper lip a little swollen   Neurological: Negative for dizziness, weakness and headaches.       Objective:      /70   Pulse 86   Temp 97.7 °F (36.5 °C) (Temporal)   Resp 20   Ht 5' 2" (1.575 m)   SpO2 95%   BMI 23.14 kg/m²      Physical Exam  Constitutional:       General: She is not in acute distress.     Appearance: Normal appearance. She is well-developed.   HENT:      Head: Normocephalic.      Right Ear: Ear canal and external ear normal. Tympanic membrane is injected.      Left Ear: Tympanic membrane, ear canal and external ear normal.      Nose: Mucosal edema and rhinorrhea present.      Right Sinus: Maxillary sinus tenderness present.      Left Sinus: Maxillary sinus tenderness present.      Mouth/Throat:      Mouth: Mucous membranes are dry.      Pharynx: Posterior oropharyngeal erythema and uvula swelling present.      Comments: Upper lip is swollen  Eyes:      Conjunctiva/sclera: Conjunctivae normal.      Pupils: Pupils are equal, round, and reactive to light.   Neck:      Musculoskeletal: Normal range of motion and neck supple. No edema.      Thyroid: No thyromegaly.      Trachea: Trachea normal. No tracheal tenderness or tracheal deviation.   Cardiovascular:      Rate and Rhythm: Normal rate and regular rhythm.      Heart sounds: Normal heart sounds. No murmur. No gallop.    Pulmonary:      Effort: Pulmonary effort is normal. No respiratory distress.      Breath sounds: Normal breath sounds. No stridor. No wheezing, rhonchi or rales.   Musculoskeletal: Normal range of motion.      Comments: Gait and coordination normal.  strong, equal. Upper and lower extremity strength normal.    Skin:     General: Skin is warm and dry.      Capillary Refill: Capillary refill takes less than 2 " seconds.      Findings: No lesion or rash.   Neurological:      Mental Status: She is alert and oriented to person, place, and time.   Psychiatric:         Attention and Perception: Attention and perception normal.         Mood and Affect: Mood and affect normal.         Speech: Speech normal.         Behavior: Behavior normal.         Assessment:       1. Allergic reaction, initial encounter    2. Sinus congestion    3. Sore throat    4. Allergic rhinitis, unspecified seasonality, unspecified trigger        Plan:       Allergic reaction, initial encounter: will give steroid injection for inflammation.  -     dexamethasone injection 8 mg:  Risks and benefits discussed. Potential side effects such as anxiety, palpitations and flushing discussed. May increase blood glucose levels over the next 48 hours. Potential for skin atrophy at injection site. Tolerated injection well.    Sinus congestion: refill astelin BID  -     azelastine (ASTELIN) 137 mcg (0.1 %) nasal spray; 1 spray (137 mcg total) by Nasal route 2 (two) times daily.  Dispense: 30 mL; Refill: 3    Sore throat: strep negative.   -     POCT rapid strep A    Allergic rhinitis, unspecified seasonality, unspecified trigger: continue nasal spray.      Continue current medication  Take medications only as prescribed  Healthy diet, exercise  Adequate rest  Adequate hydration  Avoid allergens  Avoid excessive caffeine     follow up one week if not improving.

## 2020-11-19 ENCOUNTER — TELEPHONE (OUTPATIENT)
Dept: FAMILY MEDICINE | Facility: CLINIC | Age: 70
End: 2020-11-19

## 2020-11-19 DIAGNOSIS — F41.9 ANXIETY: ICD-10-CM

## 2020-11-19 NOTE — TELEPHONE ENCOUNTER
----- Message from Cally Maya sent at 11/19/2020 11:46 AM CST -----  Regarding: medication  Type: Needs Medical Advice  Who Called: pt  Symptoms (please be specific):    How long has patient had these symptoms:    Pharmacy name and phone #:    Best Call Back Number:  Additional Information: [place] her medication   WELLBUTRIN  mg 24 hr tablet she stated will the provider office pls respond to the fax thanks

## 2020-11-19 NOTE — TELEPHONE ENCOUNTER
Spoke with pt, she states fax was sent for refill for wellbutrin for her [place] pharmacy.  Order #[id number]

## 2020-11-23 ENCOUNTER — TELEPHONE (OUTPATIENT)
Dept: FAMILY MEDICINE | Facility: CLINIC | Age: 70
End: 2020-11-23

## 2020-11-23 NOTE — TELEPHONE ENCOUNTER
Spoke with pt, she states fax was sent for refill for wellbutrin for her [place] pharmacy.  Order #[id number]

## 2020-11-23 NOTE — TELEPHONE ENCOUNTER
----- Message from Pita Wilcox sent at 11/23/2020  4:34 PM CST -----  Regarding: rt a missed call   Type:  Patient Returning Call    Who Called: CONCEPCIÓN ZELAYA [[id number]]    Who Left Message for Patient:roberto    Does the patient know what this is regarding?yes    Best Call Back Number:[phone]    Additional Information: [facility]

## 2020-11-23 NOTE — TELEPHONE ENCOUNTER
Called pt to verify which pharmacy Wellbutrin rx needing to be sent to. Looks like addressed in another encounter.

## 2020-11-24 RX ORDER — BUPROPION HCL 300 MG
300 TABLET, EXTENDED RELEASE 24 HR ORAL DAILY
Qty: 90 TABLET | Refills: 3 | Status: SHIPPED | OUTPATIENT
Start: 2020-11-24 | End: 2021-06-21 | Stop reason: SDUPTHER

## 2020-11-24 NOTE — TELEPHONE ENCOUNTER
Spoke with customer service at [facility]. Faxed printed Wellbutrin rx with pt order number to Fax #[phone]. Pt aware.

## 2021-01-19 ENCOUNTER — PATIENT OUTREACH (OUTPATIENT)
Dept: ADMINISTRATIVE | Facility: OTHER | Age: 71
End: 2021-01-19

## 2021-01-19 DIAGNOSIS — F41.9 ANXIETY: ICD-10-CM

## 2021-01-19 DIAGNOSIS — Z12.11 ENCOUNTER FOR FECAL IMMUNOCHEMICAL TEST SCREENING: Primary | ICD-10-CM

## 2021-01-21 ENCOUNTER — HOSPITAL ENCOUNTER (OUTPATIENT)
Dept: RADIOLOGY | Facility: HOSPITAL | Age: 71
Discharge: HOME | End: 2021-01-21
Attending: FAMILY MEDICINE
Payer: MEDICARE

## 2021-01-21 VITALS — BODY MASS INDEX: 22.98 KG/M2 | WEIGHT: 125.69 LBS

## 2021-01-21 DIAGNOSIS — Z12.31 SCREENING MAMMOGRAM, ENCOUNTER FOR: ICD-10-CM

## 2021-01-21 PROCEDURE — 77067 SCR MAMMO BI INCL CAD: CPT | Mod: 26,,, | Performed by: RADIOLOGY

## 2021-01-21 PROCEDURE — 77063 MAMMO DIGITAL SCREENING BILAT WITH TOMO: ICD-10-PCS | Mod: 26,,, | Performed by: RADIOLOGY

## 2021-01-21 PROCEDURE — 77063 BREAST TOMOSYNTHESIS BI: CPT | Mod: 26,,, | Performed by: RADIOLOGY

## 2021-01-21 PROCEDURE — 77067 SCR MAMMO BI INCL CAD: CPT | Mod: TC,PO

## 2021-01-21 PROCEDURE — 77067 MAMMO DIGITAL SCREENING BILAT WITH TOMO: ICD-10-PCS | Mod: 26,,, | Performed by: RADIOLOGY

## 2021-01-21 RX ORDER — ALPRAZOLAM 0.25 MG/1
0.25 TABLET ORAL NIGHTLY PRN
Qty: 30 TABLET | Refills: 1 | Status: SHIPPED | OUTPATIENT
Start: 2021-01-21 | End: 2021-06-21 | Stop reason: SDUPTHER

## 2021-02-01 ENCOUNTER — OFFICE VISIT (OUTPATIENT)
Dept: OBSTETRICS AND GYNECOLOGY | Facility: CLINIC | Age: 71
End: 2021-02-01
Payer: MEDICARE

## 2021-02-01 VITALS — HEIGHT: 62 IN | SYSTOLIC BLOOD PRESSURE: 124 MMHG | BODY MASS INDEX: 22.98 KG/M2 | DIASTOLIC BLOOD PRESSURE: 74 MMHG

## 2021-02-01 DIAGNOSIS — Z00.00 PREVENTATIVE HEALTH CARE: ICD-10-CM

## 2021-02-01 DIAGNOSIS — Z01.419 GYNECOLOGIC EXAM NORMAL: Primary | ICD-10-CM

## 2021-02-01 PROCEDURE — 99213 OFFICE O/P EST LOW 20 MIN: CPT | Mod: PBBFAC,PN | Performed by: SPECIALIST

## 2021-02-01 PROCEDURE — G0101 PR CA SCREEN;PELVIC/BREAST EXAM: ICD-10-PCS | Mod: S$PBB,,, | Performed by: SPECIALIST

## 2021-02-01 PROCEDURE — G0101 CA SCREEN;PELVIC/BREAST EXAM: HCPCS | Mod: S$PBB,,, | Performed by: SPECIALIST

## 2021-02-01 PROCEDURE — 99999 PR PBB SHADOW E&M-EST. PATIENT-LVL III: CPT | Mod: PBBFAC,,, | Performed by: SPECIALIST

## 2021-02-01 PROCEDURE — 88175 CYTOPATH C/V AUTO FLUID REDO: CPT

## 2021-02-01 PROCEDURE — 87624 HPV HI-RISK TYP POOLED RSLT: CPT

## 2021-02-01 PROCEDURE — 99999 PR PBB SHADOW E&M-EST. PATIENT-LVL III: ICD-10-PCS | Mod: PBBFAC,,, | Performed by: SPECIALIST

## 2021-02-01 RX ORDER — MOMETASONE FUROATE 1 MG/G
CREAM TOPICAL
COMMUNITY
Start: 2021-01-02 | End: 2021-06-21

## 2021-02-09 LAB
HPV HR 12 DNA SPEC QL NAA+PROBE: NEGATIVE
HPV16 AG SPEC QL: NEGATIVE
HPV18 DNA SPEC QL NAA+PROBE: NEGATIVE

## 2021-02-26 ENCOUNTER — OFFICE VISIT (OUTPATIENT)
Dept: FAMILY MEDICINE | Facility: CLINIC | Age: 71
End: 2021-02-26
Payer: MEDICARE

## 2021-02-26 VITALS
WEIGHT: 127 LBS | BODY MASS INDEX: 23.37 KG/M2 | SYSTOLIC BLOOD PRESSURE: 136 MMHG | TEMPERATURE: 99 F | RESPIRATION RATE: 14 BRPM | HEIGHT: 62 IN | DIASTOLIC BLOOD PRESSURE: 82 MMHG | HEART RATE: 76 BPM

## 2021-02-26 DIAGNOSIS — M62.830 SPASM OF MUSCLE OF LOWER BACK: Primary | ICD-10-CM

## 2021-02-26 DIAGNOSIS — F41.9 ANXIETY: ICD-10-CM

## 2021-02-26 DIAGNOSIS — Z12.11 SPECIAL SCREENING FOR MALIGNANT NEOPLASMS, COLON: ICD-10-CM

## 2021-02-26 PROCEDURE — 99214 OFFICE O/P EST MOD 30 MIN: CPT | Mod: PBBFAC,PN | Performed by: FAMILY MEDICINE

## 2021-02-26 PROCEDURE — 99999 PR PBB SHADOW E&M-EST. PATIENT-LVL IV: ICD-10-PCS | Mod: PBBFAC,,, | Performed by: FAMILY MEDICINE

## 2021-02-26 PROCEDURE — 99999 PR PBB SHADOW E&M-EST. PATIENT-LVL IV: CPT | Mod: PBBFAC,,, | Performed by: FAMILY MEDICINE

## 2021-02-26 PROCEDURE — 99214 OFFICE O/P EST MOD 30 MIN: CPT | Mod: S$PBB,,, | Performed by: FAMILY MEDICINE

## 2021-02-26 PROCEDURE — 99214 PR OFFICE/OUTPT VISIT, EST, LEVL IV, 30-39 MIN: ICD-10-PCS | Mod: S$PBB,,, | Performed by: FAMILY MEDICINE

## 2021-03-01 LAB
FINAL PATHOLOGIC DIAGNOSIS: NORMAL
Lab: NORMAL

## 2021-03-10 ENCOUNTER — TELEPHONE (OUTPATIENT)
Dept: FAMILY MEDICINE | Facility: CLINIC | Age: 71
End: 2021-03-10

## 2021-03-12 ENCOUNTER — LAB VISIT (OUTPATIENT)
Dept: LAB | Facility: HOSPITAL | Age: 71
End: 2021-03-12
Attending: FAMILY MEDICINE
Payer: MEDICARE

## 2021-03-12 DIAGNOSIS — Z12.11 SPECIAL SCREENING FOR MALIGNANT NEOPLASMS, COLON: ICD-10-CM

## 2021-03-12 PROCEDURE — 82274 ASSAY TEST FOR BLOOD FECAL: CPT | Performed by: FAMILY MEDICINE

## 2021-03-15 LAB — HEMOCCULT STL QL IA: NEGATIVE

## 2021-05-05 ENCOUNTER — PATIENT OUTREACH (OUTPATIENT)
Dept: ADMINISTRATIVE | Facility: OTHER | Age: 71
End: 2021-05-05

## 2021-05-05 ENCOUNTER — OFFICE VISIT (OUTPATIENT)
Dept: OTOLARYNGOLOGY | Facility: CLINIC | Age: 71
End: 2021-05-05
Payer: MEDICARE

## 2021-05-05 VITALS — BODY MASS INDEX: 23.37 KG/M2 | HEIGHT: 62 IN | WEIGHT: 127 LBS

## 2021-05-05 DIAGNOSIS — J34.89 SINUS PRESSURE: Primary | ICD-10-CM

## 2021-05-05 DIAGNOSIS — R51.9 SINUS HEADACHE: ICD-10-CM

## 2021-05-05 DIAGNOSIS — R42 DYSEQUILIBRIUM: ICD-10-CM

## 2021-05-05 DIAGNOSIS — J32.8 OTHER CHRONIC SINUSITIS: ICD-10-CM

## 2021-05-05 DIAGNOSIS — R09.82 POST-NASAL DRIP: ICD-10-CM

## 2021-05-05 PROCEDURE — 99999 PR PBB SHADOW E&M-EST. PATIENT-LVL IV: ICD-10-PCS | Mod: PBBFAC,,, | Performed by: NURSE PRACTITIONER

## 2021-05-05 PROCEDURE — 99213 OFFICE O/P EST LOW 20 MIN: CPT | Mod: S$PBB,ICN,, | Performed by: NURSE PRACTITIONER

## 2021-05-05 PROCEDURE — 99213 PR OFFICE/OUTPT VISIT, EST, LEVL III, 20-29 MIN: ICD-10-PCS | Mod: S$PBB,ICN,, | Performed by: NURSE PRACTITIONER

## 2021-05-05 PROCEDURE — 99999 PR PBB SHADOW E&M-EST. PATIENT-LVL IV: CPT | Mod: PBBFAC,,, | Performed by: NURSE PRACTITIONER

## 2021-05-05 PROCEDURE — 99214 OFFICE O/P EST MOD 30 MIN: CPT | Mod: PBBFAC,PO | Performed by: NURSE PRACTITIONER

## 2021-05-12 ENCOUNTER — TELEPHONE (OUTPATIENT)
Dept: OTOLARYNGOLOGY | Facility: CLINIC | Age: 71
End: 2021-05-12

## 2021-05-12 ENCOUNTER — HOSPITAL ENCOUNTER (OUTPATIENT)
Dept: RADIOLOGY | Facility: HOSPITAL | Age: 71
Discharge: HOME | End: 2021-05-12
Attending: NURSE PRACTITIONER
Payer: MEDICARE

## 2021-05-12 DIAGNOSIS — J32.8 OTHER CHRONIC SINUSITIS: ICD-10-CM

## 2021-05-12 PROCEDURE — 70486 CT MAXILLOFACIAL W/O DYE: CPT | Mod: TC,PO

## 2021-05-12 PROCEDURE — 70486 CT MAXILLOFACIAL W/O DYE: CPT | Mod: 26,,, | Performed by: RADIOLOGY

## 2021-05-12 PROCEDURE — 70486 CT SINUSES WITHOUT CONTRAST: ICD-10-PCS | Mod: 26,,, | Performed by: RADIOLOGY

## 2021-06-21 ENCOUNTER — OFFICE VISIT (OUTPATIENT)
Dept: FAMILY MEDICINE | Facility: CLINIC | Age: 71
End: 2021-06-21
Payer: MEDICARE

## 2021-06-21 ENCOUNTER — LAB VISIT (OUTPATIENT)
Dept: LAB | Facility: HOSPITAL | Age: 71
End: 2021-06-21
Attending: FAMILY MEDICINE
Payer: MEDICARE

## 2021-06-21 VITALS
DIASTOLIC BLOOD PRESSURE: 76 MMHG | HEIGHT: 62 IN | SYSTOLIC BLOOD PRESSURE: 130 MMHG | RESPIRATION RATE: 14 BRPM | TEMPERATURE: 98 F | BODY MASS INDEX: 23.22 KG/M2 | HEART RATE: 68 BPM

## 2021-06-21 DIAGNOSIS — E55.9 VITAMIN D DEFICIENCY: ICD-10-CM

## 2021-06-21 DIAGNOSIS — E78.2 MIXED HYPERLIPIDEMIA: ICD-10-CM

## 2021-06-21 DIAGNOSIS — G47.00 INSOMNIA, UNSPECIFIED TYPE: ICD-10-CM

## 2021-06-21 DIAGNOSIS — M25.322 INSTABILITY OF LEFT ELBOW JOINT: ICD-10-CM

## 2021-06-21 DIAGNOSIS — F41.9 ANXIETY: ICD-10-CM

## 2021-06-21 DIAGNOSIS — R91.1 SOLITARY PULMONARY NODULE: ICD-10-CM

## 2021-06-21 DIAGNOSIS — G47.00 INSOMNIA, UNSPECIFIED TYPE: Primary | ICD-10-CM

## 2021-06-21 LAB
ERYTHROCYTE [DISTWIDTH] IN BLOOD BY AUTOMATED COUNT: 12.2 % (ref 11.5–14.5)
HCT VFR BLD AUTO: 39.4 % (ref 37–48.5)
HGB BLD-MCNC: 13.1 G/DL (ref 12–16)
MCH RBC QN AUTO: 31.9 PG (ref 27–31)
MCHC RBC AUTO-ENTMCNC: 33.2 G/DL (ref 32–36)
MCV RBC AUTO: 96 FL (ref 82–98)
PLATELET # BLD AUTO: 276 K/UL (ref 150–450)
PMV BLD AUTO: 9.7 FL (ref 9.2–12.9)
RBC # BLD AUTO: 4.11 M/UL (ref 4–5.4)
WBC # BLD AUTO: 6.73 K/UL (ref 3.9–12.7)

## 2021-06-21 PROCEDURE — 84443 ASSAY THYROID STIM HORMONE: CPT | Performed by: FAMILY MEDICINE

## 2021-06-21 PROCEDURE — 99999 PR PBB SHADOW E&M-EST. PATIENT-LVL V: CPT | Mod: PBBFAC,,, | Performed by: FAMILY MEDICINE

## 2021-06-21 PROCEDURE — 80061 LIPID PANEL: CPT | Performed by: FAMILY MEDICINE

## 2021-06-21 PROCEDURE — 99214 OFFICE O/P EST MOD 30 MIN: CPT | Mod: S$PBB,,, | Performed by: FAMILY MEDICINE

## 2021-06-21 PROCEDURE — 82306 VITAMIN D 25 HYDROXY: CPT | Performed by: FAMILY MEDICINE

## 2021-06-21 PROCEDURE — 36415 COLL VENOUS BLD VENIPUNCTURE: CPT | Mod: PN | Performed by: FAMILY MEDICINE

## 2021-06-21 PROCEDURE — 85027 COMPLETE CBC AUTOMATED: CPT | Performed by: FAMILY MEDICINE

## 2021-06-21 PROCEDURE — 99215 OFFICE O/P EST HI 40 MIN: CPT | Mod: PBBFAC,PN | Performed by: FAMILY MEDICINE

## 2021-06-21 PROCEDURE — 99999 PR PBB SHADOW E&M-EST. PATIENT-LVL V: ICD-10-PCS | Mod: PBBFAC,,, | Performed by: FAMILY MEDICINE

## 2021-06-21 PROCEDURE — 99214 PR OFFICE/OUTPT VISIT, EST, LEVL IV, 30-39 MIN: ICD-10-PCS | Mod: S$PBB,,, | Performed by: FAMILY MEDICINE

## 2021-06-21 PROCEDURE — 80053 COMPREHEN METABOLIC PANEL: CPT | Performed by: FAMILY MEDICINE

## 2021-06-21 RX ORDER — TRAZODONE HYDROCHLORIDE 150 MG/1
150 TABLET ORAL NIGHTLY
Qty: 30 TABLET | Refills: 6 | Status: SHIPPED | OUTPATIENT
Start: 2021-06-21 | End: 2022-09-09

## 2021-06-21 RX ORDER — ALPRAZOLAM 0.25 MG/1
0.25 TABLET ORAL NIGHTLY PRN
Qty: 30 TABLET | Refills: 2 | Status: SHIPPED | OUTPATIENT
Start: 2021-06-21 | End: 2021-11-23

## 2021-06-21 RX ORDER — BUPROPION HYDROCHLORIDE 300 MG/1
300 TABLET ORAL DAILY
Qty: 30 TABLET | Refills: 6 | Status: SHIPPED | OUTPATIENT
Start: 2021-06-21 | End: 2022-02-25 | Stop reason: SDUPTHER

## 2021-06-22 LAB
25(OH)D3+25(OH)D2 SERPL-MCNC: 83 NG/ML (ref 30–96)
ALBUMIN SERPL BCP-MCNC: 3.9 G/DL (ref 3.5–5.2)
ALP SERPL-CCNC: 76 U/L (ref 55–135)
ALT SERPL W/O P-5'-P-CCNC: 20 U/L (ref 10–44)
ANION GAP SERPL CALC-SCNC: 11 MMOL/L (ref 8–16)
AST SERPL-CCNC: 25 U/L (ref 10–40)
BILIRUB SERPL-MCNC: 0.3 MG/DL (ref 0.1–1)
BUN SERPL-MCNC: 27 MG/DL (ref 8–23)
CALCIUM SERPL-MCNC: 10 MG/DL (ref 8.7–10.5)
CHLORIDE SERPL-SCNC: 99 MMOL/L (ref 95–110)
CHOLEST SERPL-MCNC: 348 MG/DL (ref 120–199)
CHOLEST/HDLC SERPL: 3.9 {RATIO} (ref 2–5)
CO2 SERPL-SCNC: 25 MMOL/L (ref 23–29)
CREAT SERPL-MCNC: 0.8 MG/DL (ref 0.5–1.4)
EST. GFR  (AFRICAN AMERICAN): >60 ML/MIN/1.73 M^2
EST. GFR  (NON AFRICAN AMERICAN): >60 ML/MIN/1.73 M^2
GLUCOSE SERPL-MCNC: 95 MG/DL (ref 70–110)
HDLC SERPL-MCNC: 90 MG/DL (ref 40–75)
HDLC SERPL: 25.9 % (ref 20–50)
LDLC SERPL CALC-MCNC: 224 MG/DL (ref 63–159)
NONHDLC SERPL-MCNC: 258 MG/DL
POTASSIUM SERPL-SCNC: 3.8 MMOL/L (ref 3.5–5.1)
PROT SERPL-MCNC: 7.2 G/DL (ref 6–8.4)
SODIUM SERPL-SCNC: 135 MMOL/L (ref 136–145)
TRIGL SERPL-MCNC: 170 MG/DL (ref 30–150)
TSH SERPL DL<=0.005 MIU/L-ACNC: 1.11 UIU/ML (ref 0.4–4)

## 2021-06-29 ENCOUNTER — HOSPITAL ENCOUNTER (OUTPATIENT)
Dept: RADIOLOGY | Facility: HOSPITAL | Age: 71
Discharge: HOME | End: 2021-06-29
Attending: FAMILY MEDICINE
Payer: MEDICARE

## 2021-06-29 DIAGNOSIS — R91.1 SOLITARY PULMONARY NODULE: ICD-10-CM

## 2021-06-29 PROCEDURE — 71250 CT CHEST WITHOUT CONTRAST: ICD-10-PCS | Mod: 26,,, | Performed by: RADIOLOGY

## 2021-06-29 PROCEDURE — 71250 CT THORAX DX C-: CPT | Mod: 26,,, | Performed by: RADIOLOGY

## 2021-06-29 PROCEDURE — 71250 CT THORAX DX C-: CPT | Mod: TC,PO

## 2021-07-19 ENCOUNTER — TELEPHONE (OUTPATIENT)
Dept: FAMILY MEDICINE | Facility: CLINIC | Age: 71
End: 2021-07-19

## 2021-10-12 ENCOUNTER — OFFICE VISIT (OUTPATIENT)
Dept: FAMILY MEDICINE | Facility: CLINIC | Age: 71
End: 2021-10-12
Payer: MEDICARE

## 2021-10-12 VITALS
BODY MASS INDEX: 23.19 KG/M2 | SYSTOLIC BLOOD PRESSURE: 138 MMHG | TEMPERATURE: 98 F | HEART RATE: 76 BPM | DIASTOLIC BLOOD PRESSURE: 82 MMHG | RESPIRATION RATE: 14 BRPM | WEIGHT: 126 LBS | HEIGHT: 62 IN

## 2021-10-12 DIAGNOSIS — E61.1 IRON DEFICIENCY: ICD-10-CM

## 2021-10-12 DIAGNOSIS — J32.9 SINUSITIS, UNSPECIFIED CHRONICITY, UNSPECIFIED LOCATION: Primary | ICD-10-CM

## 2021-10-12 DIAGNOSIS — F51.01 PRIMARY INSOMNIA: ICD-10-CM

## 2021-10-12 DIAGNOSIS — D52.0 DIETARY FOLATE DEFICIENCY ANEMIA: ICD-10-CM

## 2021-10-12 DIAGNOSIS — E78.1 PURE HYPERTRIGLYCERIDEMIA: ICD-10-CM

## 2021-10-12 DIAGNOSIS — E55.9 VITAMIN D DEFICIENCY: ICD-10-CM

## 2021-10-12 PROCEDURE — 99214 PR OFFICE/OUTPT VISIT, EST, LEVL IV, 30-39 MIN: ICD-10-PCS | Mod: S$PBB,,, | Performed by: FAMILY MEDICINE

## 2021-10-12 PROCEDURE — 99999 PR PBB SHADOW E&M-EST. PATIENT-LVL III: ICD-10-PCS | Mod: PBBFAC,,, | Performed by: FAMILY MEDICINE

## 2021-10-12 PROCEDURE — 99214 OFFICE O/P EST MOD 30 MIN: CPT | Mod: S$PBB,,, | Performed by: FAMILY MEDICINE

## 2021-10-12 PROCEDURE — 99999 PR PBB SHADOW E&M-EST. PATIENT-LVL III: CPT | Mod: PBBFAC,,, | Performed by: FAMILY MEDICINE

## 2021-10-12 PROCEDURE — 99213 OFFICE O/P EST LOW 20 MIN: CPT | Mod: PBBFAC,PN | Performed by: FAMILY MEDICINE

## 2021-10-13 ENCOUNTER — TELEPHONE (OUTPATIENT)
Dept: FAMILY MEDICINE | Facility: CLINIC | Age: 71
End: 2021-10-13

## 2021-10-14 RX ORDER — AMOXICILLIN AND CLAVULANATE POTASSIUM 875; 125 MG/1; MG/1
1 TABLET, FILM COATED ORAL 2 TIMES DAILY
Qty: 14 TABLET | Refills: 0 | Status: SHIPPED | OUTPATIENT
Start: 2021-10-14 | End: 2022-02-25

## 2021-11-05 ENCOUNTER — TELEPHONE (OUTPATIENT)
Dept: FAMILY MEDICINE | Facility: CLINIC | Age: 71
End: 2021-11-05
Payer: MEDICARE

## 2021-11-17 ENCOUNTER — OFFICE VISIT (OUTPATIENT)
Dept: URGENT CARE | Facility: CLINIC | Age: 71
End: 2021-11-17
Payer: MEDICARE

## 2021-11-17 VITALS
TEMPERATURE: 99 F | HEART RATE: 80 BPM | HEIGHT: 62 IN | WEIGHT: 125 LBS | DIASTOLIC BLOOD PRESSURE: 77 MMHG | OXYGEN SATURATION: 97 % | SYSTOLIC BLOOD PRESSURE: 133 MMHG | RESPIRATION RATE: 15 BRPM | BODY MASS INDEX: 23 KG/M2

## 2021-11-17 DIAGNOSIS — R05.9 COUGH: Primary | ICD-10-CM

## 2021-11-17 LAB
CTP QC/QA: YES
SARS-COV-2 RDRP RESP QL NAA+PROBE: NEGATIVE

## 2021-11-17 PROCEDURE — 99213 PR OFFICE/OUTPT VISIT, EST, LEVL III, 20-29 MIN: ICD-10-PCS | Mod: S$GLB,CS,, | Performed by: PHYSICIAN ASSISTANT

## 2021-11-17 PROCEDURE — 99213 OFFICE O/P EST LOW 20 MIN: CPT | Mod: S$GLB,CS,, | Performed by: PHYSICIAN ASSISTANT

## 2021-11-17 PROCEDURE — U0002: ICD-10-PCS | Mod: QW,CR,S$GLB, | Performed by: PHYSICIAN ASSISTANT

## 2021-11-17 PROCEDURE — U0002 COVID-19 LAB TEST NON-CDC: HCPCS | Mod: QW,CR,S$GLB, | Performed by: PHYSICIAN ASSISTANT

## 2022-01-22 ENCOUNTER — NURSE TRIAGE (OUTPATIENT)
Dept: ADMINISTRATIVE | Facility: CLINIC | Age: 72
End: 2022-01-22
Payer: MEDICARE

## 2022-01-22 DIAGNOSIS — U07.1 COVID-19: Primary | ICD-10-CM

## 2022-01-22 NOTE — TELEPHONE ENCOUNTER
Patient covid+ since Thursday 1/20/2022. She c/o cough, weakness, fatigue, vomiting, and body aches. Denies CP, fever, or SOB. Per protocol, will contact the on call provider. No one is currently on call for [facility]. Will route message to PCP. Patient is interested in the monoclonal antibody infusion. Advised the patient to call back with any further questions or if symptoms worsen.        Reason for Disposition   HIGH RISK for severe COVID complications (e.g., age > 64 years, obesity with BMI > 25, pregnant, chronic lung disease or other chronic medical condition)  (Exception: Already seen by PCP and no new or worsening symptoms.)    Additional Information   Negative: SEVERE difficulty breathing (e.g., struggling for each breath, speaks in single words)   Negative: Difficult to awaken or acting confused (e.g., disoriented, slurred speech)   Negative: Bluish (or gray) lips or face now   Negative: Shock suspected (e.g., cold/pale/clammy skin, too weak to stand, low BP, rapid pulse)   Negative: Sounds like a life-threatening emergency to the triager   Negative: SEVERE or constant chest pain or pressure (Exception: mild central chest pain, present only when coughing)   Negative: MODERATE difficulty breathing (e.g., speaks in phrases, SOB even at rest, pulse 100-120)   Negative: [1] Headache AND [2] stiff neck (can't touch chin to chest)   Negative: Chest pain or pressure   Negative: Patient sounds very sick or weak to the triager   Negative: MILD difficulty breathing (e.g., minimal/no SOB at rest, SOB with walking, pulse <100)   Negative: Fever > 103 F (39.4 C)   Negative: [1] Fever > 101 F (38.3 C) AND [2] age > 60 years   Negative: [1] Fever > 100.0 F (37.8 C) AND [2] bedridden (e.g., nursing home patient, CVA, chronic illness, recovering from surgery)    Protocols used: CORONAVIRUS (COVID-19) DIAGNOSED OR [id number]

## 2022-01-24 NOTE — TELEPHONE ENCOUNTER
Infusion order signed. She will be contacted by the infusion center if she qualifies based on risk score, and if it is available.

## 2022-02-11 ENCOUNTER — PATIENT MESSAGE (OUTPATIENT)
Dept: ADMINISTRATIVE | Facility: HOSPITAL | Age: 72
End: 2022-02-11
Payer: MEDICARE

## 2022-02-11 ENCOUNTER — PATIENT OUTREACH (OUTPATIENT)
Dept: ADMINISTRATIVE | Facility: HOSPITAL | Age: 72
End: 2022-02-11
Payer: MEDICARE

## 2022-02-11 NOTE — PROGRESS NOTES
2022 Care Everywhere updates requested and reviewed.  Immunizations not reconciled; request to LINKS failed. Media reports reviewed.  Duplicate HM overrides and  orders removed.  Overdue HM topic chart audit and/or requested.  Overdue lab testing linked to upcoming lab appointments if applies.  DIS reviewed      Mammogram    Health Maintenance Due   Topic Date Due    COVID-19 Vaccine (1) Never done    TETANUS VACCINE  Never done    Shingles Vaccine (1 of 2) Never done    Pneumococcal Vaccines (Age 65+) (1 of 1 - PPSV23) Never done    Influenza Vaccine (1) Never done    Mammogram  2022

## 2022-02-24 ENCOUNTER — TELEPHONE (OUTPATIENT)
Dept: FAMILY MEDICINE | Facility: CLINIC | Age: 72
End: 2022-02-24
Payer: MEDICARE

## 2022-02-24 NOTE — TELEPHONE ENCOUNTER
----- Message from Alice Parr sent at 2/24/2022  3:45 PM CST -----  Type:  Patient Call Back    Who Called: Isela     What is the reqeust in detail: Pt is requesting an order to check vitamin d levels for lab tomorrow. Please Advise     Can the clinic reply by [facility]?    Best Call Back Number: [phone]

## 2022-02-25 ENCOUNTER — OFFICE VISIT (OUTPATIENT)
Dept: FAMILY MEDICINE | Facility: CLINIC | Age: 72
End: 2022-02-25
Payer: MEDICARE

## 2022-02-25 ENCOUNTER — LAB VISIT (OUTPATIENT)
Dept: LAB | Facility: HOSPITAL | Age: 72
End: 2022-02-25
Attending: FAMILY MEDICINE
Payer: MEDICARE

## 2022-02-25 VITALS
BODY MASS INDEX: 22.86 KG/M2 | SYSTOLIC BLOOD PRESSURE: 134 MMHG | DIASTOLIC BLOOD PRESSURE: 74 MMHG | TEMPERATURE: 99 F | HEART RATE: 68 BPM | RESPIRATION RATE: 14 BRPM | HEIGHT: 62 IN

## 2022-02-25 DIAGNOSIS — Z12.31 SCREENING MAMMOGRAM FOR HIGH-RISK PATIENT: ICD-10-CM

## 2022-02-25 DIAGNOSIS — E61.1 IRON DEFICIENCY: ICD-10-CM

## 2022-02-25 DIAGNOSIS — E78.1 PURE HYPERTRIGLYCERIDEMIA: ICD-10-CM

## 2022-02-25 DIAGNOSIS — F41.9 ANXIETY: ICD-10-CM

## 2022-02-25 DIAGNOSIS — D52.0 DIETARY FOLATE DEFICIENCY ANEMIA: ICD-10-CM

## 2022-02-25 DIAGNOSIS — N39.46 MIXED INCONTINENCE URGE AND STRESS: Primary | ICD-10-CM

## 2022-02-25 DIAGNOSIS — E55.9 VITAMIN D DEFICIENCY: ICD-10-CM

## 2022-02-25 LAB
25(OH)D3+25(OH)D2 SERPL-MCNC: 137 NG/ML (ref 30–96)
ALBUMIN SERPL BCP-MCNC: 3.9 G/DL (ref 3.5–5.2)
ALP SERPL-CCNC: 99 U/L (ref 55–135)
ALT SERPL W/O P-5'-P-CCNC: 26 U/L (ref 10–44)
ANION GAP SERPL CALC-SCNC: 9 MMOL/L (ref 8–16)
AST SERPL-CCNC: 23 U/L (ref 10–40)
BILIRUB SERPL-MCNC: 0.5 MG/DL (ref 0.1–1)
BUN SERPL-MCNC: 21 MG/DL (ref 8–23)
CALCIUM SERPL-MCNC: 10.4 MG/DL (ref 8.7–10.5)
CHLORIDE SERPL-SCNC: 101 MMOL/L (ref 95–110)
CHOLEST SERPL-MCNC: 358 MG/DL (ref 120–199)
CHOLEST/HDLC SERPL: 3.1 {RATIO} (ref 2–5)
CO2 SERPL-SCNC: 28 MMOL/L (ref 23–29)
CREAT SERPL-MCNC: 0.9 MG/DL (ref 0.5–1.4)
ERYTHROCYTE [DISTWIDTH] IN BLOOD BY AUTOMATED COUNT: 13.3 % (ref 11.5–14.5)
EST. GFR  (AFRICAN AMERICAN): >60 ML/MIN/1.73 M^2
EST. GFR  (NON AFRICAN AMERICAN): >60 ML/MIN/1.73 M^2
GLUCOSE SERPL-MCNC: 102 MG/DL (ref 70–110)
HCT VFR BLD AUTO: 42.2 % (ref 37–48.5)
HDLC SERPL-MCNC: 114 MG/DL (ref 40–75)
HDLC SERPL: 31.8 % (ref 20–50)
HGB BLD-MCNC: 13.5 G/DL (ref 12–16)
IRON SERPL-MCNC: 125 UG/DL (ref 30–160)
LDLC SERPL CALC-MCNC: 230.2 MG/DL (ref 63–159)
MCH RBC QN AUTO: 31.4 PG (ref 27–31)
MCHC RBC AUTO-ENTMCNC: 32 G/DL (ref 32–36)
MCV RBC AUTO: 98 FL (ref 82–98)
NONHDLC SERPL-MCNC: 244 MG/DL
PLATELET # BLD AUTO: 354 K/UL (ref 150–450)
PMV BLD AUTO: 9.5 FL (ref 9.2–12.9)
POTASSIUM SERPL-SCNC: 4.7 MMOL/L (ref 3.5–5.1)
PROT SERPL-MCNC: 7.4 G/DL (ref 6–8.4)
RBC # BLD AUTO: 4.3 M/UL (ref 4–5.4)
SATURATED IRON: 37 % (ref 20–50)
SODIUM SERPL-SCNC: 138 MMOL/L (ref 136–145)
TOTAL IRON BINDING CAPACITY: 340 UG/DL (ref 250–450)
TRANSFERRIN SERPL-MCNC: 230 MG/DL (ref 200–375)
TRIGL SERPL-MCNC: 69 MG/DL (ref 30–150)
TSH SERPL DL<=0.005 MIU/L-ACNC: 1.35 UIU/ML (ref 0.4–4)
VIT B12 SERPL-MCNC: 1586 PG/ML (ref 210–950)
WBC # BLD AUTO: 5.43 K/UL (ref 3.9–12.7)

## 2022-02-25 PROCEDURE — 99214 PR OFFICE/OUTPT VISIT, EST, LEVL IV, 30-39 MIN: ICD-10-PCS | Mod: S$PBB,,, | Performed by: FAMILY MEDICINE

## 2022-02-25 PROCEDURE — 84466 ASSAY OF TRANSFERRIN: CPT | Performed by: FAMILY MEDICINE

## 2022-02-25 PROCEDURE — 80061 LIPID PANEL: CPT | Performed by: FAMILY MEDICINE

## 2022-02-25 PROCEDURE — 82607 VITAMIN B-12: CPT | Performed by: FAMILY MEDICINE

## 2022-02-25 PROCEDURE — 99214 OFFICE O/P EST MOD 30 MIN: CPT | Mod: S$PBB,,, | Performed by: FAMILY MEDICINE

## 2022-02-25 PROCEDURE — 36415 COLL VENOUS BLD VENIPUNCTURE: CPT | Mod: PN | Performed by: FAMILY MEDICINE

## 2022-02-25 PROCEDURE — 84443 ASSAY THYROID STIM HORMONE: CPT | Performed by: FAMILY MEDICINE

## 2022-02-25 PROCEDURE — 99214 OFFICE O/P EST MOD 30 MIN: CPT | Mod: PBBFAC,PN | Performed by: FAMILY MEDICINE

## 2022-02-25 PROCEDURE — 80053 COMPREHEN METABOLIC PANEL: CPT | Performed by: FAMILY MEDICINE

## 2022-02-25 PROCEDURE — 85027 COMPLETE CBC AUTOMATED: CPT | Performed by: FAMILY MEDICINE

## 2022-02-25 PROCEDURE — 99999 PR PBB SHADOW E&M-EST. PATIENT-LVL IV: CPT | Mod: PBBFAC,,, | Performed by: FAMILY MEDICINE

## 2022-02-25 PROCEDURE — 82306 VITAMIN D 25 HYDROXY: CPT | Performed by: FAMILY MEDICINE

## 2022-02-25 PROCEDURE — 99999 PR PBB SHADOW E&M-EST. PATIENT-LVL IV: ICD-10-PCS | Mod: PBBFAC,,, | Performed by: FAMILY MEDICINE

## 2022-02-25 RX ORDER — ALPRAZOLAM 0.25 MG/1
0.25 TABLET ORAL NIGHTLY PRN
Qty: 30 TABLET | Refills: 3 | Status: SHIPPED | OUTPATIENT
Start: 2022-02-25 | End: 2022-10-28

## 2022-02-25 RX ORDER — BUPROPION HYDROCHLORIDE 300 MG/1
300 TABLET ORAL DAILY
Qty: 90 TABLET | Refills: 3 | Status: SHIPPED | OUTPATIENT
Start: 2022-02-25 | End: 2023-02-14 | Stop reason: SDUPTHER

## 2022-02-25 NOTE — PROGRESS NOTES
"Subjective:       Patient ID: Isela Giraldo is a 71 y.o. female.    Chief Complaint: Follow-up    HPI  Patient in clinic for f/u and review.  Doing well of late. Previous sinus sx have fully resolved.   Refills for wellbutrin and xanax due.   R hip still giving her trouble. Was in PT, but some is noticing that it feels unstable depending on some of her activities or sleep position.   Noticing that she is having to wear panty liners more consistently. Experiences both stress and urge incontinence sx. She's interested in pelvic floor PT.   No palp or flutters. She'll notice an occ twinge in the chest, but not persistent (<2/yr) and brief, not exertional.   Recalls cardiac eval with presser ~5 years ago. She recalls that she declined a statin at the time, and was told to cont fish oil.     Review of Systems:  Review of Systems   Constitutional: Negative for fatigue and fever.   HENT: Negative for congestion and sore throat.    Respiratory: Negative for cough and shortness of breath.    Gastrointestinal: Negative for diarrhea (improved when she discontinued her vitamin supplements) and nausea.   Genitourinary: Negative for dysuria and frequency.   Musculoskeletal: Positive for arthralgias (R hip issues with some activities). Negative for back pain (issues with her lower back) and gait problem.   Skin: Negative for color change and rash.   Neurological: Negative for dizziness and headaches (improved once regular sinus regimen).   Psychiatric/Behavioral: Negative for dysphoric mood and sleep disturbance (somewhat better).       Objective:     Vitals:    02/25/22 1125   BP: 134/74   BP Location: Left arm   Patient Position: Sitting   BP Method: Medium (Manual)   Pulse: 68   Resp: 14   Temp: 99.4 °F (37.4 °C)   TempSrc: Oral   Weight: Comment: pt refused   Height: 5' 2" (1.575 m)          Physical Exam  Vitals and nursing note reviewed.   Constitutional:       General: She is not in acute distress.     Appearance: " Normal appearance. She is well-developed.   HENT:      Head: Normocephalic and atraumatic.   Eyes:      General: No scleral icterus.        Right eye: No discharge.         Left eye: No discharge.      Conjunctiva/sclera: Conjunctivae normal.   Cardiovascular:      Rate and Rhythm: Normal rate and regular rhythm.   Pulmonary:      Effort: Pulmonary effort is normal. No respiratory distress.      Breath sounds: Normal breath sounds.   Abdominal:      General: There is no distension.      Palpations: Abdomen is soft.   Musculoskeletal:         General: No signs of injury.      Cervical back: Neck supple.      Right lower leg: No edema.      Left lower leg: No edema.   Lymphadenopathy:      Cervical: No cervical adenopathy.   Skin:     General: Skin is warm and dry.      Findings: No rash.   Neurological:      General: No focal deficit present.      Mental Status: She is alert and oriented to person, place, and time.   Psychiatric:         Mood and Affect: Mood normal.         Behavior: Behavior normal.           Assessment & Plan:  Mixed incontinence urge and stress  Comments:  pelvic floor PT ordered  Orders:  -     Ambulatory referral/consult to Physical/Occupational Therapy; Future; Expected date: 03/04/2022    Anxiety  Comments:  continue xanax prn  trial of generic wellbutrin  Orders:  -     ALPRAZolam (XANAX) 0.25 MG tablet; Take 1 tablet (0.25 mg total) by mouth nightly as needed for Anxiety.  Dispense: 30 tablet; Refill: 3  -     buPROPion (WELLBUTRIN XL) 300 MG 24 hr tablet; Take 1 tablet (300 mg total) by mouth once daily.  Dispense: 90 tablet; Refill: 3    Screening mammogram for high-risk patient  -     Mammo Digital Screening Bilat; Future; Expected date: 02/25/2022    Pure hypertriglyceridemia  Comments:  labs in process  pt declines statin

## 2022-03-02 ENCOUNTER — PATIENT OUTREACH (OUTPATIENT)
Dept: ADMINISTRATIVE | Facility: HOSPITAL | Age: 72
End: 2022-03-02
Payer: MEDICARE

## 2022-03-02 NOTE — PROGRESS NOTES
[facility] CMS chart audits-FLU VACCINE.      Care Everywhere and media, updates requested and reviewed.      The patient has not had a Flu vaccine for the 2021 measurement year.

## 2022-03-21 ENCOUNTER — HOSPITAL ENCOUNTER (OUTPATIENT)
Dept: RADIOLOGY | Facility: HOSPITAL | Age: 72
Discharge: HOME | End: 2022-03-21
Attending: FAMILY MEDICINE
Payer: MEDICARE

## 2022-03-21 DIAGNOSIS — Z12.31 SCREENING MAMMOGRAM FOR HIGH-RISK PATIENT: ICD-10-CM

## 2022-03-21 PROCEDURE — 77067 MAMMO DIGITAL SCREENING BILAT WITH TOMO: ICD-10-PCS | Mod: 26,,, | Performed by: RADIOLOGY

## 2022-03-21 PROCEDURE — 77063 BREAST TOMOSYNTHESIS BI: CPT | Mod: 26,,, | Performed by: RADIOLOGY

## 2022-03-21 PROCEDURE — 77067 SCR MAMMO BI INCL CAD: CPT | Mod: TC,PO

## 2022-03-21 PROCEDURE — 77063 BREAST TOMOSYNTHESIS BI: CPT | Mod: TC,PO

## 2022-03-21 PROCEDURE — 77067 SCR MAMMO BI INCL CAD: CPT | Mod: 26,,, | Performed by: RADIOLOGY

## 2022-03-21 PROCEDURE — 77063 MAMMO DIGITAL SCREENING BILAT WITH TOMO: ICD-10-PCS | Mod: 26,,, | Performed by: RADIOLOGY

## 2022-03-22 ENCOUNTER — CLINICAL SUPPORT (OUTPATIENT)
Dept: REHABILITATION | Facility: HOSPITAL | Age: 72
End: 2022-03-22
Attending: FAMILY MEDICINE
Payer: MEDICARE

## 2022-03-22 DIAGNOSIS — N39.46 MIXED INCONTINENCE URGE AND STRESS: ICD-10-CM

## 2022-03-22 DIAGNOSIS — M62.89 PELVIC FLOOR TENSION: ICD-10-CM

## 2022-03-22 DIAGNOSIS — R27.8 OTHER LACK OF COORDINATION: ICD-10-CM

## 2022-03-22 DIAGNOSIS — N81.89 PELVIC FLOOR WEAKNESS IN FEMALE: ICD-10-CM

## 2022-03-22 PROCEDURE — 97110 THERAPEUTIC EXERCISES: CPT | Mod: PN

## 2022-03-22 PROCEDURE — 97161 PT EVAL LOW COMPLEX 20 MIN: CPT | Mod: PN

## 2022-03-22 NOTE — PATIENT INSTRUCTIONS
Home Exercise Program: 03/22/2022    Kegels    Endurance Holds  Perform a long kegel (contract and LIFT the pelvic floor muscles as if you're trying to stop the stream of urine and passage of gas).    Make sure you're just using the internal muscles without holding your breath.  Let go and relax everything for 10 seconds.   Hold 5 seconds. Repeat 10 times, 3 sets per day.  (Goal is 10 seconds x10 reps)     Home Exercise Program: 03/22/2022      BLADDER HEALTH      WHAT IS CONSIDERED NORMAL?  The average bladder can hold about 2 cups of urine before it needs to be emptied.  The normal range of voiding urine is 6 to 8 times during a 24 hour period, or every 3-4 hours. As we get older, our bladder capacity can get smaller and we may need to pass urine more frequently but usually not more than every 2 hours.  Urine should flow easily without discomfort in a good, steady stream until the bladder is empty. No pushing or straining is necessary to empty the bladder.  An urge is a normal signal that you feel as the bladder stretches to fill with urine. Urges can be felt even if the bladder is not full. Urges are not commands to go to the toilet, merely a signal and can be controlled.    WHAT ARE GOOD BLADDER HABITS?  Take your time when emptying your bladder. Dont strain or push to empty your bladder. Make sure you empty your bladder completely each time you pass urine. Do not rush the process.   Dont wait too long - consistently ignoring the urge to go (waiting more than 4 hours between toileting) or urinating too infrequently may be convenient but not healthy for your bladder. You can actually overstretch your bladder beyond its normal size.   Dont go too often - avoid going to the toilet just in case (more often than every 2 hours). It is usually not necessary to go when you feel the first urge. Try to go only when your bladder is full. Dont let your bladder control your life.    DIET CAN AFFECT THE  BLADDER  Maintain a healthy fluid intake. Many people decrease their intake of liquids in hopes that they will need to urinate less frequently or have less urinary leakage. While a decrease in liquid intake does result in a decrease in the volume of urine, the smaller amount of urine may be more highly concentrated. Highly concentrated, dark yellow urine is irritating to the bladder surface and may actually cause you to go to the bathroom more frequently. It also encourages the growth of bacteria, which may lead to infections resulting in incontinence.  Depending on your body size and environment, drink 4-8 cups (8 oz each) of fluid per day, spread throughout the entire day, unless otherwise advised by your doctor.    Avoid of use the following acidic food/beverages in moderation:  Alcoholic beverages    Tomato-based products  Vinegar  Coffee (regular and decaf)  Tea (regular and decaf)  Dunn  Citrus fruits and juices  Spicy foods  Caffeinated beverages  Cola  Milk  Food colorings and flavorings  Artificial sweeteners  Chocolate    Try using these dietary substitutions:  Water: grape juice, apple juice  Fruit: pears, apricots, papaya, watermelon  Coffee: KAVA®, Postum®, Chica®, Kaffree Consuelo®  Tea: Non-citrus herbal, sun-brewed tea  Vitamin C: Calcium carbonate co-buffered with calcium ascorbate    Avoid constipation by maintaining a balanced diet of dietary fiber. Typical dietary recommendations for fiber are between 25-35 grams per day. You should discuss your fiber needs with your physician, pharmacist or nutritionist.    Stop smoking. Smoking is irritating to the bladder surface and is associated with bladder cancer. In addition, the coughing associated with smoking may lead to increased incontinent episodes.

## 2022-03-22 NOTE — PLAN OF CARE
[facility]  Pelvic Health Physical Therapy Initial Evaluation    Date: 3/22/2022   Name: Isela Giraldo  Clinic Number: [id number]  Therapy Diagnosis:   Encounter Diagnoses   Name Primary?    Mixed incontinence urge and stress     Pelvic floor weakness in female     Pelvic floor tension     Other lack of coordination      Physician: Merced Johnson MD    Physician Orders: PT Eval and Treat   Medical Diagnosis from Referral: N39.46 (ICD-10-CM) - Mixed incontinence urge and stress  Evaluation Date: 3/22/2022  Authorization Period Expiration: 2/25/23  Plan of Care Expiration: 5/20/22  Progress Note Due: 4/22/22  Visit # / Visits authorized: 1/ 1    FOTO: Issued Visit # 1    Time In: 1:17  Time Out: 2:01  Total Appointment Time (timed & untimed codes): 44 minutes    Precautions: universal    Subjective     Date of onset: Reports UI issues began after birth of 6 childrens (Now ages 33 - 47)    History of current condition - Doris reports: Had Covid and episodes of coughing - increased UI. Notices episodes of UI at night sometimes. Noticing that she is having to wear panty liners more consistently.    OB/GYN History:   6 children - all vaginal deliveries with episiotomy  Sexually active? No  Pain with vaginal exams, intercourse or tampon use? No    Bladder/Bowel History:    Frequency of urination:   Daytime: Every 2 hours (more frequent with caffeine intake)          Nighttime: 2-3   Difficulty initiating urine stream: No   Urine stream: strong   Complete emptying: Yes - sits a while    Bladder leakage: Yes   Frequency of incidents: 1x/week   Amount leaked (urine): few drops   Urinary Urgency: Yes  Able to delay the urge for at least 15 minute(s).   Frequency of bowel movements: once a day   Difficulty initiating BM: No   Quality/Shape of BM: [place] Stool Chart 3-5   Does Patient Feel Empty after BM? Yes   Fiber Supplements or Laxative Use?  No   Colon leakage: No   Form of  protection: panty liner   Number of pads required in 24 hours: 1    PAIN: Pain is not an issue in the pelvic region. Does have some R hip pain which has been addressed in the past with PT.       Medical History: Doris  has a past medical history of Anxiety, Depression, and Osteoporosis.     Surgical History:  Isela Giraldo  has a past surgical history that includes rectal fissure surgery.    Medications: Isela has a current medication list which includes the following prescription(s): albuterol, alprazolam, azelastine, bupropion, fluticasone propionate, and trazodone.    Allergies:   Review of patient's allergies indicates:   Allergen Reactions    Fosamax [alendronate]      Did not feel right    Iodine and iodide containing products     Ramelteon Other (See Comments)     Melatonen    Shrimp Hives     Hives and itching head to toe      Imaging none:     Prior Therapy/Previous treatment included: PT for hip pain  Social History:  lives alone  Current exercise:Walks and works out with  2x/week; body weight ex and kettle bell  Occupation: Retired  Prior Level of Function: Independent  Current Level of Function: see above    Types of fluid intake: coffee; water and electrolytes; occasional diet coke  Diet: Keto   Habitus:well developed, well nourished  Abuse/Neglect: tbd    Pts goals: To be able to cough and not leak; to be able to jump without leaking    OBJECTIVE     See EMR under MEDIA for written consent provided 3/22/2022  Chaperone: declined    ORTHO SCREEN  Not assessed    ABDOMINAL WALL ASSESSMENT  Palpation: WNL  Abdominal strength: Rectus abdominus: 4/5     Transverse abdominus: 4-/5  Scarring: none  Pelvic Floor Muscle and Transverse Abdominus Synergy: absent  Diastasis: absent      BREATHING MECHANICS ASSESSMENT   Thorax Assessment During Quiet Respiration: Decreased excursion of abdominal wall  and Decreased excursion bilaterally of lateral ribs   Thorax Assessment During Deep  Respiration: Decreased excursion of abdominal wall  and Decreased excursion bilaterally of lateral ribs     VAGINAL PELVIC FLOOR EXAM    EXTERNAL ASSESSMENT  Introitus: gaping  Skin condition: decreased tissue turgor  Scarring: episiotomy scar noted   Sensation: WNL   Pain:  none  Voluntary contraction: visible lift  Voluntary relaxation: visible drop  Involuntary contraction: bulge  Bearing down: bulge and prolapse  Perineal descent: absent      INTERNAL ASSESSMENT  Pain: trigger points as follows: Left bulbocavernosus and levator ani mm   Sensation: able to localized pressure appropriately   Vaginal vault: roomy   Muscle Bulk: atrophy   Muscle Power: 3+/5  Muscle Endurance: 5 sec  # Reps To Fatigue: 4    Fast Contractions in 10 seconds: nt     Quality of contraction: slow rise and incomplete relaxation   Specificity: patient contracts: glutes   Coordination: accessory muscle use   Prolapse check:Grade 2 cystocele      Limitation/Restriction for FOTO Urinary Problem Survey    Therapist reviewed FOTO scores for Isela Giraldo on 3/22/2022.   FOTO documents entered into EPIC - see Media section.    Limitation Score: 43%       TREATMENT     Treatment Time In: 1:50  Treatment Time Out: 2:01  Total Treatment time (time-based codes) separate from Evaluation: 11 minutes      Therapeutic Exercise to develop  strength and endurance for 11 minutes including: Kegels Endurance Holds    Patient Education provided:   general anatomy/physiology of urinary/ bowel  system and benefits of treatment were discussed with the pt. Additionally, bladder irritants, anatomy/physiology of pelvic floor, kegels and fluid intake/dietary modifications were reviewed.     Home Exercises Provided:  Written Home Exercises Provided: yes.  Exercises were reviewed and Doris was able to demonstrate them prior to the end of the session.    Doris demonstrated good  understanding of the education provided.     See EMR under Patient Instructions for  exercises provided 3/22/2022.    Assessment     Isela is a 71 y.o. female referred to outpatient Physical Therapy with a medical diagnosis of mixed UI. Pt presents with pelvic floor tenderness, decreased pelvic muscle strength, decreased endurance of the pelvic muscles, increased tension of the pelvic muscles, increased frequency of urination, increased nocturia, poor coordination of pelvic floor muscles during ADL's leading to urinary or fecal leakage and dysfunctional voiding.     Pt prognosis is Good.   Pt will benefit from skilled outpatient Physical Therapy to address the deficits stated above and in the chart below, provide pt/family education, and to maximize pt's level of independence.     Plan of care discussed with patient: Yes  Pt's spiritual, cultural and educational needs considered and patient is agreeable to the plan of care and goals as stated below:       Anticipated Barriers for therapy: none    Medical Necessity is demonstrated by the following    History  Co-morbidities and personal factors that may impact the plan of care Co-morbidities:   anxiety, depression and osteoporosis    Personal Factors:   age  coping style     moderate   Examination  Body Structures and Functions, activity limitations and participation restrictions that may impact the plan of care Body Regions/Systems/Functions:  pelvic floor tenderness, decreased pelvic muscle strength, decreased endurance of the pelvic muscles, increased tension of the pelvic muscles, increased frequency of urination, increased nocturia, poor coordination of pelvic floor muscles during ADL's leading to urinary or fecal leakage and dysfunctional voiding     Activity Limitations:  urgency , sleep uninterrupted by excessive nocturia, incontinence with ADLs and bathroom mapping    Participation Restrictions:  all ADLs/iADLs uninterrupted by urinary incontinence/urgency/frequency, social activities with friends/family, Sleep restrictions and exercise  restrictions due to incontinence     Activity limitations:   Learning and applying knowledge  no deficits    General Tasks and Commands  no deficits    Communication  no deficits    Mobility  lifting and carrying objects  jumping    Self care  toileting    Domestic Life  no deficits    Interactions/Relationships  no deficits    Life Areas  no deficits    Community and Social Life  recreation and leisure       moderate   Clinical Presentation stable and uncomplicated low   Decision Making/ Complexity Score: low       Goals:  Short Term Goals: 4 weeks   Pt to be edu pelvic muscle bracing and be able to consistently perform correctly and quickly to help decrease incontinence with cough/laugh/sneeze.  Pt to be able to perform a 5  second kegel x 10 reps to demonstrate improving strength and endurance needed for continence.  Pt to demonstrate being able to correctly and consistently perform a kegel which is needed  to increase pelvic floor muscle coordination and strength needed for continence.  Pt to report a decrease in urinary frequency from every 90 minutes to no more than once every 2.5 hours to improve ability to participate in social activities.  Pt to report being able to sneeze without incontinence demonstrating improved pelvic floor strength and coordination to improve confidence in social situations  Pt to voice understanding of the role that diet plays on urinary urgency.           Long Term Goals: 8 weeks  Pt to be discharged with home plan for carry over after discharge.    Pt to be able to perform a 10 second kegel x 10 reps to demonstrate improving strength and endurance needed for continence.  Pt to report a decrease in pad usage to 0 pads a day to demonstrate improving pelvic floor muscle controls as evidenced by decreased episodes of incontinence needed to improve confidence in social situations.  Pt to report no longer feeling the need to urinate just in case when shopping or participating in social  activities to demonstrate improving pelvic floor and bladder control.  Pt to report a decrease in urinary frequency from every 90 minutes to no more than once every 3 hours to improve ability to participate in social activities.  Pt to report elimination of incontinence with ADLs to demonstrate improved pelvic floor muscle strength and coordination  Pt to demonstrate an improved score in the FOTO Urinary survey  to at least 34% limitation to demonstrate improving continence.    Pt to increase pelvic floor strength to at least 4/5 to demonstrate improved strength needed for continence with ADLs.   Pt will be able to perform 20 jumping jacks without UI.      Plan     Plan of care Certification: 3/22/2022 to 5/20/22.    Outpatient Physical Therapy 1 times weekly for 8 weeks to include the following interventions: therapeutic exercises, therapeutic activity, neuromuscular re-education, manual therapy, patient/family education and dry needling    Esther Zacarias, PT

## 2022-04-05 ENCOUNTER — CLINICAL SUPPORT (OUTPATIENT)
Dept: REHABILITATION | Facility: HOSPITAL | Age: 72
End: 2022-04-05
Payer: MEDICARE

## 2022-04-05 DIAGNOSIS — R27.8 OTHER LACK OF COORDINATION: ICD-10-CM

## 2022-04-05 DIAGNOSIS — N81.89 PELVIC FLOOR WEAKNESS IN FEMALE: Primary | ICD-10-CM

## 2022-04-05 DIAGNOSIS — M62.89 PELVIC FLOOR TENSION: ICD-10-CM

## 2022-04-05 PROCEDURE — 97112 NEUROMUSCULAR REEDUCATION: CPT | Mod: PN

## 2022-04-05 PROCEDURE — 97110 THERAPEUTIC EXERCISES: CPT | Mod: PN

## 2022-04-05 NOTE — PATIENT INSTRUCTIONS
"          Deep core/lower abdominals - tighten lower abdominals like firming a rubber band across front of hips. Can feel just inside hip bones for correct contraction. Can make "shhh" sound on exhale to feel for right muscles.    CONTROLLING URINARY / FECAL URGENCY    What to do when you experience a strong urge to urinate or defecate:     FIRST  Stop activity, stand quietly or sit down. Try to stay very still to maintain control. Avoid rushing to the toilet.    SECOND Begin Quick Flicks (1 second LIFT of pelvic floor muscles, 4 second DROP). Pelvic floor contractions send a message to the bladder to relax and hold urine.     THIRD Relax. Do not rush to the toilet. Take a deep belly or diaphragmatic breath and let it out slowly. Let the urge to urinate pass by using distraction techniques and positive thoughts. Try not to think about going to the bathroom.     FINALLY If the urge returns, repeat the above steps to regain control. When you feel the urge subside, walk normally to the bathroom. You can urinate once the urge has subsided.        The urge feeling strikes!   Stop, breathe, and be still and then begin Quick Flicks     Do Not rush to the toilet.     Think positively and distract yourself.           "

## 2022-04-05 NOTE — PROGRESS NOTES
"  Pelvic Health Physical Therapy   Treatment Note     Name: Isela George [facility]  Clinic Number: [id number]    Therapy Diagnosis:   Encounter Diagnoses   Name Primary?    Pelvic floor weakness in female Yes    Pelvic floor tension     Other lack of coordination      Physician: Merced Johnson MD    Visit Date: 4/5/2022  Physician Orders: PT Eval and Treat   Medical Diagnosis from Referral: N39.46 (ICD-10-CM) - Mixed incontinence urge and stress  Evaluation Date: 3/22/2022  Authorization Period Expiration: 2/25/23  Plan of Care Expiration: 5/20/22  Progress Note Due: 4/22/22  Visit # / Visits authorized: 1/ 1     FOTO: Issued Visit # 1    Time In: 1:07  Time Out: 1:54  Total Billable Time: 47 minutes    Precautions: Standard    Subjective     Pt reports: Would like to keep internal work during PT sessions to a minimum.  States she was consistent with doing her endurance hold ex 2x/day.  Reports some urgency of urine and feces at times.  .  She was compliant with home exercise program.  Response to previous treatment: Initial eval  Functional change: none reported    Pain: not an issue    Constitutional Symptoms Review: The patient denies having any constitutional symptoms.     Objective   Pt verbally consents to intravaginal treatment today.  Signed consent form already on file.     Therapeutic Exercise to develop  strength and endurance for 25 minutes including: Kegels Endurance Holds  TA sets x10 hold 5"  Hip adduction with ball x10 hold 5"  Bridges with ball x10 hold 5"  Clams with RTB 2x10 B    Doris participated in neuromuscular re-education activities to develop Coordination, Control, Down training and Proprioception for 22 minutes including:   Diaphragmatic breathing  PFM contractions/endurance holds  TA activation  TA/PFM coordination with d breathing  Urge delay strategies    Home Exercises Provided and Patient Education Provided     Education provided:   - general anatomy/physiology of urinary/ bowel  " system and benefits of treatment were discussed with the pt. Additionally, bladder irritants, anatomy/physiology of pelvic floor, kegels and fluid intake/dietary modifications were reviewed.     Discussed progression of plan of care with patient; educated pt in activity modification; reviewed HEP with pt. Pt demonstrated and verbalized understanding of all instruction and was not provided with a handout of HEP (see Patient Instructions).      Written Home Exercises Provided: yes.  Exercises were reviewed and Doris was able to demonstrate them prior to the end of the session.  Doris demonstrated good  understanding of the education provided.     See EMR under Patient Instructions for exercises provided 4/5/2022.    Assessment     Doris returns for firs visit following initial evaluation. She has requested minimal internal work. Focus on treatment today was instruction in diaphragmatic breathing and coordination with PFM contraction/relaxation. Also instructed pt in TA activation and progressed to overflow exercises utilizing TA/PFM coordination. Pt required frequent verbal cues to coordinate breath with TA/PFM activation. We also reviewed urge delay strategies as pt reports having urgency of urine and feces at times. Pt voiced understanding of all instructions and was provided handouts.  Doris Is progressing well towards her goals.   Pt prognosis is Good.     Pt will continue to benefit from skilled outpatient physical therapy to address the deficits listed in the problem list box on initial evaluation, provide pt/family education and to maximize pt's level of independence in the home and community environment.     Pt's spiritual, cultural and educational needs considered and pt agreeable to plan of care and goals.     Anticipated barriers to physical therapy: none    Goals:  Short Term Goals: 4 weeks (progressing, not met)  Pt to be edu pelvic muscle bracing and be able to consistently perform correctly and quickly  to help decrease incontinence with cough/laugh/sneeze.  Pt to be able to perform a 5  second kegel x 10 reps to demonstrate improving strength and endurance needed for continence.  Pt to demonstrate being able to correctly and consistently perform a kegel which is needed  to increase pelvic floor muscle coordination and strength needed for continence.  Pt to report a decrease in urinary frequency from every 90 minutes to no more than once every 2.5 hours to improve ability to participate in social activities.  Pt to report being able to sneeze without incontinence demonstrating improved pelvic floor strength and coordination to improve confidence in social situations  Pt to voice understanding of the role that diet plays on urinary urgency.             Long Term Goals: 8 weeks  Pt to be discharged with home plan for carry over after discharge.    Pt to be able to perform a 10 second kegel x 10 reps to demonstrate improving strength and endurance needed for continence.  Pt to report a decrease in pad usage to 0 pads a day to demonstrate improving pelvic floor muscle controls as evidenced by decreased episodes of incontinence needed to improve confidence in social situations.  Pt to report no longer feeling the need to urinate just in case when shopping or participating in social activities to demonstrate improving pelvic floor and bladder control.  Pt to report a decrease in urinary frequency from every 90 minutes to no more than once every 3 hours to improve ability to participate in social activities.  Pt to report elimination of incontinence with ADLs to demonstrate improved pelvic floor muscle strength and coordination  Pt to demonstrate an improved score in the FOTO Urinary survey  to at least 34% limitation to demonstrate improving continence.    Pt to increase pelvic floor strength to at least 4/5 to demonstrate improved strength needed for continence with ADLs.   Pt will be able to perform 20 jumping jacks  without UI.     Plan   Plan of care Certification: 3/22/2022 to 5/20/22.     Outpatient Physical Therapy 1 times weekly for 8 weeks to include the following interventions: therapeutic exercises, therapeutic activity, neuromuscular re-education, manual therapy, patient/family education and dry needling      Esther Zacarias, PT

## 2022-04-26 ENCOUNTER — CLINICAL SUPPORT (OUTPATIENT)
Dept: REHABILITATION | Facility: HOSPITAL | Age: 72
End: 2022-04-26
Payer: MEDICARE

## 2022-04-26 DIAGNOSIS — M62.89 PELVIC FLOOR TENSION: ICD-10-CM

## 2022-04-26 DIAGNOSIS — N81.89 PELVIC FLOOR WEAKNESS IN FEMALE: Primary | ICD-10-CM

## 2022-04-26 DIAGNOSIS — R27.8 OTHER LACK OF COORDINATION: ICD-10-CM

## 2022-04-26 PROCEDURE — 97110 THERAPEUTIC EXERCISES: CPT | Mod: PN

## 2022-04-26 PROCEDURE — 97112 NEUROMUSCULAR REEDUCATION: CPT | Mod: PN

## 2022-04-26 NOTE — PROGRESS NOTES
"  Pelvic Health Physical Therapy   Treatment Note     [id number]    Therapy Diagnosis:   Encounter Diagnoses   Name Primary?    Pelvic floor weakness in female Yes    Pelvic floor tension     Other lack of coordination      Physician: Merced Johnson MD    Visit Date: 4/26/2022  Physician Orders: PT Eval and Treat   Medical Diagnosis from Referral: N39.46 (ICD-10-CM) - Mixed incontinence urge and stress  Evaluation Date: 3/22/2022  Authorization Period Expiration: 2/25/23  Plan of Care Expiration: 5/20/22  Progress Note Due: 4/22/22  Visit # / Visits authorized: 2/20 (+ eval)     FOTO: Issued Visit # 1    Time In: 1:05  Time Out: 2:00  Total Billable Time:55 minutes    Precautions: Standard    Subjective     Pt reports:   Drove 8 hours to [place] and only stopped once for restroom. Limited her fluid intake a great deal, though.   Was consistent with her exercises until she went out of town.   No problems with urinary or fecal urgency since last treatment.     Would like to keep internal work during PT sessions to a minimum.    She was  Not compliant with home exercise program.  Response to previous treatment: Initial eval  Functional change: none reported    Pain: not an issue    Constitutional Symptoms Review: The patient denies having any constitutional symptoms.     Objective   Pt verbally consents to intravaginal treatment today.  Signed consent form already on file.     Therapeutic Exercise to develop  strength and endurance for 38 minutes including: Kegels Endurance Holds  TA sets x10 hold 5"  Hip adduction with ball x10 hold 5"  +hip IR with ball squeeze supine x10 hold 5"  Bridges with ball x10 hold 5"   Clams with RTB 2x10 B  +Quadruped 360 breathing with TA sets 1x10 hold 5"  +Quadruped cat/camel x5    Doris participated in neuromuscular re-education activities to develop Coordination, Control, Down training and Proprioception for 17 minutes including: "   Diaphragmatic breathing  PFM contractions/endurance holds  TA activation  TA/PFM coordination with d breathing in quadruped  360 breathing      Home Exercises Provided and Patient Education Provided     Education provided:   - general anatomy/physiology of urinary/ bowel  system and benefits of treatment were discussed with the pt. Additionally, bladder irritants, anatomy/physiology of pelvic floor, kegels and fluid intake/dietary modifications were reviewed.     Discussed progression of plan of care with patient; educated pt in activity modification; reviewed HEP with pt. Pt demonstrated and verbalized understanding of all instruction and was not provided with a handout of HEP (see Patient Instructions).      Written Home Exercises Provided: yes.  Exercises were reviewed and Doris was able to demonstrate them prior to the end of the session.  Doris demonstrated good  understanding of the education provided.     See EMR under Patient Instructions for exercises provided 4/5/2022, 4/26/22    Assessment     Continued instruction in diaphragmatic breathing and coordination with PFM contraction/relaxation. Progressed to 360 breathing in quadruped to improve rib mobility. Pt with limited lumbar flexion and thoracic rotation so added spinal mobility stretches. Pt required fewer verbal cues today to coordinate breath with TA/PFM activation.  Pt voiced understanding of all instructions, demonstrated appropriate performance of exercise and breathing technique and was provided handouts of new exercises.  Doris Is progressing well towards her goals.   Pt prognosis is Good.     Pt will continue to benefit from skilled outpatient physical therapy to address the deficits listed in the problem list box on initial evaluation, provide pt/family education and to maximize pt's level of independence in the home and community environment.     Pt's spiritual, cultural and educational needs considered and pt agreeable to plan of care and  goals.     Anticipated barriers to physical therapy: none    Goals:  Short Term Goals: 4 weeks (progressing, not met)  Pt to be edu pelvic muscle bracing and be able to consistently perform correctly and quickly to help decrease incontinence with cough/laugh/sneeze.  Pt to be able to perform a 5  second kegel x 10 reps to demonstrate improving strength and endurance needed for continence.  Pt to demonstrate being able to correctly and consistently perform a kegel which is needed  to increase pelvic floor muscle coordination and strength needed for continence.  Pt to report a decrease in urinary frequency from every 90 minutes to no more than once every 2.5 hours to improve ability to participate in social activities.  Pt to report being able to sneeze without incontinence demonstrating improved pelvic floor strength and coordination to improve confidence in social situations  Pt to voice understanding of the role that diet plays on urinary urgency.             Long Term Goals: 8 weeks  Pt to be discharged with home plan for carry over after discharge.    Pt to be able to perform a 10 second kegel x 10 reps to demonstrate improving strength and endurance needed for continence.  Pt to report a decrease in pad usage to 0 pads a day to demonstrate improving pelvic floor muscle controls as evidenced by decreased episodes of incontinence needed to improve confidence in social situations.  Pt to report no longer feeling the need to urinate just in case when shopping or participating in social activities to demonstrate improving pelvic floor and bladder control.  Pt to report a decrease in urinary frequency from every 90 minutes to no more than once every 3 hours to improve ability to participate in social activities.  Pt to report elimination of incontinence with ADLs to demonstrate improved pelvic floor muscle strength and coordination  Pt to demonstrate an improved score in the FOTO Urinary survey  to at least 34%  limitation to demonstrate improving continence.    Pt to increase pelvic floor strength to at least 4/5 to demonstrate improved strength needed for continence with ADLs.   Pt will be able to perform 20 jumping jacks without UI.     Plan   Plan of care Certification: 3/22/2022 to 5/20/22.     Outpatient Physical Therapy 1 times weekly for 8 weeks to include the following interventions: therapeutic exercises, therapeutic activity, neuromuscular re-education, manual therapy, patient/family education and dry needling      Esther Zacarias, PT

## 2022-05-03 ENCOUNTER — CLINICAL SUPPORT (OUTPATIENT)
Dept: REHABILITATION | Facility: HOSPITAL | Age: 72
End: 2022-05-03
Payer: MEDICARE

## 2022-05-03 DIAGNOSIS — R27.8 OTHER LACK OF COORDINATION: ICD-10-CM

## 2022-05-03 DIAGNOSIS — N81.89 PELVIC FLOOR WEAKNESS IN FEMALE: Primary | ICD-10-CM

## 2022-05-03 DIAGNOSIS — M62.89 PELVIC FLOOR TENSION: ICD-10-CM

## 2022-05-03 PROCEDURE — 97110 THERAPEUTIC EXERCISES: CPT | Mod: PN

## 2022-05-03 PROCEDURE — 97112 NEUROMUSCULAR REEDUCATION: CPT | Mod: PN

## 2022-05-03 NOTE — PROGRESS NOTES
"  Pelvic Health Physical Therapy   Treatment Note     Name: Isela George [facility] [place]  Clinic Number: [id number]    Therapy Diagnosis:   Encounter Diagnoses   Name Primary?    Pelvic floor weakness in female Yes    Pelvic floor tension     Other lack of coordination      Physician: Merced Johnson MD    Visit Date: 5/3/2022  Physician Orders: PT Eval and Treat   Medical Diagnosis from Referral: N39.46 (ICD-10-CM) - Mixed incontinence urge and stress  Evaluation Date: 3/22/2022  Authorization Period Expiration: 2/25/23  Plan of Care Expiration: 5/20/22  Progress Note Due: 4/22/22  Visit # / Visits authorized: 3/20 (+ eval)     FOTO: Issued Visit # 1    Time In: 1:09  Time Out: 2:00  Total Billable Time:51 minutes    Precautions: Standard    Subjective     Pt reports:   Bladder seems to better in the daytime except having some urgency if she drinks coffee or diet Coke. Not having trouble making it to the bathroom during the day any longer. Still gets up to urinate at night but usually wakes up due to coughing and then has to void.   Has some hip soreness after doing exercises.   Would like to keep internal work during PT sessions to a minimum.    She was compliant with home exercise program.  Response to previous treatment: Initial eval  Functional change: none reported    Pain: not an issue    Constitutional Symptoms Review: The patient denies having any constitutional symptoms.     Objective   Pt verbally consents to intravaginal treatment today.  Signed consent form already on file.     Therapeutic Exercise to develop  strength and endurance for 40 minutes including: Kegels Endurance Holds  TA sets x10 hold 5"  Hip adduction with ball x10 hold 5"  Hip IR with ball squeeze supine x10 hold 5" (NP - pt reported increased hip pain)  Bridges with ball x10 hold 5"   Clams with RTB 2x10 B  +Open book rotation x10 B  Quadruped cat/camel x5    Doris participated in neuromuscular re-education activities to develop " Coordination, Control, Down training and Proprioception for 11 minutes including:   Diaphragmatic breathing  PFM contractions/endurance holds  TA activation  TA/PFM coordination with d breathing in quadruped  +Seated 360 breathing with theraband at lower ribcage for feedback 1x10        Home Exercises Provided and Patient Education Provided     Education provided:   - general anatomy/physiology of urinary/ bowel  system and benefits of treatment were discussed with the pt. Additionally, bladder irritants, anatomy/physiology of pelvic floor, kegels and fluid intake/dietary modifications were reviewed.   -360 breathing (theraband at ribs for tactile cues)    Discussed progression of plan of care with patient; educated pt in activity modification; reviewed HEP with pt. Pt demonstrated and verbalized understanding of all instruction and was provided with a handout of HEP (see Patient Instructions).      Written Home Exercises Provided: yes updated HEP  Exercises were reviewed and Doris was able to demonstrate them prior to the end of the session.  Doris demonstrated good  understanding of the education provided.     See EMR under Patient Instructions for exercises provided 4/5/2022, 4/26/22, 5/3/22    Assessment     Continued instruction in diaphragmatic breathing and coordination with PFM contraction/relaxation. Progressed to 360 breathing in sitting with band used at ribcage for tactile imput to improve rib mobility. Pt with limited lumbar flexion and thoracic rotation so added open book rotation stretch. Pt required fewer verbal cues today to coordinate breath with TA/PFM activation.  Pt voiced understanding of all instructions, demonstrated appropriate performance of exercise and breathing technique and was provided handouts of new exercises.  Doris Is progressing well towards her goals.   Pt prognosis is Good.     Pt will continue to benefit from skilled outpatient physical therapy to address the deficits listed in  the problem list box on initial evaluation, provide pt/family education and to maximize pt's level of independence in the home and community environment.     Pt's spiritual, cultural and educational needs considered and pt agreeable to plan of care and goals.     Anticipated barriers to physical therapy: none    Goals:  Short Term Goals: 4 weeks (progressing, not met)  Pt to be edu pelvic muscle bracing and be able to consistently perform correctly and quickly to help decrease incontinence with cough/laugh/sneeze.  Pt to be able to perform a 5  second kegel x 10 reps to demonstrate improving strength and endurance needed for continence.  Pt to demonstrate being able to correctly and consistently perform a kegel which is needed  to increase pelvic floor muscle coordination and strength needed for continence.  Pt to report a decrease in urinary frequency from every 90 minutes to no more than once every 2.5 hours to improve ability to participate in social activities.  Pt to report being able to sneeze without incontinence demonstrating improved pelvic floor strength and coordination to improve confidence in social situations  Pt to voice understanding of the role that diet plays on urinary urgency.             Long Term Goals: 8 weeks  Pt to be discharged with home plan for carry over after discharge.    Pt to be able to perform a 10 second kegel x 10 reps to demonstrate improving strength and endurance needed for continence.  Pt to report a decrease in pad usage to 0 pads a day to demonstrate improving pelvic floor muscle controls as evidenced by decreased episodes of incontinence needed to improve confidence in social situations.  Pt to report no longer feeling the need to urinate just in case when shopping or participating in social activities to demonstrate improving pelvic floor and bladder control.  Pt to report a decrease in urinary frequency from every 90 minutes to no more than once every 3 hours to improve  ability to participate in social activities.  Pt to report elimination of incontinence with ADLs to demonstrate improved pelvic floor muscle strength and coordination  Pt to demonstrate an improved score in the FOTO Urinary survey  to at least 34% limitation to demonstrate improving continence.    Pt to increase pelvic floor strength to at least 4/5 to demonstrate improved strength needed for continence with ADLs.   Pt will be able to perform 20 jumping jacks without UI.     Plan   Plan of care Certification: 3/22/2022 to 5/20/22.     Outpatient Physical Therapy 1 times weekly for 8 weeks to include the following interventions: therapeutic exercises, therapeutic activity, neuromuscular re-education, manual therapy, patient/family education and dry needling      Esther Zacarias, PT

## 2022-05-10 ENCOUNTER — CLINICAL SUPPORT (OUTPATIENT)
Dept: REHABILITATION | Facility: HOSPITAL | Age: 72
End: 2022-05-10
Payer: MEDICARE

## 2022-05-10 DIAGNOSIS — N81.89 PELVIC FLOOR WEAKNESS IN FEMALE: Primary | ICD-10-CM

## 2022-05-10 DIAGNOSIS — R27.8 OTHER LACK OF COORDINATION: ICD-10-CM

## 2022-05-10 DIAGNOSIS — M62.89 PELVIC FLOOR TENSION: ICD-10-CM

## 2022-05-10 PROCEDURE — 97112 NEUROMUSCULAR REEDUCATION: CPT | Mod: PN

## 2022-05-10 PROCEDURE — 97110 THERAPEUTIC EXERCISES: CPT | Mod: PN

## 2022-05-10 NOTE — PROGRESS NOTES
Pelvic Health Physical Therapy   Treatment Note     Name: Isela Giraldo  [facility] Number: [id number]    Therapy Diagnosis:   Encounter Diagnoses   Name Primary?    Pelvic floor weakness in female Yes    Pelvic floor tension     Other lack of coordination      Physician: Merced Johnson MD    Visit Date: 5/10/2022  Physician Orders: PT Eval and Treat   Medical Diagnosis from Referral: N39.46 (ICD-10-CM) - Mixed incontinence urge and stress  Evaluation Date: 3/22/2022  Authorization Period Expiration: 2/25/23  Plan of Care Expiration: 5/20/22  Progress Note Due: 5/20/22  Visit # / Visits authorized: 4/20 (+ eval)     FOTO: Issued Visit # 1    Time In: 1:07  Time Out: 1:53  Total Billable Time:46  minutes    Precautions: Standard    Subjective     Pt reports:   Bladder seems to better in the daytime except having some urgency if she drinks coffee or diet Coke. Not having trouble making it to the bathroom during the day any longer. Still gets up to urinate at night but usually wakes up due to coughing and then has to void.     · Frequency of urination:   Daytime: Every 2 hours (more frequent with caffeine intake)                                Nighttime: 1-2 (improved from 2-3)  · Difficulty initiating urine stream: No  · Urine stream: strong  · Complete emptying: Yes - sits a while   · Bladder leakage: Yes  · Frequency of incidents: less than once a week now  · Amount leaked (urine): few drops  · Urinary Urgency: Yes  Able to delay the urge for at least 15 minute(s).  · Frequency of bowel movements: once a day  · Difficulty initiating BM: No  · Quality/Shape of BM: [place] Stool Chart 3-5  · Does Patient Feel Empty after BM? Yes  · Fiber Supplements or Laxative Use?  No  · Colon leakage: No  · Form of protection: panty liner  · Number of pads required in 24 hours: 1      She was compliant with home exercise program.  Response to previous treatment: Initial eval  Functional change: none reported    Pain: not  "an issue    Constitutional Symptoms Review: The patient denies having any constitutional symptoms.     Objective   Pt verbally consents to intravaginal treatment today.  Signed consent form already on file.     INTERNAL ASSESSMENT  Pain: none reported  Sensation: able to localize pressure appropriately   Vaginal vault: roomy   Muscle Bulk: atrophy   Muscle Power: 4-/5  (improved from  3+/5)  Muscle Endurance: 5 sec  # Reps To Fatigue: 9 (Improved from 4)    Fast Contractions in 10 seconds: nt                          Quality of contraction: slow rise and incomplete relaxation   Specificity: patient contracts: glutes   Coordination: WNL  Prolapse check:Grade 2 cystocele    Therapeutic Exercise to develop  strength and endurance for 38 minutes including: Kegels Endurance Holds  TA sets x10 hold 5"  Hip adduction with ball x10 hold 5"  Bridges with ball 2x10 hold 5"   Clams with RTB x10 B  +Hip abd with RTB 2x10  +Quadruped hip extension (donkey kicks) 2x10  +Squats with 10# KB 2x10  Open book rotation x10 B (NP)    Next:  +Lateral band walk RTB x20 B  +SL dead lift  +step up row at [facility]      Doris participated in neuromuscular re-education activities to develop Coordination, Control, Down training and Proprioception for 8 minutes including:   Diaphragmatic breathing  PFM contractions/endurance holds  TA activation  TA/PFM coordination with d breathing in quadruped  Breathing sequencing with squat exercises      Home Exercises Provided and Patient Education Provided     Education provided:   - general anatomy/physiology of urinary/ bowel  system and benefits of treatment were discussed with the pt. Additionally, bladder irritants, anatomy/physiology of pelvic floor, kegels and fluid intake/dietary modifications were reviewed.   -Breathing sequencing with squat exercises    Discussed progression of plan of care with patient; educated pt in activity modification; reviewed HEP with pt. Pt demonstrated and verbalized " understanding of all instruction and was provided with a handout of HEP (see Patient Instructions).      Written Home Exercises Provided: continue prior HEP  Exercises were reviewed and Doris was able to demonstrate them prior to the end of the session.  Doris demonstrated good  understanding of the education provided.     See EMR under Patient Instructions for exercises provided 4/5/2022, 4/26/22, 5/3/22    Assessment     Pt is demonstrating improved PFM strength and endurance per reassessment today. Pt required fewer verbal cues today to coordinate breath with TA/PFM activation. No tension noted on internal assessment today and pt reports no tenderness. Progressed hip strengthening ex and reviewed proper breathing with squat exercises to decrease excess pressure through PF with valsalva  Pt voiced understanding of all instructions, demonstrated appropriate performance of exercise and breathing technique.  Doris Is progressing well towards her goals.   Pt prognosis is Good.     Pt will continue to benefit from skilled outpatient physical therapy to address the deficits listed in the problem list box on initial evaluation, provide pt/family education and to maximize pt's level of independence in the home and community environment.     Pt's spiritual, cultural and educational needs considered and pt agreeable to plan of care and goals.     Anticipated barriers to physical therapy: none    Goals:  Short Term Goals: 4 weeks (progressing, not met)  Pt to be edu pelvic muscle bracing and be able to consistently perform correctly and quickly to help decrease incontinence with cough/laugh/sneeze.  Pt to be able to perform a 5  second kegel x 10 reps to demonstrate improving strength and endurance needed for continence. (met)  Pt to demonstrate being able to correctly and consistently perform a kegel which is needed  to increase pelvic floor muscle coordination and strength needed for continence. (met)  Pt to report a  decrease in urinary frequency from every 90 minutes to no more than once every 2.5 hours to improve ability to participate in social activities.  Pt to report being able to sneeze without incontinence demonstrating improved pelvic floor strength and coordination to improve confidence in social situations  Pt to voice understanding of the role that diet plays on urinary urgency.  (met)           Long Term Goals: 8 weeks  Pt to be discharged with home plan for carry over after discharge.    Pt to be able to perform a 10 second kegel x 10 reps to demonstrate improving strength and endurance needed for continence.  Pt to report a decrease in pad usage to 0 pads a day to demonstrate improving pelvic floor muscle controls as evidenced by decreased episodes of incontinence needed to improve confidence in social situations.  Pt to report no longer feeling the need to urinate just in case when shopping or participating in social activities to demonstrate improving pelvic floor and bladder control.  Pt to report a decrease in urinary frequency from every 90 minutes to no more than once every 3 hours to improve ability to participate in social activities.  Pt to report elimination of incontinence with ADLs to demonstrate improved pelvic floor muscle strength and coordination  Pt to demonstrate an improved score in the FOTO Urinary survey  to at least 34% limitation to demonstrate improving continence.    Pt to increase pelvic floor strength to at least 4/5 to demonstrate improved strength needed for continence with ADLs.   Pt will be able to perform 20 jumping jacks without UI.     Plan   Plan of care Certification: 3/22/2022 to 5/20/22.     Outpatient Physical Therapy 1 times weekly for 8 weeks to include the following interventions: therapeutic exercises, therapeutic activity, neuromuscular re-education, manual therapy, patient/family education and dry needling      Esther Zacarias, PT

## 2022-05-24 ENCOUNTER — CLINICAL SUPPORT (OUTPATIENT)
Dept: REHABILITATION | Facility: HOSPITAL | Age: 72
End: 2022-05-24
Payer: MEDICARE

## 2022-05-24 DIAGNOSIS — M62.89 PELVIC FLOOR TENSION: ICD-10-CM

## 2022-05-24 DIAGNOSIS — R27.8 OTHER LACK OF COORDINATION: ICD-10-CM

## 2022-05-24 DIAGNOSIS — N81.89 PELVIC FLOOR WEAKNESS IN FEMALE: Primary | ICD-10-CM

## 2022-05-24 PROCEDURE — 97110 THERAPEUTIC EXERCISES: CPT | Mod: PN

## 2022-05-24 PROCEDURE — 97112 NEUROMUSCULAR REEDUCATION: CPT | Mod: PN

## 2022-05-24 NOTE — PROGRESS NOTES
"  Pelvic Health Physical Therapy   Treatment Note     Name: Isela George [facility] Number: [id number]    Therapy Diagnosis:   Encounter Diagnoses   Name Primary?    Pelvic floor weakness in female Yes    Pelvic floor tension     Other lack of coordination      Physician: Merced Johnson MD    Visit Date: 5/24/2022  Physician Orders: PT Eval and Treat   Medical Diagnosis from Referral: N39.46 (ICD-10-CM) - Mixed incontinence urge and stress  Evaluation Date: 3/22/2022  Authorization Period Expiration: 2/25/23  Plan of Care Expiration: 5/20/22  Progress Note Due: 5/20/22  Visit # / Visits authorized: 5/20 (+ eval)     FOTO: Issued Visit # 1, 5    Time In: 1:10  Time Out: 1:55  Total Billable Time:45 minutes    Precautions: Standard    Subjective     Pt reports:   Doing better with urinary leakage. Not having trouble making it to the bathroom during the day any longer. Feels she is improving.      She was compliant with home exercise program.  Response to previous treatment: Initial eval  Functional change: none reported    Pain: not an issue    Constitutional Symptoms Review: The patient denies having any constitutional symptoms.     Objective   Pt verbally consents to intravaginal treatment today.  Signed consent form already on file.       Therapeutic Exercise to develop  strength and endurance for 37  minutes including: Kegels Endurance Holds  TA sets x10 hold 5"  Hip adduction with ball x10 hold 5"  Bridges with ball 2x10 hold 5"   Clams with RTB 2x10 B  Hip abd with RTB 2x10  Quadruped hip extension (donkey kicks) 2x10  Squats with 10# KB 2x10  +Lateral band walk RTB x20' B  +SL dead lift 5# 1x10 B  Open book rotation x10 B (NP)    Next:  +step up row at       Doris participated in neuromuscular re-education activities to develop Coordination, Control, Down training and Proprioception for 8 minutes including:   TA activation  TA/PFM coordination with d breathing in quadruped  Breathing sequencing with " squat exercises      Home Exercises Provided and Patient Education Provided     Education provided:   - general anatomy/physiology of urinary/ bowel  system and benefits of treatment were discussed with the pt. Additionally, bladder irritants, anatomy/physiology of pelvic floor, kegels and fluid intake/dietary modifications were reviewed.   -Breathing sequencing with squat exercises    Discussed progression of plan of care with patient; educated pt in activity modification; reviewed HEP with pt. Pt demonstrated and verbalized understanding of all instruction and was provided with a handout of HEP (see Patient Instructions).      Written Home Exercises Provided: updated HEP  Exercises were reviewed and Doris was able to demonstrate them prior to the end of the session.  Doris demonstrated good  understanding of the education provided.     See EMR under Patient Instructions for exercises provided 4/5/2022, 4/26/22, 5/3/22, 5/24/22    Assessment   . Pt required fewer verbal cues today to coordinate breath with TA/PFM activation. . Progressed hip strengthening ex and reviewed proper breathing with single leg dead lift ex to decrease excess pressure through PF with valsalva Pt with noted balance challenge with single leg ex. Pt voiced understanding of all instructions, demonstrated appropriate performance of exercise and breathing technique.  Doris Is progressing well towards her goals.   Pt prognosis is Good.     Pt will continue to benefit from skilled outpatient physical therapy to address the deficits listed in the problem list box on initial evaluation, provide pt/family education and to maximize pt's level of independence in the home and community environment.     Pt's spiritual, cultural and educational needs considered and pt agreeable to plan of care and goals.     Anticipated barriers to physical therapy: none    Goals:  Short Term Goals: 4 weeks (progressing, not met)  Pt to be edu pelvic muscle bracing and be  able to consistently perform correctly and quickly to help decrease incontinence with cough/laugh/sneeze.  Pt to be able to perform a 5  second kegel x 10 reps to demonstrate improving strength and endurance needed for continence. (met)  Pt to demonstrate being able to correctly and consistently perform a kegel which is needed  to increase pelvic floor muscle coordination and strength needed for continence. (met)  Pt to report a decrease in urinary frequency from every 90 minutes to no more than once every 2.5 hours to improve ability to participate in social activities.  Pt to report being able to sneeze without incontinence demonstrating improved pelvic floor strength and coordination to improve confidence in social situations  Pt to voice understanding of the role that diet plays on urinary urgency.  (met)           Long Term Goals: 8 weeks  Pt to be discharged with home plan for carry over after discharge.    Pt to be able to perform a 10 second kegel x 10 reps to demonstrate improving strength and endurance needed for continence.  Pt to report a decrease in pad usage to 0 pads a day to demonstrate improving pelvic floor muscle controls as evidenced by decreased episodes of incontinence needed to improve confidence in social situations.  Pt to report no longer feeling the need to urinate just in case when shopping or participating in social activities to demonstrate improving pelvic floor and bladder control.  Pt to report a decrease in urinary frequency from every 90 minutes to no more than once every 3 hours to improve ability to participate in social activities.  Pt to report elimination of incontinence with ADLs to demonstrate improved pelvic floor muscle strength and coordination  Pt to demonstrate an improved score in the FOTO Urinary survey  to at least 34% limitation to demonstrate improving continence.  (met)  Pt to increase pelvic floor strength to at least 4/5 to demonstrate improved strength needed  for continence with ADLs.   Pt will be able to perform 20 jumping jacks without UI.     Plan   Plan of care Certification: 3/22/2022 to 5/20/22.     Outpatient Physical Therapy 1 times weekly for 8 weeks to include the following interventions: therapeutic exercises, therapeutic activity, neuromuscular re-education, manual therapy, patient/family education and dry needling      Esther Zacarias, PT

## 2022-05-31 ENCOUNTER — CLINICAL SUPPORT (OUTPATIENT)
Dept: REHABILITATION | Facility: HOSPITAL | Age: 72
End: 2022-05-31
Payer: MEDICARE

## 2022-05-31 DIAGNOSIS — R27.8 OTHER LACK OF COORDINATION: ICD-10-CM

## 2022-05-31 DIAGNOSIS — N81.89 PELVIC FLOOR WEAKNESS IN FEMALE: Primary | ICD-10-CM

## 2022-05-31 DIAGNOSIS — M62.89 PELVIC FLOOR TENSION: ICD-10-CM

## 2022-05-31 PROCEDURE — 97530 THERAPEUTIC ACTIVITIES: CPT | Mod: PN

## 2022-05-31 PROCEDURE — 97110 THERAPEUTIC EXERCISES: CPT | Mod: PN

## 2022-05-31 NOTE — PROGRESS NOTES
Pelvic Health Physical Therapy   Treatment/Updated POC Note     Name: Isela Giraldo  [facility] Number: [id number]    Therapy Diagnosis:   Encounter Diagnoses   Name Primary?    Pelvic floor weakness in female Yes    Pelvic floor tension     Other lack of coordination      Physician: Merced Johnson MD    Visit Date: 5/31/2022  Physician Orders: PT Eval and Treat   Medical Diagnosis from Referral: N39.46 (ICD-10-CM) - Mixed incontinence urge and stress  Evaluation Date: 3/22/2022  Authorization Period Expiration: 2/25/23  Plan of Care Expiration: 5/20/22  Progress Note Due: 5/20/22  Visit # / Visits authorized: 6/20 (+ eval)     FOTO: Issued Visit # 1, 5    Time In: 1:10  Time Out: 2:00  Total Billable Time:50 minutes    Precautions: Standard    Subjective     Pt reports:   Doing better with urinary leakage. Not having trouble making it to the bathroom during the day any longer. Feels she is improving.    · Frequency of urination:   Daytime: Every 2 hours (more frequent with caffeine intake)                                Nighttime: 1-2 (improved from 2-3)  · Difficulty initiating urine stream: No  · Urine stream: strong  · Complete emptying: Yes - sits a while   · Bladder leakage: Yes  · Frequency of incidents: less than once a week now  · Amount leaked (urine): few drops  · Urinary Urgency: Yes  Able to delay the urge for at least 15 minute(s).  · Frequency of bowel movements: once a day  · Difficulty initiating BM: No  · Quality/Shape of BM: [place] Stool Chart 3-5  · Does Patient Feel Empty after BM? Yes  · Fiber Supplements or Laxative Use?  No  · Colon leakage: No  · Form of protection: panty liner  · Number of pads required in 24 hours: 1      She was compliant with home exercise program.  Response to previous treatment: Initial eval  Functional change: none reported    Pain: not an issue    Constitutional Symptoms Review: The patient denies having any constitutional symptoms.     Objective   Pt  "verbally consents to intravaginal treatment today.  Signed consent form already on file.     Therapeutic Activity 10 min reassessment:  INTERNAL ASSESSMENT  Pain: none reported  Sensation: able to localize pressure appropriately   Vaginal vault: roomy   Muscle Bulk: atrophy   Muscle Power: 4-/5  (improved from  3+/5)  Muscle Endurance: 5 sec  # Reps To Fatigue: 9 (Improved from 4)    Fast Contractions in 10 seconds: nt                          Quality of contraction: slow rise and incomplete relaxation   Specificity: patient contracts: glutes   Coordination: WNL  Prolapse check:Grade 2 cystocele    Therapeutic Exercise to develop  strength and endurance for 40  minutes including: Kegels Endurance Holds  TA sets x10 hold 5"  Hip adduction with ball x10 hold 5"  Bridges with ball 2x10 hold 5"   Clams with RTB 2x10 B  Hip abd with RTB 2x10   +Side plank (modified from knees) 5x5"  Quadruped hip extension (donkey kicks) 2x10  Squats with 10# KB 2x10  +Lateral band walk RTB x20' B  +SL dead lift 0# 1x10 B (noted balance challenge)  Open book rotation x10 B (NP)    Next:  +step up row at [facility]  Add plyometrics    Doris participated in neuromuscular re-education activities to develop Coordination, Control, Down training and Proprioception for 0 minutes including:   TA activation  TA/PFM coordination with d breathing in quadruped  Breathing sequencing with squat exercises      Home Exercises Provided and Patient Education Provided     Education provided:   - general anatomy/physiology of urinary/ bowel  system and benefits of treatment were discussed with the pt. Additionally, bladder irritants, anatomy/physiology of pelvic floor, kegels and fluid intake/dietary modifications were reviewed.   -Breathing sequencing with squat exercises  -Perform tandem standing B at home daily for ballance    Discussed progression of plan of care with patient; educated pt in activity modification; reviewed HEP with pt. Pt demonstrated and " verbalized understanding of all instruction and was provided with a handout of HEP (see Patient Instructions).      Written Home Exercises Provided: updated HEP  Exercises were reviewed and Doris was able to demonstrate them prior to the end of the session.  Doris demonstrated good  understanding of the education provided.     See EMR under Patient Instructions for exercises provided 4/5/2022, 4/26/22, 5/3/22, 5/24/22, 5/31/22    Assessment   .  Pt is reporting improved urinary urgency during the day and improved nocturia. She is demonstrating increased PFM strength and endurance with pt able to perform 9 reps of 5 sec PFM contractions before muscle fatigue. She has met 4 of 6 STG and continues to work on LTG's.   Pt required fewer verbal cues today to coordinate breath with TA/PFM activation. . Progressed hip strengthening ex and reviewed proper breathing with single leg dead lift ex to decrease excess pressure through PF with valsalva Pt with noted balance challenge with single leg ex.Added tandem stand to HEP to work on balance. Pt voiced understanding of all instructions, demonstrated appropriate performance of exercise and breathing technique.  Doris Is progressing well towards her goals.   Pt prognosis is Good.     Pt will continue to benefit from skilled outpatient physical therapy to address the deficits listed in the problem list box on initial evaluation, provide pt/family education and to maximize pt's level of independence in the home and community environment.     Pt's spiritual, cultural and educational needs considered and pt agreeable to plan of care and goals.     Anticipated barriers to physical therapy: none    Goals:  Short Term Goals: 4 weeks (progressing, not met)  Pt to be edu pelvic muscle bracing and be able to consistently perform correctly and quickly to help decrease incontinence with cough/laugh/sneeze.  Pt to be able to perform a 5  second kegel x 10 reps to demonstrate improving  strength and endurance needed for continence. (met)  Pt to demonstrate being able to correctly and consistently perform a kegel which is needed  to increase pelvic floor muscle coordination and strength needed for continence. (met)  Pt to report a decrease in urinary frequency from every 90 minutes to no more than once every 2.5 hours to improve ability to participate in social activities. (met)  Pt to report being able to sneeze without incontinence demonstrating improved pelvic floor strength and coordination to improve confidence in social situations  Pt to voice understanding of the role that diet plays on urinary urgency.  (met)           Long Term Goals: 8 weeks (progressing, not met)  Pt to be discharged with home plan for carry over after discharge.    Pt to be able to perform a 10 second kegel x 10 reps to demonstrate improving strength and endurance needed for continence.  Pt to report a decrease in pad usage to 0 pads a day to demonstrate improving pelvic floor muscle controls as evidenced by decreased episodes of incontinence needed to improve confidence in social situations.  Pt to report no longer feeling the need to urinate just in case when shopping or participating in social activities to demonstrate improving pelvic floor and bladder control.  Pt to report a decrease in urinary frequency from every 90 minutes to no more than once every 3 hours to improve ability to participate in social activities.  Pt to report elimination of incontinence with ADLs to demonstrate improved pelvic floor muscle strength and coordination  Pt to demonstrate an improved score in the FOTO Urinary survey  to at least 34% limitation to demonstrate improving continence.  (met)  Pt to increase pelvic floor strength to at least 4/5 to demonstrate improved strength needed for continence with ADLs.   Pt will be able to perform 20 jumping jacks without UI.     Plan   Plan of care Certification: 5/31/2022 to  7/15/22.     Outpatient Physical Therapy 1 times weekly for 6 weeks to include the following interventions: therapeutic exercises, therapeutic activity, neuromuscular re-education, manual therapy, patient/family education and dry needling      Esther Zacarias, PT

## 2022-06-01 NOTE — PLAN OF CARE
Pelvic Health Physical Therapy   Treatment/Updated POC Note     Name: Isela Giraldo  [facility] Number: [id number]    Therapy Diagnosis:   Encounter Diagnoses   Name Primary?    Pelvic floor weakness in female Yes    Pelvic floor tension     Other lack of coordination      Physician: Merced Johnson MD    Visit Date: 5/31/2022  Physician Orders: PT Eval and Treat   Medical Diagnosis from Referral: N39.46 (ICD-10-CM) - Mixed incontinence urge and stress  Evaluation Date: 3/22/2022  Authorization Period Expiration: 2/25/23  Plan of Care Expiration: 5/20/22  Progress Note Due: 5/20/22  Visit # / Visits authorized: 6/20 (+ eval)     FOTO: Issued Visit # 1, 5    Time In: 1:10  Time Out: 2:00  Total Billable Time:50 minutes    Precautions: Standard    Subjective     Pt reports:   Doing better with urinary leakage. Not having trouble making it to the bathroom during the day any longer. Feels she is improving.    · Frequency of urination:   Daytime: Every 2 hours (more frequent with caffeine intake)                                Nighttime: 1-2 (improved from 2-3)  · Difficulty initiating urine stream: No  · Urine stream: strong  · Complete emptying: Yes - sits a while   · Bladder leakage: Yes  · Frequency of incidents: less than once a week now  · Amount leaked (urine): few drops  · Urinary Urgency: Yes  Able to delay the urge for at least 15 minute(s).  · Frequency of bowel movements: once a day  · Difficulty initiating BM: No  · Quality/Shape of BM: Russell Stool Chart 3-5  · Does Patient Feel Empty after BM? Yes  · Fiber Supplements or Laxative Use?  No  · Colon leakage: No  · Form of protection: panty liner  · Number of pads required in 24 hours: 1      She was compliant with home exercise program.  Response to previous treatment: Initial eval  Functional change: none reported    Pain: not an issue    Constitutional Symptoms Review: The patient denies having any constitutional symptoms.     Objective   Pt  "verbally consents to intravaginal treatment today.  Signed consent form already on file.     Therapeutic Activity 10 min reassessment:  INTERNAL ASSESSMENT  Pain: none reported  Sensation: able to localize pressure appropriately   Vaginal vault: roomy   Muscle Bulk: atrophy   Muscle Power: 4-/5  (improved from  3+/5)  Muscle Endurance: 5 sec  # Reps To Fatigue: 9 (Improved from 4)    Fast Contractions in 10 seconds: nt                          Quality of contraction: slow rise and incomplete relaxation   Specificity: patient contracts: glutes   Coordination: WNL  Prolapse check:Grade 2 cystocele    Therapeutic Exercise to develop  strength and endurance for 40  minutes including: Kegels Endurance Holds  TA sets x10 hold 5"  Hip adduction with ball x10 hold 5"  Bridges with ball 2x10 hold 5"   Clams with RTB 2x10 B  Hip abd with RTB 2x10   +Side plank (modified from knees) 5x5"  Quadruped hip extension (donkey kicks) 2x10  Squats with 10# KB 2x10  +Lateral band walk RTB x20' B  +SL dead lift 0# 1x10 B (noted balance challenge)  Open book rotation x10 B (NP)    Next:  +step up row at [facility]  Add plyometrics    Doris participated in neuromuscular re-education activities to develop Coordination, Control, Down training and Proprioception for 0 minutes including:   TA activation  TA/PFM coordination with d breathing in quadruped  Breathing sequencing with squat exercises      Home Exercises Provided and Patient Education Provided     Education provided:   - general anatomy/physiology of urinary/ bowel  system and benefits of treatment were discussed with the pt. Additionally, bladder irritants, anatomy/physiology of pelvic floor, kegels and fluid intake/dietary modifications were reviewed.   -Breathing sequencing with squat exercises  -Perform tandem standing B at home daily for ballance    Discussed progression of plan of care with patient; educated pt in activity modification; reviewed HEP with pt. Pt demonstrated and " verbalized understanding of all instruction and was provided with a handout of HEP (see Patient Instructions).      Written Home Exercises Provided: updated HEP  Exercises were reviewed and Doris was able to demonstrate them prior to the end of the session.  Doris demonstrated good  understanding of the education provided.     See EMR under Patient Instructions for exercises provided 4/5/2022, 4/26/22, 5/3/22, 5/24/22, 5/31/22    Assessment   .  Pt is reporting improved urinary urgency during the day and improved nocturia. She is demonstrating increased PFM strength and endurance with pt able to perform 9 reps of 5 sec PFM contractions before muscle fatigue. She has met 4 of 6 STG and continues to work on LTG's.   Pt required fewer verbal cues today to coordinate breath with TA/PFM activation. . Progressed hip strengthening ex and reviewed proper breathing with single leg dead lift ex to decrease excess pressure through PF with valsalva Pt with noted balance challenge with single leg ex.Added tandem stand to HEP to work on balance. Pt voiced understanding of all instructions, demonstrated appropriate performance of exercise and breathing technique.  Doris Is progressing well towards her goals.   Pt prognosis is Good.     Pt will continue to benefit from skilled outpatient physical therapy to address the deficits listed in the problem list box on initial evaluation, provide pt/family education and to maximize pt's level of independence in the home and community environment.     Pt's spiritual, cultural and educational needs considered and pt agreeable to plan of care and goals.     Anticipated barriers to physical therapy: none    Goals:  Short Term Goals: 4 weeks (progressing, not met)  Pt to be edu pelvic muscle bracing and be able to consistently perform correctly and quickly to help decrease incontinence with cough/laugh/sneeze.  Pt to be able to perform a 5  second kegel x 10 reps to demonstrate improving  strength and endurance needed for continence. (met)  Pt to demonstrate being able to correctly and consistently perform a kegel which is needed  to increase pelvic floor muscle coordination and strength needed for continence. (met)  Pt to report a decrease in urinary frequency from every 90 minutes to no more than once every 2.5 hours to improve ability to participate in social activities. (met)  Pt to report being able to sneeze without incontinence demonstrating improved pelvic floor strength and coordination to improve confidence in social situations  Pt to voice understanding of the role that diet plays on urinary urgency.  (met)           Long Term Goals: 8 weeks (progressing, not met)  Pt to be discharged with home plan for carry over after discharge.    Pt to be able to perform a 10 second kegel x 10 reps to demonstrate improving strength and endurance needed for continence.  Pt to report a decrease in pad usage to 0 pads a day to demonstrate improving pelvic floor muscle controls as evidenced by decreased episodes of incontinence needed to improve confidence in social situations.  Pt to report no longer feeling the need to urinate just in case when shopping or participating in social activities to demonstrate improving pelvic floor and bladder control.  Pt to report a decrease in urinary frequency from every 90 minutes to no more than once every 3 hours to improve ability to participate in social activities.  Pt to report elimination of incontinence with ADLs to demonstrate improved pelvic floor muscle strength and coordination  Pt to demonstrate an improved score in the FOTO Urinary survey  to at least 34% limitation to demonstrate improving continence.  (met)  Pt to increase pelvic floor strength to at least 4/5 to demonstrate improved strength needed for continence with ADLs.   Pt will be able to perform 20 jumping jacks without UI.     Plan   Plan of care Certification: 5/31/2022 to  7/15/22.     Outpatient Physical Therapy 1 times weekly for 6 weeks to include the following interventions: therapeutic exercises, therapeutic activity, neuromuscular re-education, manual therapy, patient/family education and dry needling      Esther Zacarias, PT

## 2022-06-08 ENCOUNTER — CLINICAL SUPPORT (OUTPATIENT)
Dept: REHABILITATION | Facility: HOSPITAL | Age: 72
End: 2022-06-08
Payer: MEDICARE

## 2022-06-08 DIAGNOSIS — N81.89 PELVIC FLOOR WEAKNESS IN FEMALE: Primary | ICD-10-CM

## 2022-06-08 DIAGNOSIS — R27.8 OTHER LACK OF COORDINATION: ICD-10-CM

## 2022-06-08 DIAGNOSIS — M62.89 PELVIC FLOOR TENSION: ICD-10-CM

## 2022-06-08 PROCEDURE — 97110 THERAPEUTIC EXERCISES: CPT | Mod: PN

## 2022-06-08 NOTE — PROGRESS NOTES
"  Pelvic Health Physical Therapy   Treatment Note     Name: Isela Giraldo  [facility] Number: [id number]    Therapy Diagnosis:   Encounter Diagnoses   Name Primary?    Pelvic floor weakness in female Yes    Pelvic floor tension     Other lack of coordination      Physician: Merced Johnson MD    Visit Date: 6/8/2022  Physician Orders: PT Eval and Treat   Medical Diagnosis from Referral: N39.46 (ICD-10-CM) - Mixed incontinence urge and stress  Evaluation Date: 3/22/2022  Authorization Period Expiration: 2/25/23  Plan of Care Expiration: 7/15/22  Progress Note Due: 6/30/22  Visit # / Visits authorized: 7/20 (+ eval)     FOTO: Issued Visit # 1, 5    Time In: 2:05  Time Out: 2:56  Total Billable Time:51 minutes    Precautions: Standard    Subjective     Pt reports:   Worked out with  Monday and did entire HEP yesterday - sore today.  Doing better with urinary leakage. Not having trouble making it to the bathroom during the day any longer.   She was compliant with home exercise program.  Response to previous treatment: Initial eval  Functional change: none reported    Pain: not an issue    Constitutional Symptoms Review: The patient denies having any constitutional symptoms.     Objective   Pt verbally consents to intravaginal treatment today.  Signed consent form already on file.     Therapeutic Exercise to develop  strength and endurance for 51 minutes including: Kegels Endurance Holds  TA sets with ball in hands and squeeze x10 hold 5"  Hip adduction with ball x10 hold 5"  Bridges with ball 2x10 hold 5"   Clams with GTB 2x10 B  Hip abd with GTB 2x10   SL dead lift 0# 1x10 B (noted balance challenge)  +Plyotoss with red ball (kickstand stance) x10 B  +Lateral weight shifts and high knees march on mini-trampoline 30"x2 ea    Not performed today:  Side plank (modified from knees) 5x5" (NP due to soreness)  Quadruped hip extension (donkey kicks) 2x10 (NP)  Squats with 10# KB 2x10  +Lateral band walk RTB x20' " B    Open book rotation x10 B (NP)    Next:  +step up row at [facility]    Doris participated in neuromuscular re-education activities to develop Coordination, Control, Down training and Proprioception for 0 minutes including:   TA activation  TA/PFM coordination with d breathing in quadruped  Breathing sequencing with squat exercises      Home Exercises Provided and Patient Education Provided     Education provided:   - general anatomy/physiology of urinary/ bowel  system and benefits of treatment were discussed with the pt. Additionally, bladder irritants, anatomy/physiology of pelvic floor, kegels and fluid intake/dietary modifications were reviewed.   -Breathing with performance of plyo ex  -Perform tandem standing B at home daily for ballance    Discussed progression of plan of care with patient; educated pt in activity modification; reviewed HEP with pt. Pt demonstrated and verbalized understanding of all instruction and was provided with a handout of HEP (see Patient Instructions).      Written Home Exercises Provided: continue prior HEP  Exercises were reviewed and Doris was able to demonstrate them prior to the end of the session.  Doris demonstrated good  understanding of the education provided.     See EMR under Patient Instructions for exercises provided 4/5/2022, 4/26/22, 5/3/22, 5/24/22, 5/31/22    Assessment   .  Pt is reporting improved urinary urgency during the day and improved nocturia.    Pt required fewer verbal cues today to coordinate breath with TA/PFM activation. . Progressed hip strengthening ex and reviewed proper breathing with single leg dead lift ex to decrease excess pressure through PF with valsalva Pt with noted balance challenge with single leg ex.Added plyometric weight shift and high knees on mini-tramp as gradual progression to loading PFM during plyo activity.  Pt voiced understanding of all instructions, demonstrated appropriate performance of exercise and breathing technique.  Doris  Is progressing well towards her goals.   Pt prognosis is Good.     Pt will continue to benefit from skilled outpatient physical therapy to address the deficits listed in the problem list box on initial evaluation, provide pt/family education and to maximize pt's level of independence in the home and community environment.     Pt's spiritual, cultural and educational needs considered and pt agreeable to plan of care and goals.     Anticipated barriers to physical therapy: none    Goals:  Short Term Goals: 4 weeks (progressing, not met)  Pt to be edu pelvic muscle bracing and be able to consistently perform correctly and quickly to help decrease incontinence with cough/laugh/sneeze. (met)  Pt to be able to perform a 5  second kegel x 10 reps to demonstrate improving strength and endurance needed for continence. (met)  Pt to demonstrate being able to correctly and consistently perform a kegel which is needed  to increase pelvic floor muscle coordination and strength needed for continence. (met)  Pt to report a decrease in urinary frequency from every 90 minutes to no more than once every 2.5 hours to improve ability to participate in social activities. (met)  Pt to report being able to sneeze without incontinence demonstrating improved pelvic floor strength and coordination to improve confidence in social situations  Pt to voice understanding of the role that diet plays on urinary urgency.  (met)           Long Term Goals: 8 weeks (progressing, not met)  Pt to be discharged with home plan for carry over after discharge.    Pt to be able to perform a 10 second kegel x 10 reps to demonstrate improving strength and endurance needed for continence.  Pt to report a decrease in pad usage to 0 pads a day to demonstrate improving pelvic floor muscle controls as evidenced by decreased episodes of incontinence needed to improve confidence in social situations.  Pt to report no longer feeling the need to urinate just in case when  shopping or participating in social activities to demonstrate improving pelvic floor and bladder control.  Pt to report a decrease in urinary frequency from every 90 minutes to no more than once every 3 hours to improve ability to participate in social activities.  Pt to report elimination of incontinence with ADLs to demonstrate improved pelvic floor muscle strength and coordination  Pt to demonstrate an improved score in the FOTO Urinary survey  to at least 34% limitation to demonstrate improving continence.  (met)  Pt to increase pelvic floor strength to at least 4/5 to demonstrate improved strength needed for continence with ADLs.   Pt will be able to perform 20 jumping jacks without UI.     Plan   Plan of care Certification: 5/31/2022 to 7/15/22.     Outpatient Physical Therapy 1 times weekly for 6 weeks to include the following interventions: therapeutic exercises, therapeutic activity, neuromuscular re-education, manual therapy, patient/family education and dry needling      Esther Zacarias, PT

## 2022-06-20 ENCOUNTER — OFFICE VISIT (OUTPATIENT)
Dept: URGENT CARE | Facility: CLINIC | Age: 72
End: 2022-06-20
Payer: MEDICARE

## 2022-06-20 VITALS
OXYGEN SATURATION: 97 % | WEIGHT: 125 LBS | DIASTOLIC BLOOD PRESSURE: 67 MMHG | RESPIRATION RATE: 18 BRPM | HEART RATE: 84 BPM | SYSTOLIC BLOOD PRESSURE: 115 MMHG | HEIGHT: 62 IN | BODY MASS INDEX: 23 KG/M2 | TEMPERATURE: 98 F

## 2022-06-20 DIAGNOSIS — S61.501A FLEXOR TENDON LACERATION OF RIGHT WRIST WITH OPEN WOUND, INITIAL ENCOUNTER: Primary | ICD-10-CM

## 2022-06-20 DIAGNOSIS — S66.921A FLEXOR TENDON LACERATION OF RIGHT WRIST WITH OPEN WOUND, INITIAL ENCOUNTER: Primary | ICD-10-CM

## 2022-06-20 PROCEDURE — 99212 PR OFFICE/OUTPT VISIT, EST, LEVL II, 10-19 MIN: ICD-10-PCS | Mod: S$GLB,,, | Performed by: FAMILY MEDICINE

## 2022-06-20 PROCEDURE — 99212 OFFICE O/P EST SF 10 MIN: CPT | Mod: S$GLB,,, | Performed by: FAMILY MEDICINE
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
## 2022-06-29 ENCOUNTER — CLINICAL SUPPORT (OUTPATIENT)
Dept: REHABILITATION | Facility: HOSPITAL | Age: 72
End: 2022-06-29
Payer: MEDICARE

## 2022-06-29 DIAGNOSIS — M62.89 PELVIC FLOOR TENSION: ICD-10-CM

## 2022-06-29 DIAGNOSIS — R27.8 OTHER LACK OF COORDINATION: ICD-10-CM

## 2022-06-29 DIAGNOSIS — N81.89 PELVIC FLOOR WEAKNESS IN FEMALE: Primary | ICD-10-CM

## 2022-06-29 PROCEDURE — 97110 THERAPEUTIC EXERCISES: CPT | Mod: PN

## 2022-06-29 PROCEDURE — 97112 NEUROMUSCULAR REEDUCATION: CPT | Mod: PN

## 2022-06-29 NOTE — PROGRESS NOTES
Pelvic Health Physical Therapy   Treatment Note     Name: Isela Giraldo  [facility] Number: [id number]    Therapy Diagnosis:   Encounter Diagnoses   Name Primary?    Pelvic floor weakness in female Yes    Pelvic floor tension     Other lack of coordination      Physician: Merced Johnson MD    Visit Date: 6/29/2022  Physician Orders: PT Eval and Treat   Medical Diagnosis from Referral: N39.46 (ICD-10-CM) - Mixed incontinence urge and stress  Evaluation Date: 3/22/2022  Authorization Period Expiration: 2/25/23  Plan of Care Expiration: 7/15/22  Progress Note Due: 6/30/22  Visit # / Visits authorized: 8/20 (+ eval)     FOTO: Issued Visit # 1, 5    Time In: 2:15 (pt 15 min late)  Time Out: 2:58  Total Billable Time:43 minutes    Precautions: Standard    Subjective     Pt reports: Cut tendon in R wrist and has stitches so can't weight bear on or lift weight with hand.   Working out with  in circuit and has been working on balance.   Doing better with urinary leakage. Not having trouble making it to the bathroom during the day any longer.     · Frequency of urination:   Daytime: Every 2 hours (more frequent with caffeine intake)                                Nighttime: 1-2 (improved from 2-3)  · Difficulty initiating urine stream: No  · Urine stream: strong  · Complete emptying: Yes - sits a while   · Bladder leakage: Yes  · Frequency of incidents: less than once a week now  · Amount leaked (urine): few drops  · Urinary Urgency: Yes  Able to delay the urge for at least 15 minute(s).  · Frequency of bowel movements: once a day  · Difficulty initiating BM: No  · Quality/Shape of BM: [place] Stool Chart 3-5  · Does Patient Feel Empty after BM? Yes  · Fiber Supplements or Laxative Use?  No  · Colon leakage: No  · Form of protection: panty liner  · Number of pads required in 24 hours: 1    She was compliant with home exercise program.  Response to previous treatment: Positive  Functional change: less UI;  "getting up less at night to void    Pain: not an issue    Constitutional Symptoms Review: The patient denies having any constitutional symptoms.     Objective   Pt verbally consents to intravaginal treatment today.  Signed consent form already on file.     Therapeutic Exercise to develop  strength and endurance for 33 minutes including: Kegels Endurance Holds    Hip adduction with ball x10 hold 5"  Bridges with ball 2x10 hold 5"   +Abdominal march level 2 2x10  Clams with GTB 2x10 B  Hip abd with GTB 2x10   Squats with GTB at knees 2x10  SL dead lift 0# 1x10 B (noted balance challenge)      +Lateral weight shifts and high knees march on mini-trampoline 30"x2 ea      Not performed today: (pt unable to bear weight on or lift weight with hand)  TA sets with ball in hands and squeeze x10 hold 5"  +Plyotoss with red ball (kickstand stance) x10 B  Side plank (modified from knees) 5x5" (NP due to soreness)  Quadruped hip extension (donkey kicks) 2x10 (NP)    Open book rotation x10 B (NP)    Next:  +step up row at     Doris participated in neuromuscular re-education activities to develop Coordination, Control, Down training and Proprioception for 10 minutes including:   TA activation  +Lateral band walk GTB x20' B  +Tandem stand on foam 30"x2 B  +Stand on foam narrow NESS with eyes closed 30" x2    TA/PFM coordination with d breathing in quadruped  Breathing sequencing with squat exercises      Home Exercises Provided and Patient Education Provided     Education provided:   - general anatomy/physiology of urinary/ bowel  system and benefits of treatment were discussed with the pt. Additionally, bladder irritants, anatomy/physiology of pelvic floor, kegels and fluid intake/dietary modifications were reviewed.   -Breathing with performance of plyo ex  -Perform tandem standing B at home daily for ballance    Discussed progression of plan of care with patient; educated pt in activity modification; reviewed HEP with pt. Pt " demonstrated and verbalized understanding of all instruction and was provided with a handout of HEP (see Patient Instructions).      Written Home Exercises Provided: continue prior HEP  Exercises were reviewed and Doris was able to demonstrate them prior to the end of the session.  Doris demonstrated good  understanding of the education provided.     See EMR under Patient Instructions for exercises provided 4/5/2022, 4/26/22, 5/3/22, 5/24/22, 5/31/22    Assessment   .  Pt is reporting improved urinary urgency during the day and improved nocturia.    Pt presents today with injury to tendon in her wrist so exercises were modified to avoid use of hand.  Pt with noted balance challenge with use of compliant foam for balance activity.  Pt voiced understanding of all instructions, demonstrated appropriate performance of exercise and breathing technique.  Doris Is progressing well towards her goals.   Pt prognosis is Good.     Pt will continue to benefit from skilled outpatient physical therapy to address the deficits listed in the problem list box on initial evaluation, provide pt/family education and to maximize pt's level of independence in the home and community environment.     Pt's spiritual, cultural and educational needs considered and pt agreeable to plan of care and goals.     Anticipated barriers to physical therapy: none    Goals:  Short Term Goals: 4 weeks (progressing, not met)  Pt to be edu pelvic muscle bracing and be able to consistently perform correctly and quickly to help decrease incontinence with cough/laugh/sneeze. (met)  Pt to be able to perform a 5  second kegel x 10 reps to demonstrate improving strength and endurance needed for continence. (met)  Pt to demonstrate being able to correctly and consistently perform a kegel which is needed  to increase pelvic floor muscle coordination and strength needed for continence. (met)  Pt to report a decrease in urinary frequency from every 90 minutes to no  more than once every 2.5 hours to improve ability to participate in social activities. (met)  Pt to report being able to sneeze without incontinence demonstrating improved pelvic floor strength and coordination to improve confidence in social situations  Pt to voice understanding of the role that diet plays on urinary urgency.  (met)           Long Term Goals: 8 weeks (progressing, not met)  Pt to be discharged with home plan for carry over after discharge.    Pt to be able to perform a 10 second kegel x 10 reps to demonstrate improving strength and endurance needed for continence.  Pt to report a decrease in pad usage to 0 pads a day to demonstrate improving pelvic floor muscle controls as evidenced by decreased episodes of incontinence needed to improve confidence in social situations.  Pt to report no longer feeling the need to urinate just in case when shopping or participating in social activities to demonstrate improving pelvic floor and bladder control.  Pt to report a decrease in urinary frequency from every 90 minutes to no more than once every 3 hours to improve ability to participate in social activities.  Pt to report elimination of incontinence with ADLs to demonstrate improved pelvic floor muscle strength and coordination  Pt to demonstrate an improved score in the FOTO Urinary survey  to at least 34% limitation to demonstrate improving continence.  (met)  Pt to increase pelvic floor strength to at least 4/5 to demonstrate improved strength needed for continence with ADLs.   Pt will be able to perform 20 jumping jacks without UI.     Plan   Plan of care Certification: 5/31/2022 to 7/15/22.     Outpatient Physical Therapy 1 times weekly for 6 weeks to include the following interventions: therapeutic exercises, therapeutic activity, neuromuscular re-education, manual therapy, patient/family education and dry needling      Esther Zacarias, PT

## 2022-06-30 ENCOUNTER — TELEPHONE (OUTPATIENT)
Dept: URGENT CARE | Facility: CLINIC | Age: 72
End: 2022-06-30
Payer: MEDICARE

## 2022-07-01 ENCOUNTER — TELEPHONE (OUTPATIENT)
Dept: URGENT CARE | Facility: CLINIC | Age: 72
End: 2022-07-01
Payer: MEDICARE

## 2022-07-07 ENCOUNTER — CLINICAL SUPPORT (OUTPATIENT)
Dept: REHABILITATION | Facility: HOSPITAL | Age: 72
End: 2022-07-07
Payer: MEDICARE

## 2022-07-07 DIAGNOSIS — N81.89 PELVIC FLOOR WEAKNESS IN FEMALE: Primary | ICD-10-CM

## 2022-07-07 DIAGNOSIS — M62.89 PELVIC FLOOR TENSION: ICD-10-CM

## 2022-07-07 DIAGNOSIS — R27.8 OTHER LACK OF COORDINATION: ICD-10-CM

## 2022-07-07 PROCEDURE — 97110 THERAPEUTIC EXERCISES: CPT | Mod: PN

## 2022-07-07 PROCEDURE — 97112 NEUROMUSCULAR REEDUCATION: CPT | Mod: PN

## 2022-07-07 NOTE — PROGRESS NOTES
Pelvic Health Physical Therapy   Treatment  Note     Name: Isela Giraldo  [facility] Number: [id number]    Therapy Diagnosis:   Encounter Diagnoses   Name Primary?    Pelvic floor weakness in female Yes    Pelvic floor tension     Other lack of coordination      Physician: Merced Johnson MD    Visit Date: 7/7/2022  Physician Orders: PT Eval and Treat   Medical Diagnosis from Referral: N39.46 (ICD-10-CM) - Mixed incontinence urge and stress  Evaluation Date: 3/22/2022  Authorization Period Expiration: 2/25/23  Plan of Care Expiration: 7/15/22  Progress Note Due: 6/30/22  Visit # / Visits authorized: 9/20 (+ eval)     FOTO: Issued Visit # 1, 5, 9    Time In: 1:16 (late for 1:00 appt)  Time Out: 2:00  Total Billable Time:44 minutes    Precautions: Standard    Subjective     Pt reports: Still has restrictions of weight bearing on wrist due to tendon injury.   Feels she is doing really well with bladder symptoms.     FOTO Urinary Problem Survey: Initial limitation 43%  7/7/22 limitation 29%      · Frequency of urination:   Daytime: Every 2 hours (more frequent with caffeine intake)                                Nighttime: 1-2 (improved from 2-3)  · Difficulty initiating urine stream: No  · Urine stream: strong  · Complete emptying: Yes - sits a while   · Bladder leakage: Yes  · Frequency of incidents: less than once a week now  · Amount leaked (urine): few drops  · Urinary Urgency: Yes  Able to delay the urge for at least 15 minute(s).  · Frequency of bowel movements: once a day  · Difficulty initiating BM: No  · Quality/Shape of BM: [place] Stool Chart 3-5  · Does Patient Feel Empty after BM? Yes  · Fiber Supplements or Laxative Use?  No  · Colon leakage: No  · Form of protection: panty liner  · Number of pads required in 24 hours: 1    She was compliant with home exercise program.  Response to previous treatment: Positive  Functional change: less UI; getting up less at night to void    Pain: not an  "issue    Constitutional Symptoms Review: The patient denies having any constitutional symptoms.     Objective   Pt verbally consents to intravaginal treatment today.  Signed consent form already on file.     Therapeutic Exercise to develop  strength and endurance for 34 minutes including: Kegels Endurance Holds    Hip adduction with ball x10 hold 5"  Bridges with ball 2x10 hold 5"   Abdominal march level 2 2x10  Clams with GTB 2x10 B  Hip abd with GTB 2x10   Squats with GTB at knees 2x10  SL dead lift 0# 1x10 B (noted balance challenge) (NP)  +Lateral weight shifts and speed skaters on mini-trampoline 30"x2 ea  +Standing hip extension with GTB (leaning on counter) 2x10 B      Not performed today: (pt unable to bear weight on or lift weight with hand)  TA sets with ball in hands and squeeze x10 hold 5"  +Plyotoss with red ball (kickstand stance) x10 B  Side plank (modified from knees) 5x5" (NP due to soreness)  Quadruped hip extension (donkey kicks) 2x10 (NP)    Open book rotation x10 B (NP)    Next:  +step up row at     Doris participated in neuromuscular re-education activities to develop Coordination, Control, Down training and Proprioception for 10 minutes including:   TA activation  Lateral band walk GTB x20' B  Tandem stand on foam 30"x2 B  Stand on foam narrow NESS with eyes closed 30" x2  +SLS on firm surface 30" ea    TA/PFM coordination with d breathing in quadruped  Breathing sequencing with squat exercises      Home Exercises Provided and Patient Education Provided     Education provided:   - general anatomy/physiology of urinary/ bowel  system and benefits of treatment were discussed with the pt. Additionally, bladder irritants, anatomy/physiology of pelvic floor, kegels and fluid intake/dietary modifications were reviewed.   -Breathing with performance of plyo ex  -Perform tandem standing B at home daily for ballance    Discussed progression of plan of care with patient; educated pt in activity " modification; reviewed HEP with pt. Pt demonstrated and verbalized understanding of all instruction and was provided with a handout of HEP (see Patient Instructions).      Written Home Exercises Provided: continue prior HEP  Exercises were reviewed and Doris was able to demonstrate them prior to the end of the session.  Doris demonstrated good  understanding of the education provided.     See EMR under Patient Instructions for exercises provided 4/5/2022, 4/26/22, 5/3/22, 5/24/22, 5/31/22    Assessment   .  Pt is reporting improved urinary urgency during the day and improved nocturia.    Pt presents today with injury to tendon in her wrist so exercises were modified to avoid use of hand.  Pt with noted balance challenge with use of compliant foam for balance activity. Continued standing core activation with lateral band walks, squats, balance challenges.   Doris Is progressing well towards her goals.   Pt prognosis is Good.     Pt will continue to benefit from skilled outpatient physical therapy to address the deficits listed in the problem list box on initial evaluation, provide pt/family education and to maximize pt's level of independence in the home and community environment.     Pt's spiritual, cultural and educational needs considered and pt agreeable to plan of care and goals.     Anticipated barriers to physical therapy: none    Goals:  Short Term Goals: 4 weeks (progressing, not met)  Pt to be edu pelvic muscle bracing and be able to consistently perform correctly and quickly to help decrease incontinence with cough/laugh/sneeze. (met)  Pt to be able to perform a 5  second kegel x 10 reps to demonstrate improving strength and endurance needed for continence. (met)  Pt to demonstrate being able to correctly and consistently perform a kegel which is needed  to increase pelvic floor muscle coordination and strength needed for continence. (met)  Pt to report a decrease in urinary frequency from every 90 minutes  to no more than once every 2.5 hours to improve ability to participate in social activities. (met)  Pt to report being able to sneeze without incontinence demonstrating improved pelvic floor strength and coordination to improve confidence in social situations  Pt to voice understanding of the role that diet plays on urinary urgency.  (met)           Long Term Goals: 8 weeks (progressing, not met)  Pt to be discharged with home plan for carry over after discharge.    Pt to be able to perform a 10 second kegel x 10 reps to demonstrate improving strength and endurance needed for continence.  Pt to report a decrease in pad usage to 0 pads a day to demonstrate improving pelvic floor muscle controls as evidenced by decreased episodes of incontinence needed to improve confidence in social situations.  Pt to report no longer feeling the need to urinate just in case when shopping or participating in social activities to demonstrate improving pelvic floor and bladder control.  Pt to report a decrease in urinary frequency from every 90 minutes to no more than once every 3 hours to improve ability to participate in social activities.  Pt to report elimination of incontinence with ADLs to demonstrate improved pelvic floor muscle strength and coordination  Pt to demonstrate an improved score in the FOTO Urinary survey  to at least 34% limitation to demonstrate improving continence.  (met)  Pt to increase pelvic floor strength to at least 4/5 to demonstrate improved strength needed for continence with ADLs.   Pt will be able to perform 20 jumping jacks without UI.     Plan   Plan of care Certification: 5/31/2022 to 7/15/22.    Update POC at next visit. Decrease frequency to every other week.      Outpatient Physical Therapy 1 times weekly for 6 weeks to include the following interventions: therapeutic exercises, therapeutic activity, neuromuscular re-education, manual therapy, patient/family education and dry  marquita Zacarias, PT

## 2022-07-28 ENCOUNTER — CLINICAL SUPPORT (OUTPATIENT)
Dept: REHABILITATION | Facility: HOSPITAL | Age: 72
End: 2022-07-28
Payer: MEDICARE

## 2022-07-28 DIAGNOSIS — M62.89 PELVIC FLOOR TENSION: ICD-10-CM

## 2022-07-28 DIAGNOSIS — N81.89 PELVIC FLOOR WEAKNESS IN FEMALE: Primary | ICD-10-CM

## 2022-07-28 DIAGNOSIS — R27.8 OTHER LACK OF COORDINATION: ICD-10-CM

## 2022-07-28 PROCEDURE — 97110 THERAPEUTIC EXERCISES: CPT | Mod: PN

## 2022-07-28 PROCEDURE — 97530 THERAPEUTIC ACTIVITIES: CPT | Mod: PN

## 2022-07-28 PROCEDURE — 97112 NEUROMUSCULAR REEDUCATION: CPT | Mod: PN

## 2022-07-28 NOTE — PROGRESS NOTES
Pelvic Health Physical Therapy   Treatment/Updated POC  Note     Name: Isela Giraldo  [facility] Number: [id number]    Therapy Diagnosis:   Encounter Diagnoses   Name Primary?    Pelvic floor weakness in female Yes    Pelvic floor tension     Other lack of coordination      Physician: Merced Johnson MD    Visit Date: 7/28/2022  Physician Orders: PT Eval and Treat   Medical Diagnosis from Referral: N39.46 (ICD-10-CM) - Mixed incontinence urge and stress  Evaluation Date: 3/22/2022  Authorization Period Expiration: 2/25/23  Plan of Care Expiration: 9/9/22  Progress Note Due: 8/28/22  Visit # / Visits authorized: 10/20 (+ eval)     FOTO: Issued Visit # 1, 5, 9    Time In: 5:02  Time Out: 5:58  Total Billable Time:56 minutes    Precautions: Standard    Subjective     Pt reports: Still has restrictions of weight bearing on wrist due to tendon injury.   Feels she is doing really well with bladder symptoms. Has not leaked in a while.  Has not been as consistent with ex - has had family visiting.     FOTO Urinary Problem Survey: Initial limitation 43%  7/7/22 limitation 29%    · Frequency of urination:   Daytime: Every 2 hours (more frequent with caffeine intake)                                Nighttime: 1 (improved from 2-3)  · Difficulty initiating urine stream: No  · Urine stream: strong  · Complete emptying: Yes - sits a while   · Bladder leakage: Rarely  · Frequency of incidents: less than once a week now  · Amount leaked (urine): few drops  · Urinary Urgency: Yes  Able to delay the urge for at least 15 minute(s).  · Frequency of bowel movements: once a day  · Difficulty initiating BM: No  · Quality/Shape of BM: [place] Stool Chart 3-5  · Does Patient Feel Empty after BM? Yes  · Fiber Supplements or Laxative Use?  No  · Colon leakage: No  · Form of protection: panty liner  · Number of pads required in 24 hours: 1    She was compliant with home exercise program.  Response to previous treatment:  "Positive  Functional change: less UI; getting up less at night to void    Pain: not an issue    Constitutional Symptoms Review: The patient denies having any constitutional symptoms.     Objective   Pt verbally consents to intravaginal treatment today.  Signed consent form already on file.     Therapeutic Activity 10 min reassessment:  INTERNAL ASSESSMENT  Pain: none reported  Sensation: able to localize pressure appropriately   Vaginal vault: roomy   Muscle Bulk: atrophy   Muscle Power: 4-/5  (improved from  3+/5)  Muscle Endurance: 5 sec  # Reps To Fatigue: 10 (Improved from 4)    Fast Contractions in 10 seconds: nt                          Quality of contraction: slow rise ; complete relaxation   Specificity: patient contracts: glutes   Coordination: WNL  Prolapse check:Grade 2 cystocele    Abdominal strength: Rectus abdominus: 4/5                                      Transverse abdominus: 4-/5       Lower Extremity Strength  Right LE  Left LE    Hip flexion: 5/5 Hip flexion: 5/5   Hip extension:  4-/5 Hip extension: 4-/5   Hip abduction: 4/5 Hip abduction: 4/5   Hip adduction: 4+/5 Hip adduction 4+/5       Therapeutic Exercise to develop  strength and endurance for 36 minutes including: Martha Endurance Holds    Hip adduction with ball x10 hold 5"  Bridges with feet on wall 2x10 hold 5"   Abdominal march level 2 2x10  Clams with GTB 2x10 B   Hip abd with GTB 2x10   Squats with GTB at knees 2x10  SL dead lift 0# 1x10 B (noted balance challenge) (NP time)  +Speed skaters x30"  Standing hip extension with GTB (leaning on counter) 2x10 B      Not performed today: (pt unable to bear weight on or lift weight with hand)  TA sets with ball in hands and squeeze x10 hold 5"  +Plyotoss with red ball (kickstand stance) x10 B  Side plank (modified from knees) 5x5" (NP due to soreness)  Quadruped hip extension (donkey kicks) 2x10 (NP)  Open book rotation x10 B (NP)    Doris participated in neuromuscular re-education " "activities to develop Coordination, Control, Down training and Proprioception for 10 minutes including:   TA activation  Lateral band walk GTB x20' B  Tandem stand on foam 30"x2 B  Stand on foam narrow NESS with eyes closed 30" x2  SLS on firm surface 30" ea    Breathing sequencing with squat exercises  TA/PF coordination with breathing for all exercises    Home Exercises Provided and Patient Education Provided     Education provided:   - general anatomy/physiology of urinary/ bowel  system and benefits of treatment were discussed with the pt. Additionally, bladder irritants, anatomy/physiology of pelvic floor, kegels and fluid intake/dietary modifications were reviewed.   -Breathing with performance of plyo ex  -Perform tandem standing B at home daily for balance    Discussed progression of plan of care with patient; educated pt in activity modification; reviewed HEP with pt. Pt demonstrated and verbalized understanding of all instruction and was provided with a handout of HEP (see Patient Instructions).      Written Home Exercises Provided: continue prior HEP  Exercises were reviewed and Doris was able to demonstrate them prior to the end of the session.  Doris demonstrated good  understanding of the education provided.     See EMR under Patient Instructions for exercises provided 4/5/2022, 4/26/22, 5/3/22, 5/24/22, 5/31/22    Assessment   .  Pt is reporting improved urinary urgency during the day and improved nocturia. UI is only happening rarely. Pt demonstrating improved strength and endurance of PFM. LE MMT reveals weakness of glutes and hip abductors.    Pt with noted balance challenge with use of compliant foam for balance activity. Continued standing core activation with lateral band walks, squats, balance challenges. Pt has met all STG's. Requires continued PT to address remaining LTG's.   Doris Is progressing well towards her goals.   Pt prognosis is Good.     Pt will continue to benefit from skilled " outpatient physical therapy to address the deficits listed in the problem list box on initial evaluation, provide pt/family education and to maximize pt's level of independence in the home and community environment.     Pt's spiritual, cultural and educational needs considered and pt agreeable to plan of care and goals.     Anticipated barriers to physical therapy: none    Goals:  Short Term Goals: 4 weeks (progressing, not met)  Pt to be edu pelvic muscle bracing and be able to consistently perform correctly and quickly to help decrease incontinence with cough/laugh/sneeze. (met)  Pt to be able to perform a 5  second kegel x 10 reps to demonstrate improving strength and endurance needed for continence. (met)  Pt to demonstrate being able to correctly and consistently perform a kegel which is needed  to increase pelvic floor muscle coordination and strength needed for continence. (met)  Pt to report a decrease in urinary frequency from every 90 minutes to no more than once every 2.5 hours to improve ability to participate in social activities. (met)  Pt to report being able to sneeze without incontinence demonstrating improved pelvic floor strength and coordination to improve confidence in social situations  Pt to voice understanding of the role that diet plays on urinary urgency.  (met)           Long Term Goals: 8 weeks (progressing, not met)  Pt to be discharged with home plan for carry over after discharge.    Pt to be able to perform a 10 second kegel x 10 reps to demonstrate improving strength and endurance needed for continence.  Pt to report a decrease in pad usage to 0 pads a day to demonstrate improving pelvic floor muscle controls as evidenced by decreased episodes of incontinence needed to improve confidence in social situations. (wearing 1/daily for security)  Pt to report no longer feeling the need to urinate just in case when shopping or participating in social activities to demonstrate improving  pelvic floor and bladder control.  Pt to report a decrease in urinary frequency from every 90 minutes to no more than once every 3 hours to improve ability to participate in social activities.  Pt to report elimination of incontinence with ADLs to demonstrate improved pelvic floor muscle strength and coordination  Pt to demonstrate an improved score in the FOTO Urinary survey  to at least 34% limitation to demonstrate improving continence.  (met)  Pt to increase pelvic floor strength to at least 4/5 to demonstrate improved strength needed for continence with ADLs. (Currently 4-/5)  Pt will be able to perform 20 jumping jacks without UI.     Plan   Plan of care Certification: 7/28/22 to 9/9/22.        Outpatient Physical Therapy 1 times weekly for 4 weeks to include the following interventions: therapeutic exercises, therapeutic activity, neuromuscular re-education, manual therapy, patient/family education and dry needling      Esther Zacarias, PT

## 2022-07-28 NOTE — PLAN OF CARE
Pelvic Health Physical Therapy   Treatment/Updated POC  Note     Name: Isela Giraldo  [facility] Number: [id number]    Therapy Diagnosis:   Encounter Diagnoses   Name Primary?    Pelvic floor weakness in female Yes    Pelvic floor tension     Other lack of coordination      Physician: Merced Johnson MD    Visit Date: 7/28/2022  Physician Orders: PT Eval and Treat   Medical Diagnosis from Referral: N39.46 (ICD-10-CM) - Mixed incontinence urge and stress  Evaluation Date: 3/22/2022  Authorization Period Expiration: 2/25/23  Plan of Care Expiration: 9/9/22  Progress Note Due: 8/28/22  Visit # / Visits authorized: 10/20 (+ eval)     FOTO: Issued Visit # 1, 5, 9    Time In: 5:02  Time Out: 5:58  Total Billable Time:56 minutes    Precautions: Standard    Subjective     Pt reports: Still has restrictions of weight bearing on wrist due to tendon injury.   Feels she is doing really well with bladder symptoms. Has not leaked in a while.  Has not been as consistent with ex - has had family visiting.     FOTO Urinary Problem Survey: Initial limitation 43%  7/7/22 limitation 29%    · Frequency of urination:   Daytime: Every 2 hours (more frequent with caffeine intake)                                Nighttime: 1 (improved from 2-3)  · Difficulty initiating urine stream: No  · Urine stream: strong  · Complete emptying: Yes - sits a while   · Bladder leakage: Rarely  · Frequency of incidents: less than once a week now  · Amount leaked (urine): few drops  · Urinary Urgency: Yes  Able to delay the urge for at least 15 minute(s).  · Frequency of bowel movements: once a day  · Difficulty initiating BM: No  · Quality/Shape of BM: [place] Stool Chart 3-5  · Does Patient Feel Empty after BM? Yes  · Fiber Supplements or Laxative Use?  No  · Colon leakage: No  · Form of protection: panty liner  · Number of pads required in 24 hours: 1    She was compliant with home exercise program.  Response to previous treatment:  "Positive  Functional change: less UI; getting up less at night to void    Pain: not an issue    Constitutional Symptoms Review: The patient denies having any constitutional symptoms.     Objective   Pt verbally consents to intravaginal treatment today.  Signed consent form already on file.     Therapeutic Activity 10 min reassessment:  INTERNAL ASSESSMENT  Pain: none reported  Sensation: able to localize pressure appropriately   Vaginal vault: roomy   Muscle Bulk: atrophy   Muscle Power: 4-/5  (improved from  3+/5)  Muscle Endurance: 5 sec  # Reps To Fatigue: 10 (Improved from 4)    Fast Contractions in 10 seconds: nt                          Quality of contraction: slow rise ; complete relaxation   Specificity: patient contracts: glutes   Coordination: WNL  Prolapse check:Grade 2 cystocele    Abdominal strength: Rectus abdominus: 4/5                                      Transverse abdominus: 4-/5       Lower Extremity Strength  Right LE  Left LE    Hip flexion: 5/5 Hip flexion: 5/5   Hip extension:  4-/5 Hip extension: 4-/5   Hip abduction: 4/5 Hip abduction: 4/5   Hip adduction: 4+/5 Hip adduction 4+/5       Therapeutic Exercise to develop  strength and endurance for 36 minutes including: Martha Endurance Holds    Hip adduction with ball x10 hold 5"  Bridges with feet on wall 2x10 hold 5"   Abdominal march level 2 2x10  Clams with GTB 2x10 B   Hip abd with GTB 2x10   Squats with GTB at knees 2x10  SL dead lift 0# 1x10 B (noted balance challenge) (NP time)  +Speed skaters x30"  Standing hip extension with GTB (leaning on counter) 2x10 B      Not performed today: (pt unable to bear weight on or lift weight with hand)  TA sets with ball in hands and squeeze x10 hold 5"  +Plyotoss with red ball (kickstand stance) x10 B  Side plank (modified from knees) 5x5" (NP due to soreness)  Quadruped hip extension (donkey kicks) 2x10 (NP)  Open book rotation x10 B (NP)    Doris participated in neuromuscular re-education " "activities to develop Coordination, Control, Down training and Proprioception for 10 minutes including:   TA activation  Lateral band walk GTB x20' B  Tandem stand on foam 30"x2 B  Stand on foam narrow NESS with eyes closed 30" x2  SLS on firm surface 30" ea    Breathing sequencing with squat exercises  TA/PF coordination with breathing for all exercises    Home Exercises Provided and Patient Education Provided     Education provided:   - general anatomy/physiology of urinary/ bowel  system and benefits of treatment were discussed with the pt. Additionally, bladder irritants, anatomy/physiology of pelvic floor, kegels and fluid intake/dietary modifications were reviewed.   -Breathing with performance of plyo ex  -Perform tandem standing B at home daily for balance    Discussed progression of plan of care with patient; educated pt in activity modification; reviewed HEP with pt. Pt demonstrated and verbalized understanding of all instruction and was provided with a handout of HEP (see Patient Instructions).      Written Home Exercises Provided: continue prior HEP  Exercises were reviewed and Doris was able to demonstrate them prior to the end of the session.  Doris demonstrated good  understanding of the education provided.     See EMR under Patient Instructions for exercises provided 4/5/2022, 4/26/22, 5/3/22, 5/24/22, 5/31/22    Assessment   .  Pt is reporting improved urinary urgency during the day and improved nocturia. UI is only happening rarely. Pt demonstrating improved strength and endurance of PFM. LE MMT reveals weakness of glutes and hip abductors.    Pt with noted balance challenge with use of compliant foam for balance activity. Continued standing core activation with lateral band walks, squats, balance challenges. Pt has met all STG's. Requires continued PT to address remaining LTG's.   Doris Is progressing well towards her goals.   Pt prognosis is Good.     Pt will continue to benefit from skilled " outpatient physical therapy to address the deficits listed in the problem list box on initial evaluation, provide pt/family education and to maximize pt's level of independence in the home and community environment.     Pt's spiritual, cultural and educational needs considered and pt agreeable to plan of care and goals.     Anticipated barriers to physical therapy: none    Goals:  Short Term Goals: 4 weeks (progressing, not met)  Pt to be edu pelvic muscle bracing and be able to consistently perform correctly and quickly to help decrease incontinence with cough/laugh/sneeze. (met)  Pt to be able to perform a 5  second kegel x 10 reps to demonstrate improving strength and endurance needed for continence. (met)  Pt to demonstrate being able to correctly and consistently perform a kegel which is needed  to increase pelvic floor muscle coordination and strength needed for continence. (met)  Pt to report a decrease in urinary frequency from every 90 minutes to no more than once every 2.5 hours to improve ability to participate in social activities. (met)  Pt to report being able to sneeze without incontinence demonstrating improved pelvic floor strength and coordination to improve confidence in social situations  Pt to voice understanding of the role that diet plays on urinary urgency.  (met)           Long Term Goals: 8 weeks (progressing, not met)  Pt to be discharged with home plan for carry over after discharge.    Pt to be able to perform a 10 second kegel x 10 reps to demonstrate improving strength and endurance needed for continence.  Pt to report a decrease in pad usage to 0 pads a day to demonstrate improving pelvic floor muscle controls as evidenced by decreased episodes of incontinence needed to improve confidence in social situations. (wearing 1/daily for security)  Pt to report no longer feeling the need to urinate just in case when shopping or participating in social activities to demonstrate improving  pelvic floor and bladder control.  Pt to report a decrease in urinary frequency from every 90 minutes to no more than once every 3 hours to improve ability to participate in social activities.  Pt to report elimination of incontinence with ADLs to demonstrate improved pelvic floor muscle strength and coordination  Pt to demonstrate an improved score in the FOTO Urinary survey  to at least 34% limitation to demonstrate improving continence.  (met)  Pt to increase pelvic floor strength to at least 4/5 to demonstrate improved strength needed for continence with ADLs. (Currently 4-/5)  Pt will be able to perform 20 jumping jacks without UI.     Plan   Plan of care Certification: 7/28/22 to 9/9/22.        Outpatient Physical Therapy 1 times weekly for 4 weeks to include the following interventions: therapeutic exercises, therapeutic activity, neuromuscular re-education, manual therapy, patient/family education and dry needling      Esther Zacarias, PT

## 2022-08-09 ENCOUNTER — CLINICAL SUPPORT (OUTPATIENT)
Dept: REHABILITATION | Facility: HOSPITAL | Age: 72
End: 2022-08-09
Payer: MEDICARE

## 2022-08-09 DIAGNOSIS — R27.8 OTHER LACK OF COORDINATION: ICD-10-CM

## 2022-08-09 DIAGNOSIS — N81.89 PELVIC FLOOR WEAKNESS IN FEMALE: Primary | ICD-10-CM

## 2022-08-09 DIAGNOSIS — M62.89 PELVIC FLOOR TENSION: ICD-10-CM

## 2022-08-09 PROCEDURE — 97140 MANUAL THERAPY 1/> REGIONS: CPT | Mod: PN

## 2022-08-09 PROCEDURE — 97112 NEUROMUSCULAR REEDUCATION: CPT | Mod: PN

## 2022-08-09 PROCEDURE — 97110 THERAPEUTIC EXERCISES: CPT | Mod: PN

## 2022-08-09 NOTE — PROGRESS NOTES
"  Pelvic Health Physical Therapy   Treatment Note     Name: Isela Giraldo  Clinic Number: [id number]    Therapy Diagnosis:   Encounter Diagnoses   Name Primary?    Pelvic floor weakness in female Yes    Pelvic floor tension     Other lack of coordination      Physician: Merced Johnson MD    Visit Date: 8/9/2022  Physician Orders: PT Eval and Treat   Medical Diagnosis from Referral: N39.46 (ICD-10-CM) - Mixed incontinence urge and stress  Evaluation Date: 3/22/2022  Authorization Period Expiration: 2/25/23  Plan of Care Expiration: 9/9/22  Progress Note Due: 8/28/22  Visit # / Visits authorized: 11/20 (+ eval)     FOTO: Issued Visit # 1, 5, 9    Time In: 2:14  Time Out: 3:08  Total Billable Time:54 minutes    Precautions: Standard    Subjective     Pt reports: Able to do more with R wrist now but can't fully weight bear on it. .   Feels she is doing really well with bladder symptoms. Has not leaked in a while.  Has been doing her exercises. Did band exercises yesterday.      FOTO Urinary Problem Survey: Initial limitation 43%  7/7/22 limitation 29%    She was compliant with home exercise program.  Response to previous treatment: Positive  Functional change: less UI; getting up less at night to void    Pain: not an issue    Constitutional Symptoms Review: The patient denies having any constitutional symptoms.     Objective   Pt verbally consents to intravaginal treatment today.  Signed consent form already on file.   Abdominal strength: Rectus abdominus: 4/5                                      Transverse abdominus: 4-/5       Lower Extremity Strength  Right LE  Left LE    Hip flexion: 5/5 Hip flexion: 5/5   Hip extension:  4-/5 Hip extension: 4-/5   Hip abduction: 4/5 Hip abduction: 4/5   Hip adduction: 4+/5 Hip adduction 4+/5       Therapeutic Exercise to develop  strength and endurance for 22 minutes including:    Hip adduction with ball x10 hold 5"  Bridges with feet on wall 2x10 hold 5"   Abdominal " "march level 2 2x10  +Isometric abdominals with small ball at knees (supine/table top position) x10  Clams with GTB 2x10 B   Hip abd with GTB 2x10   Open book rotation x10 B   Standing hip extension with GTB (leaning on counter) 2x10 B  +Hip flexor Ulisses stretch 2x30" B    Not performed today:   Squats with GTB at knees 2x10  SL dead lift 0# 1x10 B (noted balance challenge)   +Speed skaters x30"  TA sets with ball in hands and squeeze x10 hold 5"  +Plyotoss with red ball (kickstand stance) x10 B  Side plank (modified from knees) 5x5" (NP due to soreness)  Quadruped hip extension (donkey kicks) 2x10 (NP)      Doris participated in neuromuscular re-education activities to develop Coordination, Control, Down training and Proprioception for 24 minutes including:   TA activation  Lateral band walk GTB x20' B  Tandem stand on foam 30"x2 B  Stand on foam narrow NESS with eyes closed 30" x2  SLS on firm surface 30" ea  +Standing row with knee lift - blue tubing 2x10 (single arm)    TA/PF coordination with breathing for all exercises    Manual Therapy 8 min to improve muscle extensibility/flexibility:  TP release of bilateral psoas mm and ITB with trigger point gun.     Home Exercises Provided and Patient Education Provided     Education provided:   - general anatomy/physiology of urinary/ bowel  system and benefits of treatment were discussed with the pt. Additionally, bladder irritants, anatomy/physiology of pelvic floor, kegels and fluid intake/dietary modifications were reviewed.   -Breathing with performance of plyo ex  -Perform tandem standing B at home daily for balance    Discussed progression of plan of care with patient; educated pt in activity modification; reviewed HEP with pt. Pt demonstrated and verbalized understanding of all instruction and was provided with a handout of HEP (see Patient Instructions).      Written Home Exercises Provided: updated HEP  Exercises were reviewed and Doris was able to demonstrate " them prior to the end of the session.  Doris demonstrated good  understanding of the education provided.     See EMR under Patient Instructions for exercises provided 4/5/2022, 4/26/22, 5/3/22, 5/24/22, 5/31/22, 8/9/22    Assessment   .  Pt is reporting improved urinary urgency during the day and improved nocturia. UI is only happening rarely. Pt demonstrating improved strength and endurance of PFM. LE MMT reveals weakness of glutes and hip abductors.    Pt with noted balance challenge with use of compliant foam for balance activity. Continued standing core activation with addition of unilateral row with opposite knee lift for balance challenge and core strengthening. . Pt has met all STG's. Requires continued PT to address remaining LTG's.   Doris Is progressing well towards her goals.   Pt prognosis is Good.     Pt will continue to benefit from skilled outpatient physical therapy to address the deficits listed in the problem list box on initial evaluation, provide pt/family education and to maximize pt's level of independence in the home and community environment.     Pt's spiritual, cultural and educational needs considered and pt agreeable to plan of care and goals.     Anticipated barriers to physical therapy: none    Goals:  Short Term Goals: 4 weeks (progressing, not met)  Pt to be edu pelvic muscle bracing and be able to consistently perform correctly and quickly to help decrease incontinence with cough/laugh/sneeze. (met)  Pt to be able to perform a 5  second kegel x 10 reps to demonstrate improving strength and endurance needed for continence. (met)  Pt to demonstrate being able to correctly and consistently perform a kegel which is needed  to increase pelvic floor muscle coordination and strength needed for continence. (met)  Pt to report a decrease in urinary frequency from every 90 minutes to no more than once every 2.5 hours to improve ability to participate in social activities. (met)  Pt to report  being able to sneeze without incontinence demonstrating improved pelvic floor strength and coordination to improve confidence in social situations  Pt to voice understanding of the role that diet plays on urinary urgency.  (met)           Long Term Goals: 8 weeks (progressing, not met)  Pt to be discharged with home plan for carry over after discharge.    Pt to be able to perform a 10 second kegel x 10 reps to demonstrate improving strength and endurance needed for continence.  Pt to report a decrease in pad usage to 0 pads a day to demonstrate improving pelvic floor muscle controls as evidenced by decreased episodes of incontinence needed to improve confidence in social situations. (wearing 1/daily for security)  Pt to report no longer feeling the need to urinate just in case when shopping or participating in social activities to demonstrate improving pelvic floor and bladder control.  Pt to report a decrease in urinary frequency from every 90 minutes to no more than once every 3 hours to improve ability to participate in social activities.  Pt to report elimination of incontinence with ADLs to demonstrate improved pelvic floor muscle strength and coordination  Pt to demonstrate an improved score in the FOTO Urinary survey  to at least 34% limitation to demonstrate improving continence.  (met)  Pt to increase pelvic floor strength to at least 4/5 to demonstrate improved strength needed for continence with ADLs. (Currently 4-/5)  Pt will be able to perform 20 jumping jacks without UI.     Plan   Plan of care Certification: 7/28/22 to 9/9/22.        Outpatient Physical Therapy 1 times weekly for 4 weeks to include the following interventions: therapeutic exercises, therapeutic activity, neuromuscular re-education, manual therapy, patient/family education and dry needling      Esther Zacarias, PT

## 2022-08-16 ENCOUNTER — CLINICAL SUPPORT (OUTPATIENT)
Dept: REHABILITATION | Facility: HOSPITAL | Age: 72
End: 2022-08-16
Payer: MEDICARE

## 2022-08-16 DIAGNOSIS — R27.8 OTHER LACK OF COORDINATION: ICD-10-CM

## 2022-08-16 DIAGNOSIS — M62.89 PELVIC FLOOR TENSION: ICD-10-CM

## 2022-08-16 DIAGNOSIS — N81.89 PELVIC FLOOR WEAKNESS IN FEMALE: Primary | ICD-10-CM

## 2022-08-16 PROCEDURE — 97110 THERAPEUTIC EXERCISES: CPT | Mod: PN

## 2022-08-16 PROCEDURE — 97112 NEUROMUSCULAR REEDUCATION: CPT | Mod: PN

## 2022-08-16 NOTE — PROGRESS NOTES
"  Pelvic Health Physical Therapy   Treatment Note     Name: Isela Giraldo  [facility] Number: [id number]    Therapy Diagnosis:   Encounter Diagnoses   Name Primary?    Pelvic floor weakness in female Yes    Pelvic floor tension     Other lack of coordination      Physician: Merced Johnson MD    Visit Date: 8/16/2022  Physician Orders: PT Eval and Treat   Medical Diagnosis from Referral: N39.46 (ICD-10-CM) - Mixed incontinence urge and stress  Evaluation Date: 3/22/2022  Authorization Period Expiration: 12/31/22  Plan of Care Expiration: 9/9/22  Progress Note Due: 8/28/22  Visit # / Visits authorized: 12/20 (+ eval)     FOTO: Issued Visit # 1, 5, 9    Time In: 2:08  Time Out: 2:58   Total Billable Time: 50 minutes    Precautions: Standard    Subjective     Pt reports: No UI reported. Has not been consistent with HEP this week.  Has been dealing with a flea infestation and has been taking an antihistamine which makes her sleepy.      FOTO Urinary Problem Survey: Initial limitation 43%  7/7/22 limitation 29%    She was  Not compliant with home exercise program.  Response to previous treatment: Positive  Functional change: less UI; getting up less at night to void    Pain: not an issue    Constitutional Symptoms Review: The patient denies having any constitutional symptoms.     Objective   Pt verbally consents to intravaginal treatment today.  Signed consent form already on file.       Therapeutic Exercise to develop  strength and endurance for 30 minutes including:    Hip adduction with ball x10 hold 5"  Bridges with ball  2x10 hold 5"   Abdominal march level 2 2x10  Isometric abdominals with small ball at knees (supine/table top position) x10  Clams with GTB 2x10 B   Hip abd with GTB 2x10   Open book rotation x10 B   Standing hip extension with GTB (leaning on counter) 2x10 B  Hip flexor Ulisses stretch 2x30" B    Squats with GTB at knees 2x10 (NP)  SL dead lift 0# 1x10 B   Speed skaters x30"  TA sets with ball " "in hands and squeeze x10 hold 5"  +Plyotoss with red ball (kickstand stance) x10 B    Not performed:  Side plank (modified from knees) 5x5" (NP due to soreness)  Quadruped hip extension (donkey kicks) 2x10 (NP)      Doris participated in neuromuscular re-education activities to develop Coordination, Control, Down training and Proprioception for 20 minutes including:   TA activation  Lateral band walk yellow sports loop x25' B  Tandem stand on foam 30"x2 B  Stand on foam narrow NESS with eyes closed 30" x2  SLS on foam surface 30" ea  +Standing row with knee lift - blue tubing 2x10 (single arm)    TA/PF coordination with breathing for all exercises    Manual Therapy 0 min to improve muscle extensibility/flexibility:  TP release of bilateral psoas mm and ITB with trigger point gun.     Home Exercises Provided and Patient Education Provided     Education provided:   - general anatomy/physiology of urinary/ bowel  system and benefits of treatment were discussed with the pt. Additionally, bladder irritants, anatomy/physiology of pelvic floor, kegels and fluid intake/dietary modifications were reviewed.   -Breathing with performance of plyo ex  -Perform tandem standing B at home daily for balance    Discussed progression of plan of care with patient; educated pt in activity modification; reviewed HEP with pt. Pt demonstrated and verbalized understanding of all instruction and was provided with a handout of HEP (see Patient Instructions).      Written Home Exercises Provided: continue prior HEP  Exercises were reviewed and Doris was able to demonstrate them prior to the end of the session.  Doris demonstrated good  understanding of the education provided.     See EMR under Patient Instructions for exercises provided 4/5/2022, 4/26/22, 5/3/22, 5/24/22, 5/31/22, 8/9/22    Assessment   .  Pt is reporting improved urinary urgency during the day and improved nocturia. UI is only happening rarely. Pt with noted continued balance " challenge with use of compliant foam for balance activity.  . Pt has met all STG's. Requires continued PT to address remaining LTG's.   Doris Is progressing well towards her goals.   Pt prognosis is Good.     Pt will continue to benefit from skilled outpatient physical therapy to address the deficits listed in the problem list box on initial evaluation, provide pt/family education and to maximize pt's level of independence in the home and community environment.     Pt's spiritual, cultural and educational needs considered and pt agreeable to plan of care and goals.     Anticipated barriers to physical therapy: none    Goals:  Short Term Goals: 4 weeks (progressing, not met)  Pt to be edu pelvic muscle bracing and be able to consistently perform correctly and quickly to help decrease incontinence with cough/laugh/sneeze. (met)  Pt to be able to perform a 5  second kegel x 10 reps to demonstrate improving strength and endurance needed for continence. (met)  Pt to demonstrate being able to correctly and consistently perform a kegel which is needed  to increase pelvic floor muscle coordination and strength needed for continence. (met)  Pt to report a decrease in urinary frequency from every 90 minutes to no more than once every 2.5 hours to improve ability to participate in social activities. (met)  Pt to report being able to sneeze without incontinence demonstrating improved pelvic floor strength and coordination to improve confidence in social situations  Pt to voice understanding of the role that diet plays on urinary urgency.  (met)           Long Term Goals: 8 weeks (progressing, not met)  Pt to be discharged with home plan for carry over after discharge.    Pt to be able to perform a 10 second kegel x 10 reps to demonstrate improving strength and endurance needed for continence.  Pt to report a decrease in pad usage to 0 pads a day to demonstrate improving pelvic floor muscle controls as evidenced by decreased  episodes of incontinence needed to improve confidence in social situations. (wearing 1/daily for security)  Pt to report no longer feeling the need to urinate just in case when shopping or participating in social activities to demonstrate improving pelvic floor and bladder control.  Pt to report a decrease in urinary frequency from every 90 minutes to no more than once every 3 hours to improve ability to participate in social activities.  Pt to report elimination of incontinence with ADLs to demonstrate improved pelvic floor muscle strength and coordination  Pt to demonstrate an improved score in the FOTO Urinary survey  to at least 34% limitation to demonstrate improving continence.  (met)  Pt to increase pelvic floor strength to at least 4/5 to demonstrate improved strength needed for continence with ADLs. (Currently 4-/5)  Pt will be able to perform 20 jumping jacks without UI.     Plan   Plan of care Certification: 7/28/22 to 9/9/22.        Outpatient Physical Therapy 1 times weekly for 4 weeks to include the following interventions: therapeutic exercises, therapeutic activity, neuromuscular re-education, manual therapy, patient/family education and dry needling      Esther Zacarias, PT

## 2022-08-23 ENCOUNTER — CLINICAL SUPPORT (OUTPATIENT)
Dept: REHABILITATION | Facility: HOSPITAL | Age: 72
End: 2022-08-23
Payer: MEDICARE

## 2022-08-23 DIAGNOSIS — R27.8 OTHER LACK OF COORDINATION: ICD-10-CM

## 2022-08-23 DIAGNOSIS — N81.89 PELVIC FLOOR WEAKNESS IN FEMALE: Primary | ICD-10-CM

## 2022-08-23 DIAGNOSIS — M62.89 PELVIC FLOOR TENSION: ICD-10-CM

## 2022-08-23 PROCEDURE — 97110 THERAPEUTIC EXERCISES: CPT | Mod: PN

## 2022-08-23 PROCEDURE — 97112 NEUROMUSCULAR REEDUCATION: CPT | Mod: PN

## 2022-08-23 NOTE — PROGRESS NOTES
"  Pelvic Health Physical Therapy   Treatment Note     Name: Isela Giraldo  [facility] Number: [id number]    Therapy Diagnosis:   Encounter Diagnoses   Name Primary?    Pelvic floor weakness in female Yes    Pelvic floor tension     Other lack of coordination      Physician: Merced Johnson MD    Visit Date: 8/23/2022  Physician Orders: PT Eval and Treat   Medical Diagnosis from Referral: N39.46 (ICD-10-CM) - Mixed incontinence urge and stress  Evaluation Date: 3/22/2022  Authorization Period Expiration: 12/31/22  Plan of Care Expiration: 9/9/22  Progress Note Due: 8/28/22  Visit # / Visits authorized: 13/20 (+ eval)     FOTO: Issued Visit # 1, 5, 9    Time In: 2:08  Time Out: 2:57  Total Billable Time: 49 minutes    Precautions: Standard    Subjective     Pt reports: No UI reported. Not wearing a pad today.       FOTO Urinary Problem Survey: Initial limitation 43%  7/7/22 limitation 29%    She was compliant with home exercise program.  Response to previous treatment: Positive  Functional change: less UI; getting up less at night to void; not always wearing a pad now.    Pain: not an issue    Constitutional Symptoms Review: The patient denies having any constitutional symptoms.     Objective   Pt verbally consents to intravaginal treatment today.  Signed consent form already on file.     Therapeutic Exercise to develop  strength and endurance for 30 minutes including:    Hip adduction with ball x10 hold 5"  Bridges with ball  2x10 hold 5"   Abdominal march level 2 2x10  Isometric abdominals with small ball at knees (supine/table top position) x10  Clams with GTB 2x10 B   Hip abd with GTB 2x10   Open book rotation x10 B   Modified quadruped (on elbows) hip extension with GTB  x10 B/ then pulse x10 B  Hip flexor Ulisses stretch 2x30" B    Squats with GTB at knees 2x10   SL dead lift 0# 1x10 B   Speed skaters x30"  TA sets with ball in hands and squeeze x10 hold 5"  +Plyotoss with red ball (kickstand stance) x10 " "B    Not performed:  Side plank (modified from knees) 5x5" (NP due to soreness)    Doris participated in neuromuscular re-education activities to develop Coordination, Control, Down training and Proprioception for 19 minutes including:   TA activation  Lateral band walk yellow sports loop x25' B  Tandem stand on foam 30"x2 B  Stand on foam narrow NESS with eyes closed 30" x2  SLS on foam surface 30" ea  +Standing row with knee lift - blue tubing 2x10 (single arm) (NP)    TA/PF coordination with breathing for all exercises    Manual Therapy 0 min to improve muscle extensibility/flexibility:  TP release of bilateral psoas mm and ITB with trigger point gun.     Home Exercises Provided and Patient Education Provided     Education provided:   - general anatomy/physiology of urinary/ bowel  system and benefits of treatment were discussed with the pt. Additionally, bladder irritants, anatomy/physiology of pelvic floor, kegels and fluid intake/dietary modifications were reviewed.   -Breathing with performance of plyo ex      Discussed progression of plan of care with patient; educated pt in activity modification; reviewed HEP with pt. Pt demonstrated and verbalized understanding of all instruction and was provided with a handout of HEP (see Patient Instructions).      Written Home Exercises Provided: continue prior HEP  Exercises were reviewed and Doris was able to demonstrate them prior to the end of the session.  Doris demonstrated good  understanding of the education provided.     See EMR under Patient Instructions for exercises provided 4/5/2022, 4/26/22, 5/3/22, 5/24/22, 5/31/22, 8/9/22    Assessment   .  Pt is reporting improved urinary urgency during the day and improved nocturia. UI is only happening rarely. Slowly improving balance and stability with higher level activity. Pt with noted continued balance challenge with use of compliant foam for balance activity.  . Pt has met all STG's. Requires continued PT to " address remaining LTG's.   Doris Is progressing well towards her goals.   Pt prognosis is Good.     Pt will continue to benefit from skilled outpatient physical therapy to address the deficits listed in the problem list box on initial evaluation, provide pt/family education and to maximize pt's level of independence in the home and community environment.     Pt's spiritual, cultural and educational needs considered and pt agreeable to plan of care and goals.     Anticipated barriers to physical therapy: none    Goals:  Short Term Goals: 4 weeks (progressing, not met)  Pt to be edu pelvic muscle bracing and be able to consistently perform correctly and quickly to help decrease incontinence with cough/laugh/sneeze. (met)  Pt to be able to perform a 5  second kegel x 10 reps to demonstrate improving strength and endurance needed for continence. (met)  Pt to demonstrate being able to correctly and consistently perform a kegel which is needed  to increase pelvic floor muscle coordination and strength needed for continence. (met)  Pt to report a decrease in urinary frequency from every 90 minutes to no more than once every 2.5 hours to improve ability to participate in social activities. (met)  Pt to report being able to sneeze without incontinence demonstrating improved pelvic floor strength and coordination to improve confidence in social situations  Pt to voice understanding of the role that diet plays on urinary urgency.  (met)           Long Term Goals: 8 weeks (progressing, not met)  Pt to be discharged with home plan for carry over after discharge.    Pt to be able to perform a 10 second kegel x 10 reps to demonstrate improving strength and endurance needed for continence.  Pt to report a decrease in pad usage to 0 pads a day to demonstrate improving pelvic floor muscle controls as evidenced by decreased episodes of incontinence needed to improve confidence in social situations. (wearing 1/daily for security)  Pt  to report no longer feeling the need to urinate just in case when shopping or participating in social activities to demonstrate improving pelvic floor and bladder control.  Pt to report a decrease in urinary frequency from every 90 minutes to no more than once every 3 hours to improve ability to participate in social activities.  Pt to report elimination of incontinence with ADLs to demonstrate improved pelvic floor muscle strength and coordination  Pt to demonstrate an improved score in the FOTO Urinary survey  to at least 34% limitation to demonstrate improving continence.  (met)  Pt to increase pelvic floor strength to at least 4/5 to demonstrate improved strength needed for continence with ADLs. (Currently 4-/5)  Pt will be able to perform 20 jumping jacks without UI.     Plan   Plan of care Certification: 7/28/22 to 9/9/22.        Outpatient Physical Therapy 1 times weekly for 4 weeks to include the following interventions: therapeutic exercises, therapeutic activity, neuromuscular re-education, manual therapy, patient/family education and dry needling      Esther Zacarias, PT

## 2022-08-29 ENCOUNTER — CLINICAL SUPPORT (OUTPATIENT)
Dept: REHABILITATION | Facility: HOSPITAL | Age: 72
End: 2022-08-29
Payer: MEDICARE

## 2022-08-29 DIAGNOSIS — M25.531 PAIN IN RIGHT WRIST: ICD-10-CM

## 2022-08-29 PROCEDURE — 97165 OT EVAL LOW COMPLEX 30 MIN: CPT | Mod: PN

## 2022-08-29 PROCEDURE — 97022 WHIRLPOOL THERAPY: CPT | Mod: PN

## 2022-08-29 PROCEDURE — 97110 THERAPEUTIC EXERCISES: CPT | Mod: PN

## 2022-08-29 NOTE — PATIENT INSTRUCTIONS
[facility], OCCUPATIONAL THERAPY  HOME EXERCISE PROGRAM     Moist heat x 10 minutes, 2 x daily.    Massage wrist for 2 minutes, 2 x daily    Complete the following exercises with 10 repetitions each, 3 x/day.     AROM: Wrist Flexion / Extension               Bend your wrist forward and back as far as possible with elbow extended.    AROM: Wrist Radial / Ulnar Deviation  Bend your wrist from side to side as far as possible.    AROM: Wrist Flexion / Extension  Make a fist, then bend your wrist forward then back as far as possible with elbow extended.       AROM: Wrist Radial / Ulnar Deviation   Make a fist then bend your wrist toward your body, then away.         Copyright © [employer]. All rights reserved.     Therapist: MAUDE Nava CHT

## 2022-08-29 NOTE — PLAN OF CARE
"  [facility] Occupational Therapy  Initial Evaluation     Name: Isela Giraldo  Clinic Number: [id number]    Therapy Diagnosis:   Encounter Diagnosis   Name Primary?    Pain in right wrist      Physician: Carl Peterson MD    Physician Orders: Eval and treat  Medical Diagnosis: M19.031 (ICD-10-CM) - Arthritis of right wrist   Surgical Procedure and Date: N/A  Evaluation Date: 8/29/2022  Insurance: Medicare  Insurance Authorization period Expiration: 12/31/2022   Plan of Care Certification Period: 8/29/2022 to 10/29/2022    Visit # / Visits Authortized: 1 / 1  Time In:12:15 pm  Time Out: 1:10 pm  Total Billable Time: 55 minutes    Precautions: Standard    Subjective     Involved Side: right  Dominant Side: Right  Date of Onset: mid June  Mechanism of Injury: Pt reports that she was holding a glass bottle and the bottle broke resulting in a deep laceration on the ulnar side of right wrist  History of Current Condition: Pt reports that she was referred to Dr Peterson and received sutures to right wrist. Pt reports that her wrist tendon was nicked but not severed and did not require surgical repair.  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     Current FunctionalStatus   Home/Living environment : lives alone      Limitation of Functional Status as follows:   ADLs/IADLs:     - Feeding:Independent    - Bathing: Independent    - Dressing/Grooming: Independent    - Driving: Independent     Leisure: Work out - difficulty with " weightbearing on right wrist      Past Medical History/Physical Systems Review:   Isela Giraldo  has a past medical history of Anxiety, Depression, and Osteoporosis.    Isela Giraldo  has a past surgical history that includes rectal fissure surgery.    Isela has a current medication list which includes the following prescription(s): albuterol, alprazolam, azelastine, bupropion, fluticasone propionate, and trazodone.    Review of patient's allergies indicates:   Allergen Reactions    Fosamax [alendronate]      Did not feel right    Iodine and iodide containing products     Ramelteon Other (See Comments)     Melatonen    Shrimp Hives     Hives and itching head to toe          Objective   Observation/Appearance:  Skin intact and scar well healed on ulnar side of right wrist    Edema:  None noted.    Hand/Wrist AROM. WNL    Palpation:  Moderate scar tissue noted with palpation over incision site.    Sensation:  Intact     Strength (Dyanmometer) and Pinch Strength (Pinch Gauge)  Measured in pounds and psi. Average of three trials.   8/29/2022 8/29/2022    Left Right   Rung II 40 40           CMS Impairment/Limitation/Restriction for FOTO Wrist Survey    Therapist reviewed FOTO scores for Isela Giraldo on 8/29/2022.   FOTO documents entered into [email or web address] - see Media section.    Limitation Score: 48%  Category: Carrying    Current : CK = at least 40% but < 60% impaired, limited or restricted  Goal: CJ = at least 20% but < 40% impaired, limited or restricted           Treatment     Treatment Time In: 12:15 pm  Treatment Time Out: 1:10 pm  Total Treatment time separate from Evaluation time:13 minutes    Doris received the following supervised modalities after being cleared for contradictions for 10 minutes:   -Patient received fluidotherapy to right hand for 10 minutes to increase blood flow, circulation, desensitization, sensory re-education and for pain management.       Doris received the  following direct contact modalities after being cleared for contraindications for 0 minutes:  -NT    Doris received the following manual therapy techniques for 5 minutes:   -IASTYM to ulnar side of wrist and forearm  -scar massage    Doris received therapeutic exercises for 8 minutes including:  -AROM as follows: wrist ext/flex with elbow extended (open and composite fist), RD/UD (open/closed fist)    Home Exercise Program/Education:  Issued HEP (see patient instructions in EMR) and educated on modality use for pain management . Exercises were reviewed and Doris was able to demonstrate them prior to the end of the session.   Pt received a written copy of exercises to perform at home. Doris demonstrated good  understanding of the education provided.  Pt was advised to perform these exercises free of pain, and to stop performing them if pain occurs.    Patient/Family Education: role of OT, goals for OT, scheduling/cancellations - pt verbalized understanding. Discussed insurance limitations with patient.    Additional Education provided: Kinesiotape to ulnar side of wrist with 25% stretch    Assessment     Isela Giraldo is a 71 y.o. female referred to outpatient occupational/hand therapy and presents with a medical diagnosis of right wrist arthritis, resulting in pain, decreased weight bearing activities and demonstrates limitations as described in the chart below. Following a brief medical record review it is determined that pt will benefit from occupational therapy services in order to maximize pain free and/or functional use of right hand. Ultrasound will be initiated next session to increase blood flow to scar tissue area.    The patient's rehab potential is Good.     Anticipated barriers to occupational therapy: none  Pt has no cultural, educational or language barriers to learning provided.    Profile and History Assessment of Occupational Performance Level of Clinical Decision Making Complexity Score  "  Occupational Profile:   Isela Giraldo is a 71 y.o. 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 Isela Giraldo has difficulty with  weightbearing on the palm of her hand without pain  affecting his/her daily functional abilities. His/her main goal for therapy is  "regain full use of right wrist"  .     Comorbidities:    has a past medical history of Anxiety, Depression, and Osteoporosis.        Medical and Therapy History Review:   Brief               Performance Deficits    Physical:  Skin Integrity/Scar Formation   Strength  Pain    Cognitive:  No Deficits    Psychosocial:    No Deficits     Clinical Decision Making:  low    Assessment Process:  Problem-Focused Assessments    Modification/Need for Assistance:  Not Necessary    Intervention Selection:  Limited Treatment Options       low  Based on PMHX, co morbidities , data from assessments and functional level of assistance required with task and clinical presentation directly impacting function.       The following goals were discussed with the patient and patient is in agreement with them as to be addressed in the treatment plan.     Goals:   Long Term (by discharge):  1)   Patient to be IND with HEP and modalities for pain management.  2)   Increase  strength 5 to 10 lbs. to grasp weights while working  out.  3)   Pt will report 0 out of 10 pain with right hand use.  4)   Patient to score at CJ on FOTO to demonstrate improved perception of functional right hand use.  5)   Pt will return to prior level of function for ADLs and household management using her right hand/wrist with weightbearing with no pain.      Plan   Certification Period/Plan of care expiration: 8/29/2022 to 10/29/2022.    Outpatient Occupational Therapy 1 times weekly for 8 weeks may include the following interventions: Paraffin, Fluidotherapy, Manual therapy/joint mobilizations, Modalities for pain management, US 3 mhz, Therapeutic exercises/activities., " Strengthening, and Scar Management.      Roseann Ly OT    I certify the need for these services furnished under the plan of treatment and while under my care.     ____________________________________________________________________  Physician/Referring Practitioner   Date of Signature

## 2022-08-30 ENCOUNTER — CLINICAL SUPPORT (OUTPATIENT)
Dept: REHABILITATION | Facility: HOSPITAL | Age: 72
End: 2022-08-30
Payer: MEDICARE

## 2022-08-30 DIAGNOSIS — M62.89 PELVIC FLOOR TENSION: ICD-10-CM

## 2022-08-30 DIAGNOSIS — R27.8 OTHER LACK OF COORDINATION: ICD-10-CM

## 2022-08-30 DIAGNOSIS — N81.89 PELVIC FLOOR WEAKNESS IN FEMALE: Primary | ICD-10-CM

## 2022-08-30 PROCEDURE — 97112 NEUROMUSCULAR REEDUCATION: CPT | Mod: PN

## 2022-08-30 PROCEDURE — 97110 THERAPEUTIC EXERCISES: CPT | Mod: PN

## 2022-08-30 NOTE — PROGRESS NOTES
"  Pelvic Health Physical Therapy   Treatment/ Discharge Note     Name: Isela Giraldo  [facility] Number: [id number]    Therapy Diagnosis:   Encounter Diagnoses   Name Primary?    Pelvic floor weakness in female Yes    Pelvic floor tension     Other lack of coordination      Physician: Merced Johnson MD    Visit Date: 8/30/2022  Physician Orders: PT Eval and Treat   Medical Diagnosis from Referral: N39.46 (ICD-10-CM) - Mixed incontinence urge and stress  Evaluation Date: 3/22/2022  Authorization Period Expiration: 12/31/22  Plan of Care Expiration: 9/9/22  Progress Note Due: 8/28/22  Visit # / Visits authorized: 14/20 (+ eval)     FOTO: Issued Visit # 1, 5, 9    Time In: 2:12  Time Out: 2:58  Total Billable Time: 46 minutes    Precautions: Standard    Subjective     Pt reports: No UI reported. Worked out with  and has muscle soreness today.  Feels she has accomplished what she wanted to with pelvic health PT.     FOTO Urinary Problem Survey: Initial limitation 43%  7/7/22 limitation 29%    She was compliant with home exercise program.  Response to previous treatment: Positive  Functional change: less UI; getting up less at night to void; not always wearing a pad now.    Pain: not an issue    Constitutional Symptoms Review: The patient denies having any constitutional symptoms.     Objective   Pt verbally consents to intravaginal treatment today.  Signed consent form already on file.     Therapeutic Exercise to develop  strength and endurance for 30 minutes including:  Hip adduction with ball x10 hold 5"  Bridges with ball squeeze x10 hold 5"   Squats with yellow sports loop 2x10   SL dead lift 0# 1x10 B   Tubing pull down with TA sets with yellow tubing 2x10  Speed skaters x30"  TA sets with ball in hands and squeeze x10 hold 5"  +Plyotoss with red ball (kickstand stance) x10 B    Not performed:  Abdominal march level 2 2x10  Isometric abdominals with small ball at knees (supine/table top position) " "x10  Clams with GTB 2x10 B   Hip abd with GTB 2x10   Open book rotation x10 B   Modified quadruped (on elbows) hip extension with GTB  x10 B/ then pulse x10 B  Hip flexor Ulisses stretch 2x30" B  Side plank (modified from knees) 5x5" (NP due to soreness)    Doris participated in neuromuscular re-education activities to develop Coordination, Control, Down training and Proprioception for 16 minutes including:   TA activation  Lateral band walk yellow sports loop x25' B  Tandem stand on foam 30"x2 B  Stand on foam narrow NESS with eyes closed 30" x2  SLS on foam surface 30" ea  Standing row with knee lift - blue tubing 2x10 (single arm)     TA/PF coordination with breathing for all exercises    Manual Therapy 0 min to improve muscle extensibility/flexibility:  TP release of bilateral psoas mm and ITB with trigger point gun.     Home Exercises Provided and Patient Education Provided     Education provided:   - general anatomy/physiology of urinary/ bowel  system and benefits of treatment were discussed with the pt. Additionally, bladder irritants, anatomy/physiology of pelvic floor, kegels and fluid intake/dietary modifications were reviewed.   -Breathing with performance of plyo ex      Discussed progression of plan of care with patient; educated pt in activity modification; reviewed HEP with pt. Pt demonstrated and verbalized understanding of all instruction and was provided with a handout of HEP (see Patient Instructions).      Written Home Exercises Provided: continue prior HEP  Exercises were reviewed and Doris was able to demonstrate them prior to the end of the session.  Doris demonstrated good  understanding of the education provided.     See EMR under Patient Instructions for exercises provided 4/5/2022, 4/26/22, 5/3/22, 5/24/22, 5/31/22, 8/9/22    Assessment   .  Pt is reporting improved urinary urgency during the day and improved nocturia. UI has not happened in a few months.Slowly improving balance and " stability with higher level activity. Pt to continue her HEP for continued balance and strength training  . Pt has met all STG's and all but one LTG set at initial eval. Pt is ready for discharge.     Pt's spiritual, cultural and educational needs considered and pt agreeable to plan of care and goals.     Anticipated barriers to physical therapy: none    Goals:  Short Term Goals: 4 weeks (progressing, not met)  Pt to be edu pelvic muscle bracing and be able to consistently perform correctly and quickly to help decrease incontinence with cough/laugh/sneeze. (met)  Pt to be able to perform a 5  second kegel x 10 reps to demonstrate improving strength and endurance needed for continence. (met)  Pt to demonstrate being able to correctly and consistently perform a kegel which is needed  to increase pelvic floor muscle coordination and strength needed for continence. (met)  Pt to report a decrease in urinary frequency from every 90 minutes to no more than once every 2.5 hours to improve ability to participate in social activities. (met)  Pt to report being able to sneeze without incontinence demonstrating improved pelvic floor strength and coordination to improve confidence in social situations  Pt to voice understanding of the role that diet plays on urinary urgency.  (met)           Long Term Goals: 8 weeks (progressing, not met)  Pt to be discharged with home plan for carry over after discharge.  (met)  Pt to be able to perform a 10 second kegel x 10 reps to demonstrate improving strength and endurance needed for continence. (met)  Pt to report a decrease in pad usage to 0 pads a day to demonstrate improving pelvic floor muscle controls as evidenced by decreased episodes of incontinence needed to improve confidence in social situations. (met)  Pt to report no longer feeling the need to urinate just in case when shopping or participating in social activities to demonstrate improving pelvic floor and bladder control.  (Met)  Pt to report a decrease in urinary frequency from every 90 minutes to no more than once every 3 hours to improve ability to participate in social activities.(met)  Pt to report elimination of incontinence with ADLs to demonstrate improved pelvic floor muscle strength and coordination (met)  Pt to demonstrate an improved score in the FOTO Urinary survey  to at least 34% limitation to demonstrate improving continence.  (met)  Pt to increase pelvic floor strength to at least 4/5 to demonstrate improved strength needed for continence with ADLs. (Currently 4-/5)  Pt will be able to perform 20 jumping jacks without UI.     Plan   Discharge PT      Esther Zacarias, PT

## 2022-09-06 ENCOUNTER — CLINICAL SUPPORT (OUTPATIENT)
Dept: REHABILITATION | Facility: HOSPITAL | Age: 72
End: 2022-09-06
Payer: MEDICARE

## 2022-09-06 DIAGNOSIS — M25.531 PAIN IN RIGHT WRIST: Primary | ICD-10-CM

## 2022-09-06 PROCEDURE — 97022 WHIRLPOOL THERAPY: CPT | Mod: PN

## 2022-09-06 PROCEDURE — 97110 THERAPEUTIC EXERCISES: CPT | Mod: PN

## 2022-09-06 PROCEDURE — 97035 APP MDLTY 1+ULTRASOUND EA 15: CPT | Mod: PN

## 2022-09-06 NOTE — PROGRESS NOTES
Occupational Therapy Daily Treatment Note     Date: 9/6/2022  Name: Isela Giraldo  Clinic Number: [id number]    Therapy Diagnosis: No diagnosis found.  Physician: Carl Peterson MD    Physician Orders: Eval and treat  Medical Diagnosis: M19.031 (ICD-10-CM) - Arthritis of right wrist   Surgical Procedure and Date: N/A  Evaluation Date: 8/29/2022  Insurance: Medicare  Insurance Authorization period Expiration: 12/31/2022   Plan of Care Certification Period: 8/29/2022 to 10/29/2022     Visit # / Visits Authortized: 2/ 1  Time In:1:20 pm  Time Out: 2:00 pm  Total Billable Time: minutes     Precautions: Standard    Subjective     Pt reports: she was compliant with home exercise program given last session.   Response to previous treatment:less pain  Functional change: none     Pain: 0/10  Location: right wrist    Objective   Palpation:  Moderate scar tissue noted with palpation over incision site.      Strength (Dyanmometer) and Pinch Strength (Pinch Gauge)  Measured in pounds and psi. Average of three trials.    8/29/2022 8/29/2022     Left Right   Rung II 40 40        Doris received the following supervised modalities after being cleared for contradictions for 10 minutes:   -Patient received fluidotherapy to right hand for 10 minutes to increase blood flow, circulation, desensitization, sensory re-education and for pain management.       Doris received the following direct contact modalities after being cleared for contraindications for 8 minutes:  -Patient received ultrasound to  left ulnar wrist area to increase blood flow, circulation, tissue elasticity, pain management and for wound/scar management for 8 minutes @ 3 Mhz, Intensity .8 w/cm2 at 100* duty cycle.       Doris received the following manual therapy techniques for 5 minutes:   --IASTYM to ulnar side of wrist and forearm  -scar massage    Doris received therapeutic exercises for 17 minutes including:  -wall push ups  x 10 reps  -wrist ext/flex stretches with elbow ext x 10 reps, hold for 10 seconds  -wrist ext/fex with 2 lb x 2' each    Home Exercises and Education Provided     Education provided:   - wall push ups x 10 to 15 reps, 3 x daily  - Progress towards goals: Good     Written Home Exercises Provided: Patient instructed to cont prior HEP.  Exercises were reviewed and Doris was able to demonstrate them prior to the end of the session.  Doris demonstrated good  understanding of the education provided.     See EMR under Patient Instructions for exercises provided prior visit.     Doris demonstrated good  understanding of the education provided.         Assessment   Isela Giraldo is a 71 y.o. female referred to outpatient occupational/hand therapy and presents with a medical diagnosis of right wrist arthritis, resulting in pain and decreased weight bearing activities on right hand. Pt is reporting less pain in her right wrist. Initiated ultrasound today to increase blood flow over scar area on wrist. Advanced activities with no increase in wrist pain.     Pt would continue to benefit from skilled OT.      Doris is progressing well towards her goals and there are no updates to goals at this time. Pt prognosis is Good.     Pt will continue to benefit from skilled outpatient occupational therapy to address the deficits listed in the problem list on initial evaluation provide pt/family education and to maximize pt's level of independence in the home and community environment.     Anticipated barriers to occupational therapy: none    Pt's spiritual, cultural and educational needs considered and pt agreeable to plan of care and goals.    Goals:  Long Term (by discharge):  1)   Patient to be IND with HEP and modalities for pain management.- Met 9/6/2022  2)   Increase  strength 5 to 10 lbs. to grasp weights while working  out.-progressing  3)   Pt will report 0 out of 10 pain with right hand use.-progressing  4)    Patient to score at CJ on FOTO to demonstrate improved perception of functional right hand use.-progressing  5)   Pt will return to prior level of function for ADLs and household management using her right hand/wrist with weightbearing with no pain.-progressing       Plan   Cont POC per initial evaluation. Treatment may include the following interventions: Paraffin, Fluidotherapy, Manual therapy/joint mobilizations, Modalities for pain management, US 3 mhz, Therapeutic exercises/activities., Strengthening, and Scar Management.   Discussed Plan of Care with patient: Yes  Updates/Grading for next session: Advance as tolerated.       Roseann Ly, OT

## 2022-09-12 ENCOUNTER — CLINICAL SUPPORT (OUTPATIENT)
Dept: REHABILITATION | Facility: HOSPITAL | Age: 72
End: 2022-09-12
Payer: MEDICARE

## 2022-09-12 DIAGNOSIS — M25.531 PAIN IN RIGHT WRIST: Primary | ICD-10-CM

## 2022-09-12 PROCEDURE — 97035 APP MDLTY 1+ULTRASOUND EA 15: CPT | Mod: PN

## 2022-09-12 PROCEDURE — 97022 WHIRLPOOL THERAPY: CPT | Mod: PN

## 2022-09-12 PROCEDURE — 97110 THERAPEUTIC EXERCISES: CPT | Mod: PN

## 2022-09-12 NOTE — PROGRESS NOTES
Occupational Therapy Daily Treatment Note     Date: 9/12/2022  Name: Isela Giraldo  Clinic Number: [id number]    Therapy Diagnosis:   Encounter Diagnosis   Name Primary?    Pain in right wrist Yes     Physician: Carl Peterson MD    Physician Orders: Eval and treat  Medical Diagnosis: M19.031 (ICD-10-CM) - Arthritis of right wrist   Surgical Procedure and Date: N/A  Evaluation Date: 8/29/2022  Insurance: Medicare  Insurance Authorization period Expiration: 12/31/2022   Plan of Care Certification Period: 8/29/2022 to 10/29/2022     Visit # / Visits Authortized: 3/ 1  Time In:3:05 pm  Time Out: 3:45 pm  Total Billable Time: 17 minutes     Precautions: Standard    Subjective     Pt reports: she was not compliant with home exercise program given last session. She states that she forgot to perform her wall push-ups at home.  Response to previous treatment:less pain  Functional change: none     Pain: 0/10  Location: right wrist    Objective   Palpation:  Minimal scar tissue noted with palpation over incision site.      Strength (Dyanmometer) and Pinch Strength (Pinch Gauge)  Measured in pounds and psi. Average of three trials.    8/29/2022 8/29/2022     Left Right   Rung II 40 40        Doris received the following supervised modalities after being cleared for contradictions for 10 minutes:   -Patient received fluidotherapy to right hand for 10 minutes to increase blood flow, circulation, desensitization, sensory re-education and for pain management.       Doris received the following direct contact modalities after being cleared for contraindications for 8 minutes:  -Patient received ultrasound to  left ulnar wrist area to increase blood flow, circulation, tissue elasticity, pain management and for wound/scar management for 8 minutes @ 3 Mhz, Intensity .8 w/cm2 at 100* duty cycle.       Doris received the following manual therapy techniques for 5 minutes:   --IASTYM to ulnar  side of wrist and forearm  -scar massage    Doris received therapeutic exercises for 17 minutes including:  -wall push ups x 10 reps  -wrist ext/flex stretches with elbow ext x 10 reps, hold for 10 seconds  -RD/UD x 10 reps each  -wrist ext/fex with 3 lb x 2' each    Home Exercises and Education Provided     Education provided:   - Cont HEP  - Progress towards goals: Good     Written Home Exercises Provided: Patient instructed to cont prior HEP.  Exercises were reviewed and Doris was able to demonstrate them prior to the end of the session.  Doris demonstrated good  understanding of the education provided.     See EMR under Patient Instructions for exercises provided prior visit.     Doris demonstrated good  understanding of the education provided.         Assessment   Isela Doris Giraldo is a 71 y.o. female referred to outpatient occupational/hand therapy and presents with a medical diagnosis of right wrist arthritis, resulting in pain and decreased weight bearing activities on right hand. Pt is reporting less pain in her right wrist. Minimal scar tissue is noted with palpation now. Advanced activities with no increase in wrist pain.     Pt would continue to benefit from skilled OT.      Doris is progressing well towards her goals and there are no updates to goals at this time. Pt prognosis is Good.     Pt will continue to benefit from skilled outpatient occupational therapy to address the deficits listed in the problem list on initial evaluation provide pt/family education and to maximize pt's level of independence in the home and community environment.     Anticipated barriers to occupational therapy: none    Pt's spiritual, cultural and educational needs considered and pt agreeable to plan of care and goals.    Goals:  Long Term (by discharge):  1)   Patient to be IND with HEP and modalities for pain management.- Met 9/6/2022  2)   Increase  strength 5 to 10 lbs. to grasp weights while working   out.-progressing  3)   Pt will report 0 out of 10 pain with right hand use.-progressing  4)   Patient to score at  on FOTO to demonstrate improved perception of functional right hand use.-progressing  5)   Pt will return to prior level of function for ADLs and household management using her right hand/wrist with weightbearing with no pain.-progressing       Plan: Measure  next session.   Cont POC per initial evaluation. Treatment may include the following interventions: Paraffin, Fluidotherapy, Manual therapy/joint mobilizations, Modalities for pain management, US 3 mhz, Therapeutic exercises/activities., Strengthening, and Scar Management.   Discussed Plan of Care with patient: Yes  Updates/Grading for next session: Advance as tolerated.       Roseann Ly, OT

## 2022-09-20 ENCOUNTER — CLINICAL SUPPORT (OUTPATIENT)
Dept: REHABILITATION | Facility: HOSPITAL | Age: 72
End: 2022-09-20
Payer: MEDICARE

## 2022-09-20 DIAGNOSIS — M25.531 PAIN IN RIGHT WRIST: Primary | ICD-10-CM

## 2022-09-20 PROCEDURE — 97110 THERAPEUTIC EXERCISES: CPT | Mod: PN

## 2022-09-20 PROCEDURE — 97035 APP MDLTY 1+ULTRASOUND EA 15: CPT | Mod: PN

## 2022-09-20 PROCEDURE — 97022 WHIRLPOOL THERAPY: CPT | Mod: PN

## 2022-09-20 NOTE — PROGRESS NOTES
Occupational Therapy Daily Treatment Note     Date: 9/20/2022  Name: Isela Giraldo  Clinic Number: [id number]    Therapy Diagnosis:   Encounter Diagnosis   Name Primary?    Pain in right wrist Yes     Physician: Carl Peterson MD    Physician Orders: Eval and treat  Medical Diagnosis: M19.031 (ICD-10-CM) - Arthritis of right wrist   Surgical Procedure and Date: N/A  Evaluation Date: 8/29/2022  Insurance: Medicare  Insurance Authorization period Expiration: 12/31/2022   Plan of Care Certification Period: 8/29/2022 to 10/29/2022     Visit # / Visits Authortized: 4/ 1  Time In:: 1:05 pm  Time Out: 2:45 pm  Total Billable Time: 17 minutes     Precautions: Standard    Subjective     Pt reports: she was not compliant with home exercise program given last session. She states that she is able to weight bear on all fours for a push up now.  Response to previous treatment:less pain  Functional change: none     Pain: 0/10  Location: right wrist    Objective   Palpation:  Minimal scar tissue noted with palpation over incision site.      Strength (Dyanmometer) and Pinch Strength (Pinch Gauge)  Measured in pounds and psi. Average of three trials.    8/29/2022 8/29/2022 9/20/2022     Left Right Right   Rung II 40 40 45        Doris received the following supervised modalities after being cleared for contradictions for 10 minutes:   -Patient received fluidotherapy to right hand for 10 minutes to increase blood flow, circulation, desensitization, sensory re-education and for pain management.       Doris received the following direct contact modalities after being cleared for contraindications for 8 minutes:  -Patient received ultrasound to  left ulnar wrist area to increase blood flow, circulation, tissue elasticity, pain management and for wound/scar management for 8 minutes @ 3 Mhz, Intensity .8 w/cm2 at 100* duty cycle.       Doris received the following manual therapy techniques for 5  minutes:   --IASTYM to ulnar side of wrist and forearm  -scar massage    Doris received therapeutic exercises for 17 minutes including:  -wall push ups x 10 reps  -wrist ext/flex stretches with elbow ext x 10 reps, hold for 10 seconds  -RD/UD x 10 reps each  -wrist ext/fex with 3 lb x 2' each    Home Exercises and Education Provided     Education provided:   - Cont HEP  - Progress towards goals: Good     Written Home Exercises Provided: Patient instructed to cont prior HEP.  Exercises were reviewed and Doris was able to demonstrate them prior to the end of the session.  Doris demonstrated good  understanding of the education provided.     See EMR under Patient Instructions for exercises provided prior visit.     Doris demonstrated good  understanding of the education provided.         Assessment   Isela Giraldo is a 71 y.o. female referred to outpatient occupational/hand therapy and presents with a medical diagnosis of right wrist arthritis, resulting in pain and decreased weight bearing activities on right hand. Pt is reporting less pain in her right wrist and is able to weightbear on all fours for a push-up now. Minimal scar tissue is noted with palpation now.Continued activities with no increase in wrist pain.     Pt would continue to benefit from skilled OT.      Doris is progressing well towards her goals and there are no updates to goals at this time. Pt prognosis is Good.     Pt will continue to benefit from skilled outpatient occupational therapy to address the deficits listed in the problem list on initial evaluation provide pt/family education and to maximize pt's level of independence in the home and community environment.     Anticipated barriers to occupational therapy: none    Pt's spiritual, cultural and educational needs considered and pt agreeable to plan of care and goals.    Goals:  Long Term (by discharge):  1)   Patient to be IND with HEP and modalities for pain management.- Met  9/6/2022  2)   Increase  strength 5 to 10 lbs. to grasp weights while working  out.- Met 9/20/2022  3)   Pt will report 0 out of 10 pain with right hand use.-progressing  4)   Patient to score at  on FOTO to demonstrate improved perception of functional right hand use.-progressing  5)   Pt will return to prior level of function for ADLs and household management using her right hand/wrist with weightbearing with no pain.-progressing       Plan:    Cont POC per initial evaluation. Treatment may include the following interventions: Paraffin, Fluidotherapy, Manual therapy/joint mobilizations, Modalities for pain management, US 3 mhz, Therapeutic exercises/activities., Strengthening, and Scar Management.   Discussed Plan of Care with patient: Yes  Updates/Grading for next session: Advance as tolerated.       Roseann Ly, OT

## 2022-10-25 ENCOUNTER — TELEPHONE (OUTPATIENT)
Dept: FAMILY MEDICINE | Facility: CLINIC | Age: 72
End: 2022-10-25
Payer: MEDICARE

## 2022-10-25 NOTE — TELEPHONE ENCOUNTER
----- Message from Rocky Oliver sent at 10/25/2022 10:47 AM CDT -----  Contact: pt at [phone]  Type:  Sooner Appointment Request    Caller is requesting a sooner appointment.  Caller declined first available appointment listed below.  Caller will not accept being placed on the waitlist and is requesting a message be sent to doctor.    Name of Caller:  pt  When is the first available appointment?  12/12/22  Symptoms:  med check  Best Call Back Number:  [phone]    Additional Information:  pt states she need a refill on her Xanax, she is down to less than a month's worth. Please call back and advise.

## 2022-11-07 ENCOUNTER — OFFICE VISIT (OUTPATIENT)
Dept: FAMILY MEDICINE | Facility: CLINIC | Age: 72
End: 2022-11-07
Payer: MEDICARE

## 2022-11-07 VITALS
DIASTOLIC BLOOD PRESSURE: 68 MMHG | HEIGHT: 62 IN | RESPIRATION RATE: 14 BRPM | BODY MASS INDEX: 22.08 KG/M2 | SYSTOLIC BLOOD PRESSURE: 126 MMHG | WEIGHT: 120 LBS | HEART RATE: 73 BPM

## 2022-11-07 DIAGNOSIS — Z13.6 ENCOUNTER FOR SCREENING FOR CARDIOVASCULAR DISORDERS: ICD-10-CM

## 2022-11-07 DIAGNOSIS — M81.0 OSTEOPOROSIS, UNSPECIFIED OSTEOPOROSIS TYPE, UNSPECIFIED PATHOLOGICAL FRACTURE PRESENCE: ICD-10-CM

## 2022-11-07 DIAGNOSIS — Z12.11 SPECIAL SCREENING FOR MALIGNANT NEOPLASMS, COLON: ICD-10-CM

## 2022-11-07 DIAGNOSIS — E67.3 HIGH VITAMIN D LEVEL: Primary | ICD-10-CM

## 2022-11-07 PROCEDURE — 99213 OFFICE O/P EST LOW 20 MIN: CPT | Mod: PBBFAC,PN | Performed by: FAMILY MEDICINE

## 2022-11-07 PROCEDURE — 99999 PR PBB SHADOW E&M-EST. PATIENT-LVL III: ICD-10-PCS | Mod: PBBFAC,,, | Performed by: FAMILY MEDICINE

## 2022-11-07 PROCEDURE — 99214 PR OFFICE/OUTPT VISIT, EST, LEVL IV, 30-39 MIN: ICD-10-PCS | Mod: S$PBB,,, | Performed by: FAMILY MEDICINE

## 2022-11-07 PROCEDURE — 99214 OFFICE O/P EST MOD 30 MIN: CPT | Mod: S$PBB,,, | Performed by: FAMILY MEDICINE

## 2022-11-07 PROCEDURE — 99999 PR PBB SHADOW E&M-EST. PATIENT-LVL III: CPT | Mod: PBBFAC,,, | Performed by: FAMILY MEDICINE

## 2022-11-07 NOTE — PROGRESS NOTES
"Subjective:       Patient ID: Isela Giraldo is a 72 y.o. female.    Chief Complaint: Follow-up    HPI  Patient in clinic for f/u and review.  Doing well of late.   Completed PT for the R wrist and happy with improvement.   Wellbutrin/xanax/trazodone working well.  Reviewed previous calcium score and ct chest.  Carnivore diet.  Labs 2022 rev.    Review of Systems:  Review of Systems   Constitutional:  Negative for fatigue, fever and unexpected weight change.   HENT:  Negative for congestion and sore throat.    Respiratory:  Negative for cough and shortness of breath.    Gastrointestinal:  Negative for diarrhea (improved when she discontinued her vitamin supplements) and nausea.   Genitourinary:  Negative for dysuria and frequency.   Musculoskeletal:  Negative for arthralgias (R hip issues with some activities, stable), back pain (issues with her lower back) and gait problem.   Skin:  Negative for color change and rash.   Neurological:  Negative for dizziness and headaches (improved once regular sinus regimen).   Psychiatric/Behavioral:  Negative for dysphoric mood and sleep disturbance (somewhat better).      Objective:     Vitals:    11/07/22 1427   BP: 126/68   BP Location: Left arm   Patient Position: Sitting   BP Method: Medium (Manual)   Pulse: 73   Resp: 14   Weight: 54.4 kg (120 lb)   Height: 5' 2" (1.575 m)          Physical Exam  Vitals and nursing note reviewed.   Constitutional:       General: She is not in acute distress.     Appearance: Normal appearance. She is well-developed.   HENT:      Head: Normocephalic and atraumatic.      Right Ear: Tympanic membrane normal.      Left Ear: Tympanic membrane normal.   Eyes:      General: No scleral icterus.        Right eye: No discharge.         Left eye: No discharge.      Conjunctiva/sclera: Conjunctivae normal.   Cardiovascular:      Rate and Rhythm: Normal rate and regular rhythm.   Pulmonary:      Effort: Pulmonary effort is normal. No respiratory " distress.      Breath sounds: Normal breath sounds.   Abdominal:      General: There is no distension.      Palpations: Abdomen is soft.      Tenderness: There is no guarding.   Musculoskeletal:         General: No deformity or signs of injury.      Cervical back: Neck supple. No tenderness.      Right lower leg: No edema.      Left lower leg: No edema.   Lymphadenopathy:      Cervical: No cervical adenopathy.   Skin:     General: Skin is warm and dry.      Findings: No rash.   Neurological:      General: No focal deficit present.      Mental Status: She is alert and oriented to person, place, and time.   Psychiatric:         Mood and Affect: Mood normal.         Behavior: Behavior normal.         Assessment & Plan:  High vitamin D level  -     Vitamin D; Future; Expected date: 11/07/2022    Special screening for malignant neoplasms, colon  -     Fecal Immunochemical Test (iFOBT); Future; Expected date: 11/07/2022    Encounter for screening for cardiovascular disorders  -     CT Cardiac Scoring; Future; Expected date: 11/07/2022    Osteoporosis, unspecified osteoporosis type, unspecified pathological fracture presence  -     DXA Bone Density Spine And Hip; Future; Expected date: 11/07/2022    Other orders  -     Cancel: Influenza - Quadrivalent (Adjuvanted)

## 2022-11-14 ENCOUNTER — LAB VISIT (OUTPATIENT)
Dept: LAB | Facility: HOSPITAL | Age: 72
End: 2022-11-14
Attending: FAMILY MEDICINE
Payer: MEDICARE

## 2022-11-14 DIAGNOSIS — Z12.11 SPECIAL SCREENING FOR MALIGNANT NEOPLASMS, COLON: ICD-10-CM

## 2022-11-14 PROCEDURE — 82274 ASSAY TEST FOR BLOOD FECAL: CPT | Performed by: FAMILY MEDICINE

## 2022-11-15 LAB — HEMOCCULT STL QL IA: NEGATIVE

## 2022-12-13 ENCOUNTER — HOSPITAL ENCOUNTER (OUTPATIENT)
Dept: RADIOLOGY | Facility: HOSPITAL | Age: 72
Discharge: HOME | End: 2022-12-13
Attending: FAMILY MEDICINE
Payer: MEDICARE

## 2022-12-13 DIAGNOSIS — M81.0 OSTEOPOROSIS, UNSPECIFIED OSTEOPOROSIS TYPE, UNSPECIFIED PATHOLOGICAL FRACTURE PRESENCE: ICD-10-CM

## 2022-12-13 PROCEDURE — 77080 DEXA BONE DENSITY SPINE HIP: ICD-10-PCS | Mod: 26,,, | Performed by: RADIOLOGY

## 2022-12-13 PROCEDURE — 77080 DXA BONE DENSITY AXIAL: CPT | Mod: 26,,, | Performed by: RADIOLOGY

## 2022-12-13 PROCEDURE — 77080 DXA BONE DENSITY AXIAL: CPT | Mod: TC,PO

## 2023-01-06 ENCOUNTER — OFFICE VISIT (OUTPATIENT)
Dept: FAMILY MEDICINE | Facility: CLINIC | Age: 73
End: 2023-01-06
Payer: MEDICARE

## 2023-01-06 VITALS
RESPIRATION RATE: 20 BRPM | OXYGEN SATURATION: 96 % | HEART RATE: 75 BPM | SYSTOLIC BLOOD PRESSURE: 120 MMHG | HEIGHT: 62 IN | DIASTOLIC BLOOD PRESSURE: 68 MMHG | TEMPERATURE: 98 F | BODY MASS INDEX: 21.95 KG/M2

## 2023-01-06 DIAGNOSIS — R09.81 NASAL CONGESTION: ICD-10-CM

## 2023-01-06 DIAGNOSIS — R06.2 WHEEZING: ICD-10-CM

## 2023-01-06 DIAGNOSIS — R05.1 ACUTE COUGH: Primary | ICD-10-CM

## 2023-01-06 DIAGNOSIS — J30.9 ALLERGIC RHINITIS, UNSPECIFIED SEASONALITY, UNSPECIFIED TRIGGER: ICD-10-CM

## 2023-01-06 LAB
CTP QC/QA: YES
CTP QC/QA: YES
POC MOLECULAR INFLUENZA A AGN: NEGATIVE
POC MOLECULAR INFLUENZA B AGN: NEGATIVE
SARS-COV-2 RDRP RESP QL NAA+PROBE: NEGATIVE

## 2023-01-06 PROCEDURE — 87635 SARS-COV-2 COVID-19 AMP PRB: CPT | Mod: QW,CR,S$GLB, | Performed by: NURSE PRACTITIONER

## 2023-01-06 PROCEDURE — 87502 INFLUENZA DNA AMP PROBE: CPT | Mod: QW,,, | Performed by: NURSE PRACTITIONER

## 2023-01-06 PROCEDURE — 99213 OFFICE O/P EST LOW 20 MIN: CPT | Mod: S$GLB,,, | Performed by: NURSE PRACTITIONER

## 2023-01-06 PROCEDURE — 99213 PR OFFICE/OUTPT VISIT, EST, LEVL III, 20-29 MIN: ICD-10-PCS | Mod: S$GLB,,, | Performed by: NURSE PRACTITIONER

## 2023-01-06 PROCEDURE — 87635: ICD-10-PCS | Mod: QW,CR,S$GLB, | Performed by: NURSE PRACTITIONER

## 2023-01-06 PROCEDURE — 87502 POCT INFLUENZA A/B MOLECULAR: ICD-10-PCS | Mod: QW,,, | Performed by: NURSE PRACTITIONER

## 2023-01-06 RX ORDER — LEVALBUTEROL INHALATION SOLUTION 1.25 MG/3ML
1 SOLUTION RESPIRATORY (INHALATION) EVERY 4 HOURS PRN
Qty: 30 ML | Refills: 3 | Status: SHIPPED | OUTPATIENT
Start: 2023-01-06 | End: 2023-01-09

## 2023-01-06 NOTE — PROGRESS NOTES
Subjective:       Patient ID: Isela Giraldo is a 72 y.o. female.    Chief Complaint:     HPI Patient of Dr. Johnson. Having cough for the past few weeks. Had sore throat for one day but that is gone. Having some chest congestion. Wheezing. Inhaler not helping. See ROS    The following portion of the patients history was reviewed and updated as appropriate: allergies, current medications, past medical and surgical history. Past social history and problem list reviewed. Family PMH and Past social history reviewed. Tobacco, Illicit drug use reviewed.      Review of patient's allergies indicates:   Allergen Reactions    Fosamax [alendronate]      Did not feel right    Iodine and iodide containing products     Ramelteon Other (See Comments)     Melatonen    Shrimp Hives     Hives and itching head to toe         Current Outpatient Medications:     albuterol 90 mcg/actuation inhaler, Inhale 1 puff into the lungs every 4 (four) hours as needed., Disp: 8.5 g, Rfl: 1    ALPRAZolam (XANAX) 0.25 MG tablet, TAKE 1 TABLET BY MOUTH NIGHTLY AS NEEDED FOR ANXIETY, Disp: 30 tablet, Rfl: 1    azelastine (ASTELIN) 137 mcg (0.1 %) nasal spray, 1 spray (137 mcg total) by Nasal route 2 (two) times daily. (Patient taking differently: 1 spray by Nasal route 2 (two) times daily as needed.), Disp: 30 mL, Rfl: 3    buPROPion (WELLBUTRIN XL) 300 MG 24 hr tablet, Take 1 tablet (300 mg total) by mouth once daily., Disp: 90 tablet, Rfl: 3    traZODone (DESYREL) 150 MG tablet, Take 1 tablet by mouth in the evening, Disp: 30 tablet, Rfl: 3    Past Medical History:   Diagnosis Date    Anxiety     Depression     Osteoporosis     recalls intolerant of fosamax with joint pain       Past Surgical History:   Procedure Laterality Date    rectal fissure surgery         Social History     Socioeconomic History    Marital status:     Number of children: 6   Tobacco Use    Smoking status: Never    Smokeless tobacco: Never   Substance and Sexual  "Activity    Alcohol use: No    Drug use: No    Sexual activity: Never     Partners: Male     Comment: rare     Review of Systems   Constitutional:  Negative for fatigue and fever.   HENT:  Positive for postnasal drip. Negative for congestion, rhinorrhea, sinus pressure, sinus pain, sore throat and voice change.         States she does not feel bad,  cough is concerning to her   Eyes:  Negative for visual disturbance.   Respiratory:  Positive for cough (productive.) and wheezing. Negative for chest tightness and shortness of breath.    Cardiovascular:  Negative for chest pain and palpitations.   Gastrointestinal:  Negative for abdominal pain, constipation, diarrhea, nausea and vomiting.   Musculoskeletal:  Negative for arthralgias, back pain and gait problem.   Neurological:  Negative for dizziness, weakness and headaches.     Objective:      /68   Pulse 75   Temp 98.2 °F (36.8 °C)   Resp 20   Ht 5' 2" (1.575 m)   SpO2 96%   BMI 21.95 kg/m²      Physical Exam  Vitals reviewed.   Constitutional:       General: She is not in acute distress.     Appearance: Normal appearance. She is well-developed and normal weight.   HENT:      Head: Normocephalic.      Right Ear: Tympanic membrane, ear canal and external ear normal.      Left Ear: Tympanic membrane, ear canal and external ear normal.      Nose: Rhinorrhea present. No congestion.      Mouth/Throat:      Mouth: Mucous membranes are moist.      Pharynx: Oropharynx is clear. No oropharyngeal exudate or posterior oropharyngeal erythema.   Eyes:      Conjunctiva/sclera: Conjunctivae normal.      Pupils: Pupils are equal, round, and reactive to light.   Neck:      Thyroid: No thyromegaly.      Trachea: Trachea normal. No tracheal tenderness or tracheal deviation.   Cardiovascular:      Rate and Rhythm: Normal rate and regular rhythm.      Pulses: Normal pulses.           Carotid pulses are 2+ on the right side and 2+ on the left side.       Radial pulses are 2+ " on the right side and 2+ on the left side.      Heart sounds: Normal heart sounds. No murmur heard.    No gallop.   Pulmonary:      Effort: Pulmonary effort is normal. No respiratory distress.      Breath sounds: No stridor. Wheezing (mild end exp wheeze) present. No rhonchi or rales.   Abdominal:      Palpations: There is no splenomegaly.   Musculoskeletal:         General: Normal range of motion.      Cervical back: Full passive range of motion without pain, normal range of motion and neck supple. No edema.      Right lower leg: No edema.      Left lower leg: No edema.      Comments: Gait and coordination normal.  strong, equal. Upper and lower extremity strength normal.    Skin:     General: Skin is warm and dry.      Capillary Refill: Capillary refill takes less than 2 seconds.   Neurological:      General: No focal deficit present.      Mental Status: She is alert and oriented to person, place, and time.      Gait: Gait normal.   Psychiatric:         Attention and Perception: Attention and perception normal.         Mood and Affect: Mood and affect normal.         Speech: Speech normal.         Behavior: Behavior normal.       Assessment:       1. Acute cough    2. Nasal congestion    3. Wheezing    4. Allergic rhinitis, unspecified seasonality, unspecified trigger        Plan:       Acute cough: OTC delsym.     Nasal congestion: covid and flu negative.   -     POCT COVID-19 Rapid Screening  -     POCT Influenza A/B Molecular    Wheezing: use nebulizer as needed. No indication that steroids or antibiotics are needed.     Allergic rhinitis, unspecified seasonality, unspecified trigger: claritin daily    Other orders  -      levalbuterol (XOPENEX) 1.25 mg/3 mL nebulizer solution; Take 3 mLs (1.25 mg total) by nebulization every 4 (four) hours as needed for Wheezing. Rescue  Dispense: 30 mL; Refill: 3       Continue current medication  Take medications only as prescribed  Healthy diet, exercise  Adequate  rest  Adequate hydration  Avoid allergens  Avoid excessive caffeine      Follow up if not improving

## 2023-01-09 ENCOUNTER — TELEPHONE (OUTPATIENT)
Dept: FAMILY MEDICINE | Facility: CLINIC | Age: 73
End: 2023-01-09
Payer: MEDICARE

## 2023-01-09 DIAGNOSIS — J45.41 MODERATE PERSISTENT ASTHMA WITH ACUTE EXACERBATION: ICD-10-CM

## 2023-01-09 RX ORDER — IPRATROPIUM BROMIDE AND ALBUTEROL SULFATE 2.5; .5 MG/3ML; MG/3ML
3 SOLUTION RESPIRATORY (INHALATION) EVERY 6 HOURS PRN
Qty: 75 ML | Refills: 0 | Status: SHIPPED | OUTPATIENT
Start: 2023-01-09 | End: 2024-01-09

## 2023-01-09 NOTE — TELEPHONE ENCOUNTER
----- Message from Ela Pruitt sent at 1/9/2023  2:56 PM CST -----  Regarding: SAME DAY NEW RX ORDER SENT IN PER PATIENT'S REQUEST  Contact: Patient  Type: Patient Call Back         Who called: Patient         What is the request in detail: came I for a visit on 1/6/22 for a Rx; states pharmacy will not fill Rx of levalbuterol (XOPENEX) 1.25 mg/3 mL nebulizer solution; states the insurance denied Rx due to improper diagnosis code; patient states they are requesting an entirely new Rx; please advise       Best call back number: [phone]         Additional Information:   [id number] - [place] - [address]  [address]  [facility] [address]  Phone: [phone] Fax: [phone]             Thank You    
I sent in Duo Neb nebulize solution.   
Statement Selected

## 2023-02-20 ENCOUNTER — TELEPHONE (OUTPATIENT)
Dept: FAMILY MEDICINE | Facility: CLINIC | Age: 73
End: 2023-02-20
Payer: MEDICARE

## 2023-02-20 NOTE — TELEPHONE ENCOUNTER
----- Message from Jasmine Bravo, Patient Care Assistant sent at 2/18/2023  9:21 AM CST -----  Regarding: appointment  Contact: pt  Type:  Sooner Appointment Request    Caller is requesting a sooner appointment.  Caller declined first available appointment listed below.  Caller will not accept being placed on the waitlist and is requesting a message be sent to doctor.    Name of Caller:  pt     When is the first available appointment?  3/2023    Symptoms:  coughing and congestion     Best Call Back Number:  [phone] (home)     Additional Information:  please call pt to advise. Thanks!

## 2023-02-20 NOTE — TELEPHONE ENCOUNTER
Spoke w/ pt. States she had dizzy and nausea from her allergies over the weekend. States she took an allergy med and it all went away. Advised pt that if this doesn't continue to help, call to see if we have any appts available or go to  clinic. Pt agreed.

## 2023-04-29 ENCOUNTER — HOSPITAL ENCOUNTER (OUTPATIENT)
Dept: RADIOLOGY | Facility: HOSPITAL | Age: 73
Discharge: HOME | End: 2023-04-29
Attending: FAMILY MEDICINE
Payer: MEDICARE

## 2023-04-29 DIAGNOSIS — Z12.31 ENCOUNTER FOR SCREENING MAMMOGRAM FOR MALIGNANT NEOPLASM OF BREAST: ICD-10-CM

## 2023-04-29 PROCEDURE — 77067 SCR MAMMO BI INCL CAD: CPT | Mod: 26,,, | Performed by: RADIOLOGY

## 2023-04-29 PROCEDURE — 77067 SCR MAMMO BI INCL CAD: CPT | Mod: TC,PO

## 2023-04-29 PROCEDURE — 77063 BREAST TOMOSYNTHESIS BI: CPT | Mod: 26,,, | Performed by: RADIOLOGY

## 2023-04-29 PROCEDURE — 77067 MAMMO DIGITAL SCREENING BILAT WITH TOMO: ICD-10-PCS | Mod: 26,,, | Performed by: RADIOLOGY

## 2023-04-29 PROCEDURE — 77063 MAMMO DIGITAL SCREENING BILAT WITH TOMO: ICD-10-PCS | Mod: 26,,, | Performed by: RADIOLOGY

## 2023-05-15 ENCOUNTER — OFFICE VISIT (OUTPATIENT)
Dept: FAMILY MEDICINE | Facility: CLINIC | Age: 73
End: 2023-05-15
Payer: MEDICARE

## 2023-05-15 VITALS
HEART RATE: 83 BPM | BODY MASS INDEX: 21.4 KG/M2 | TEMPERATURE: 98 F | DIASTOLIC BLOOD PRESSURE: 70 MMHG | SYSTOLIC BLOOD PRESSURE: 122 MMHG | RESPIRATION RATE: 16 BRPM | WEIGHT: 117 LBS | OXYGEN SATURATION: 98 %

## 2023-05-15 DIAGNOSIS — E78.1 PURE HYPERTRIGLYCERIDEMIA: Primary | ICD-10-CM

## 2023-05-15 DIAGNOSIS — F41.9 ANXIETY: ICD-10-CM

## 2023-05-15 DIAGNOSIS — G47.00 INSOMNIA, UNSPECIFIED TYPE: ICD-10-CM

## 2023-05-15 PROCEDURE — 99213 OFFICE O/P EST LOW 20 MIN: CPT | Mod: PBBFAC,PN | Performed by: FAMILY MEDICINE

## 2023-05-15 PROCEDURE — 99999 PR PBB SHADOW E&M-EST. PATIENT-LVL III: ICD-10-PCS | Mod: PBBFAC,,, | Performed by: FAMILY MEDICINE

## 2023-05-15 PROCEDURE — 99999 PR PBB SHADOW E&M-EST. PATIENT-LVL III: CPT | Mod: PBBFAC,,, | Performed by: FAMILY MEDICINE

## 2023-05-15 PROCEDURE — 99214 OFFICE O/P EST MOD 30 MIN: CPT | Mod: S$PBB,,, | Performed by: FAMILY MEDICINE

## 2023-05-15 PROCEDURE — 99214 PR OFFICE/OUTPT VISIT, EST, LEVL IV, 30-39 MIN: ICD-10-PCS | Mod: S$PBB,,, | Performed by: FAMILY MEDICINE

## 2023-05-15 RX ORDER — TRAZODONE HYDROCHLORIDE 150 MG/1
150 TABLET ORAL NIGHTLY
Qty: 30 TABLET | Refills: 3 | Status: SHIPPED | OUTPATIENT
Start: 2023-05-15

## 2023-05-15 RX ORDER — ALPRAZOLAM 0.25 MG/1
TABLET ORAL
Qty: 30 TABLET | Refills: 3 | Status: SHIPPED | OUTPATIENT
Start: 2023-05-15 | End: 2024-01-16

## 2023-05-15 NOTE — PROGRESS NOTES
Subjective:       Patient ID: Isela Giraldo is a 72 y.o. female.    Chief Complaint: Follow-up (Med check)    Follow-up  Pertinent negatives include no arthralgias (R hip issues with some activities, stable), congestion, coughing, fatigue, fever, headaches (improved once regular sinus regimen), nausea or sore throat.   Patient in clinic for med check.     Doing well on current regimen, no negative side effects or issues with availability.   Sleep and weight preserved. Stable dose over time, no dose adj needed.   checked and c/w reported.     Some increase in urinary incontinence since completing pelvic floor PT.     Review of Systems:  Review of Systems   Constitutional:  Negative for fatigue, fever and unexpected weight change.   HENT:  Negative for congestion and sore throat.    Respiratory:  Negative for cough and shortness of breath.    Gastrointestinal:  Negative for diarrhea (improved when she discontinued her vitamin supplements) and nausea.   Musculoskeletal:  Negative for arthralgias (R hip issues with some activities, stable), back pain (issues with her lower back) and gait problem.   Neurological:  Negative for dizziness and headaches (improved once regular sinus regimen).   Psychiatric/Behavioral:  Negative for dysphoric mood and sleep disturbance (somewhat better).      Objective:     Vitals:    05/15/23 1453   BP: 122/70   Pulse: 83   Resp: 16   Temp: 98.4 °F (36.9 °C)   SpO2: 98%   Weight: 53.1 kg (117 lb)          Physical Exam  Vitals and nursing note reviewed.   Constitutional:       General: She is not in acute distress.     Appearance: Normal appearance. She is well-developed.   HENT:      Head: Normocephalic and atraumatic.   Eyes:      General: No scleral icterus.        Right eye: No discharge.         Left eye: No discharge.      Conjunctiva/sclera: Conjunctivae normal.   Cardiovascular:      Rate and Rhythm: Normal rate.   Pulmonary:      Effort: Pulmonary effort is normal. No  respiratory distress.   Abdominal:      Palpations: Abdomen is soft.      Tenderness: There is no guarding.   Musculoskeletal:         General: No deformity or signs of injury.      Cervical back: Neck supple. No rigidity.   Skin:     General: Skin is warm and dry.      Findings: No rash.   Neurological:      General: No focal deficit present.      Mental Status: She is alert and oriented to person, place, and time.   Psychiatric:         Mood and Affect: Mood normal.         Behavior: Behavior normal.         Assessment & Plan:  Pure hypertriglyceridemia  -     CBC Without Differential; Future; Expected date: 05/15/2023  -     Comprehensive Metabolic Panel; Future; Expected date: 05/15/2023  -     TSH; Future; Expected date: 05/15/2023  -     Urinalysis, Reflex to Urine Culture Urine, Clean Catch; Future  -     Lipid Panel; Future; Expected date: 05/15/2023    Insomnia, unspecified type  Comments:  ok to continue trazodone at current dose, 150mg daily  Orders:  -     traZODone (DESYREL) 150 MG tablet; Take 1 tablet (150 mg total) by mouth every evening.  Dispense: 30 tablet; Refill: 3    Anxiety  Comments:  continue xanax prn  trial of generic wellbutrin  Orders:  -     ALPRAZolam (XANAX) 0.25 MG tablet; TAKE 1 TABLET BY MOUTH NIGHTLY AS NEEDED FOR ANXIETY  Dispense: 30 tablet; Refill: 3    Side effects and precautions of medication use reviewed with patient, expressed understanding. No questions or concerns.

## 2023-05-30 ENCOUNTER — HOSPITAL ENCOUNTER (OUTPATIENT)
Dept: RADIOLOGY | Facility: HOSPITAL | Age: 73
Discharge: HOME | End: 2023-05-30
Attending: FAMILY MEDICINE
Payer: MEDICARE

## 2023-05-30 DIAGNOSIS — Z13.6 ENCOUNTER FOR SCREENING FOR CARDIOVASCULAR DISORDERS: ICD-10-CM

## 2023-05-30 PROCEDURE — 75571 CT HRT W/O DYE W/CA TEST: CPT | Mod: TC,PO

## 2023-05-30 PROCEDURE — 75571 CT HRT W/O DYE W/CA TEST: CPT | Mod: 26,,, | Performed by: RADIOLOGY

## 2023-05-30 PROCEDURE — 75571 CT CALCIUM SCORING CARDIAC: ICD-10-PCS | Mod: 26,,, | Performed by: RADIOLOGY

## 2023-09-28 PROBLEM — I63.81 LACUNAR INFARCT, ACUTE: Status: ACTIVE | Noted: 2023-09-28

## 2023-09-28 PROBLEM — E78.5 HYPERLIPIDEMIA: Status: ACTIVE | Noted: 2023-09-28

## 2023-10-13 ENCOUNTER — TELEPHONE (OUTPATIENT)
Dept: FAMILY MEDICINE | Facility: CLINIC | Age: 73
End: 2023-10-13
Payer: MEDICARE

## 2023-10-13 NOTE — TELEPHONE ENCOUNTER
----- Message from Charisma Barton sent at 10/13/2023 10:40 AM CDT -----  Contact: ROSALEE ROYAL  - daughter [phone]    Type: Needs Medical Advice      Who Called:  ROSALEE ROYAL  - daughter     Best Call Back Number: [phone]    Additional Information: Patient daughter is calling to speak with nurse/MA regarding R/S appt to 10-26 or 10-27, 2023, patient will be out of town in [place] by daughter. Also, sent lab orders via email or patient portal.   Please call back and advise. Thanks

## 2023-10-27 ENCOUNTER — LAB VISIT (OUTPATIENT)
Dept: LAB | Facility: HOSPITAL | Age: 73
End: 2023-10-27
Attending: FAMILY MEDICINE
Payer: MEDICARE

## 2023-10-27 DIAGNOSIS — E67.3 HIGH VITAMIN D LEVEL: ICD-10-CM

## 2023-10-27 DIAGNOSIS — E78.1 PURE HYPERTRIGLYCERIDEMIA: ICD-10-CM

## 2023-10-27 LAB
25(OH)D3+25(OH)D2 SERPL-MCNC: 146 NG/ML (ref 30–96)
ALBUMIN SERPL BCP-MCNC: 4.2 G/DL (ref 3.5–5.2)
ALP SERPL-CCNC: 130 U/L (ref 55–135)
ALT SERPL W/O P-5'-P-CCNC: 28 U/L (ref 10–44)
ANION GAP SERPL CALC-SCNC: 12 MMOL/L (ref 8–16)
AST SERPL-CCNC: 28 U/L (ref 10–40)
BILIRUB SERPL-MCNC: 0.3 MG/DL (ref 0.1–1)
BUN SERPL-MCNC: 21 MG/DL (ref 8–23)
CALCIUM SERPL-MCNC: 9.8 MG/DL (ref 8.7–10.5)
CHLORIDE SERPL-SCNC: 100 MMOL/L (ref 95–110)
CHOLEST SERPL-MCNC: 302 MG/DL (ref 120–199)
CHOLEST/HDLC SERPL: 3.1 {RATIO} (ref 2–5)
CO2 SERPL-SCNC: 24 MMOL/L (ref 23–29)
CREAT SERPL-MCNC: 0.9 MG/DL (ref 0.5–1.4)
ERYTHROCYTE [DISTWIDTH] IN BLOOD BY AUTOMATED COUNT: 12.4 % (ref 11.5–14.5)
EST. GFR  (NO RACE VARIABLE): >60 ML/MIN/1.73 M^2
GLUCOSE SERPL-MCNC: 95 MG/DL (ref 70–110)
HCT VFR BLD AUTO: 42.3 % (ref 37–48.5)
HDLC SERPL-MCNC: 97 MG/DL (ref 40–75)
HDLC SERPL: 32.1 % (ref 20–50)
HGB BLD-MCNC: 13.8 G/DL (ref 12–16)
LDLC SERPL CALC-MCNC: 192 MG/DL (ref 63–159)
MCH RBC QN AUTO: 31.7 PG (ref 27–31)
MCHC RBC AUTO-ENTMCNC: 32.6 G/DL (ref 32–36)
MCV RBC AUTO: 97 FL (ref 82–98)
NONHDLC SERPL-MCNC: 205 MG/DL
PLATELET # BLD AUTO: 332 K/UL (ref 150–450)
PMV BLD AUTO: 9.8 FL (ref 9.2–12.9)
POTASSIUM SERPL-SCNC: 4.1 MMOL/L (ref 3.5–5.1)
PROT SERPL-MCNC: 7.6 G/DL (ref 6–8.4)
RBC # BLD AUTO: 4.36 M/UL (ref 4–5.4)
SODIUM SERPL-SCNC: 136 MMOL/L (ref 136–145)
TRIGL SERPL-MCNC: 65 MG/DL (ref 30–150)
TSH SERPL DL<=0.005 MIU/L-ACNC: 2.3 UIU/ML (ref 0.4–4)
WBC # BLD AUTO: 6.26 K/UL (ref 3.9–12.7)

## 2023-10-27 PROCEDURE — 82306 VITAMIN D 25 HYDROXY: CPT | Performed by: FAMILY MEDICINE

## 2023-10-27 PROCEDURE — 80053 COMPREHEN METABOLIC PANEL: CPT | Performed by: FAMILY MEDICINE

## 2023-10-27 PROCEDURE — 36415 COLL VENOUS BLD VENIPUNCTURE: CPT | Mod: PN | Performed by: FAMILY MEDICINE

## 2023-10-27 PROCEDURE — 84443 ASSAY THYROID STIM HORMONE: CPT | Performed by: FAMILY MEDICINE

## 2023-10-27 PROCEDURE — 80061 LIPID PANEL: CPT | Performed by: FAMILY MEDICINE

## 2023-10-27 PROCEDURE — 85027 COMPLETE CBC AUTOMATED: CPT | Performed by: FAMILY MEDICINE

## 2023-11-05 ENCOUNTER — NURSE TRIAGE (OUTPATIENT)
Dept: ADMINISTRATIVE | Facility: CLINIC | Age: 73
End: 2023-11-05
Payer: MEDICARE

## 2023-11-06 NOTE — TELEPHONE ENCOUNTER
Pts daughter called with c/o mom tested positive for covid and symptoms started yesterday with nausea and vomiting. Pt admits to some SOB but when but when asked questions she denied. Pts care advice to be seen by MD within 4 hours and offered ED or [facility] and pt will call back as needed. Pts son will be staying with her and told that he should wear a mask. Pt has appt tomorrow for F/u stroke in Sept and told that they may have to reschedule and I will reach out to office and they can let her know. Please call pt if appt needs to be moved.                     Reason for Disposition   [1] Fever > 101 F (38.3 C) AND [2] age > 60 years    Additional Information   Negative: SEVERE difficulty breathing (e.g., struggling for each breath, speaks in single words)   Negative: Difficult to awaken or acting confused (e.g., disoriented, slurred speech)   Negative: Bluish (or gray) lips or face now   Negative: Shock suspected (e.g., cold/pale/clammy skin, too weak to stand, low BP, rapid pulse)   Negative: Sounds like a life-threatening emergency to the triager   Negative: SEVERE or constant chest pain or pressure  (Exception: Mild central chest pain, present only when coughing.)   Negative: MODERATE difficulty breathing (e.g., speaks in phrases, SOB even at rest, pulse 100-120)   Negative: [1] Headache AND [2] stiff neck (can't touch chin to chest)   Negative: Oxygen level (e.g., pulse oximetry) 90 percent or lower   Negative: Chest pain or pressure  (Exception: MILD central chest pain, present only when coughing.)   Negative: [1] Drinking very little AND [2] dehydration suspected (e.g., no urine > 12 hours, very dry mouth, very lightheaded)   Negative: Patient sounds very sick or weak to the triager   Negative: MILD difficulty breathing (e.g., minimal/no SOB at rest, SOB with walking, pulse <100)   Negative: Fever > 103 F (39.4 C)    Protocols used: Coronavirus (COVID-19) Diagnosed or [id number]

## 2023-11-17 ENCOUNTER — TELEPHONE (OUTPATIENT)
Dept: FAMILY MEDICINE | Facility: CLINIC | Age: 73
End: 2023-11-17
Payer: MEDICARE

## 2023-11-17 DIAGNOSIS — E55.9 VITAMIN D DEFICIENCY: Primary | ICD-10-CM

## 2023-11-17 NOTE — TELEPHONE ENCOUNTER
----- Message from Marley Kemp sent at 11/17/2023  3:22 PM CST -----  Regarding: Returning call  Type:  Patient Returning Call    Who Called:  Doris  Who Left Message for Patient:  Kaitlin  Does the patient know what this is regarding?:  No  Best Call Back Number:  [phone]    Additional Information:         negative

## 2023-11-17 NOTE — TELEPHONE ENCOUNTER
Notified pt of results- pt would like appt scheduled with Dr. Johnson - appt schedule.     Will schedule labs at appt per pt request

## 2023-11-17 NOTE — TELEPHONE ENCOUNTER
Please stop any vitamin D supplementation as the level is too high. Repeat 3mos to ensure that it's improving.   Remainder labs were stable including elevated cholesterol panel albeit slightly improved.

## 2023-12-04 ENCOUNTER — TELEPHONE (OUTPATIENT)
Dept: FAMILY MEDICINE | Facility: CLINIC | Age: 73
End: 2023-12-04
Payer: MEDICARE

## 2023-12-04 DIAGNOSIS — E78.1 PURE HYPERTRIGLYCERIDEMIA: Primary | ICD-10-CM

## 2023-12-04 NOTE — TELEPHONE ENCOUNTER
Attempted to call patient but was unsuccessful. LMOM for patient to r/c to clinic      Notified via VM that labs have been ordered

## 2023-12-04 NOTE — TELEPHONE ENCOUNTER
----- Message from Nolan Rodriguez sent at 12/4/2023  9:50 AM CST -----  Regarding: advise  Contact: Patient  Type: Needs Medical Advice  Who Called:  Patient   Symptoms (please be specific):    How long has patient had these symptoms:    Pharmacy name and phone #:    Best Call Back Number: [phone]  Additional Information: Pt stated that she had a stroke and  would like to have orders to do a full panel of labs before seeing the provider on 12/18.

## 2023-12-05 ENCOUNTER — LAB VISIT (OUTPATIENT)
Dept: LAB | Facility: HOSPITAL | Age: 73
End: 2023-12-05
Attending: FAMILY MEDICINE
Payer: MEDICARE

## 2023-12-05 DIAGNOSIS — E78.1 PURE HYPERTRIGLYCERIDEMIA: ICD-10-CM

## 2023-12-05 DIAGNOSIS — E55.9 VITAMIN D DEFICIENCY: ICD-10-CM

## 2023-12-05 LAB
25(OH)D3+25(OH)D2 SERPL-MCNC: 126 NG/ML (ref 30–96)
ALBUMIN SERPL BCP-MCNC: 4 G/DL (ref 3.5–5.2)
ALP SERPL-CCNC: 115 U/L (ref 55–135)
ALT SERPL W/O P-5'-P-CCNC: 19 U/L (ref 10–44)
ANION GAP SERPL CALC-SCNC: 14 MMOL/L (ref 8–16)
AST SERPL-CCNC: 25 U/L (ref 10–40)
BILIRUB SERPL-MCNC: 0.4 MG/DL (ref 0.1–1)
BILIRUB UR QL STRIP: NEGATIVE
BUN SERPL-MCNC: 23 MG/DL (ref 8–23)
CALCIUM SERPL-MCNC: 9.6 MG/DL (ref 8.7–10.5)
CHLORIDE SERPL-SCNC: 105 MMOL/L (ref 95–110)
CHOLEST SERPL-MCNC: 390 MG/DL (ref 120–199)
CHOLEST/HDLC SERPL: 3.8 {RATIO} (ref 2–5)
CLARITY UR REFRACT.AUTO: CLEAR
CO2 SERPL-SCNC: 21 MMOL/L (ref 23–29)
COLOR UR AUTO: YELLOW
CREAT SERPL-MCNC: 0.9 MG/DL (ref 0.5–1.4)
ERYTHROCYTE [DISTWIDTH] IN BLOOD BY AUTOMATED COUNT: 13.1 % (ref 11.5–14.5)
EST. GFR  (NO RACE VARIABLE): >60 ML/MIN/1.73 M^2
GLUCOSE SERPL-MCNC: 92 MG/DL (ref 70–110)
GLUCOSE UR QL STRIP: NEGATIVE
HCT VFR BLD AUTO: 39 % (ref 37–48.5)
HDLC SERPL-MCNC: 102 MG/DL (ref 40–75)
HDLC SERPL: 26.2 % (ref 20–50)
HGB BLD-MCNC: 13.7 G/DL (ref 12–16)
HGB UR QL STRIP: NEGATIVE
KETONES UR QL STRIP: NEGATIVE
LDLC SERPL CALC-MCNC: 275.4 MG/DL (ref 63–159)
LEUKOCYTE ESTERASE UR QL STRIP: NEGATIVE
MCH RBC QN AUTO: 31.6 PG (ref 27–31)
MCHC RBC AUTO-ENTMCNC: 35.1 G/DL (ref 32–36)
MCV RBC AUTO: 90 FL (ref 82–98)
NITRITE UR QL STRIP: NEGATIVE
NONHDLC SERPL-MCNC: 288 MG/DL
PH UR STRIP: 6 [PH] (ref 5–8)
PLATELET # BLD AUTO: 287 K/UL (ref 150–450)
PMV BLD AUTO: 9.6 FL (ref 9.2–12.9)
POTASSIUM SERPL-SCNC: 4.4 MMOL/L (ref 3.5–5.1)
PROT SERPL-MCNC: 7.5 G/DL (ref 6–8.4)
PROT UR QL STRIP: NEGATIVE
RBC # BLD AUTO: 4.33 M/UL (ref 4–5.4)
SODIUM SERPL-SCNC: 140 MMOL/L (ref 136–145)
SP GR UR STRIP: 1.02 (ref 1–1.03)
TRIGL SERPL-MCNC: 63 MG/DL (ref 30–150)
TSH SERPL DL<=0.005 MIU/L-ACNC: 1.93 UIU/ML (ref 0.4–4)
URN SPEC COLLECT METH UR: NORMAL
WBC # BLD AUTO: 4.61 K/UL (ref 3.9–12.7)

## 2023-12-05 PROCEDURE — 85027 COMPLETE CBC AUTOMATED: CPT | Performed by: FAMILY MEDICINE

## 2023-12-05 PROCEDURE — 80053 COMPREHEN METABOLIC PANEL: CPT | Performed by: FAMILY MEDICINE

## 2023-12-05 PROCEDURE — 80061 LIPID PANEL: CPT | Performed by: FAMILY MEDICINE

## 2023-12-05 PROCEDURE — 82306 VITAMIN D 25 HYDROXY: CPT | Performed by: FAMILY MEDICINE

## 2023-12-05 PROCEDURE — 84443 ASSAY THYROID STIM HORMONE: CPT | Performed by: FAMILY MEDICINE

## 2023-12-05 PROCEDURE — 36415 COLL VENOUS BLD VENIPUNCTURE: CPT | Mod: PN | Performed by: FAMILY MEDICINE

## 2023-12-05 PROCEDURE — 81003 URINALYSIS AUTO W/O SCOPE: CPT | Performed by: FAMILY MEDICINE

## 2023-12-15 ENCOUNTER — PATIENT MESSAGE (OUTPATIENT)
Dept: FAMILY MEDICINE | Facility: CLINIC | Age: 73
End: 2023-12-15
Payer: MEDICARE

## 2023-12-15 NOTE — PROGRESS NOTES
Vitamin D level is still very elevated. Please make sure she is not taking any vitamin D currently.

## 2023-12-18 ENCOUNTER — OFFICE VISIT (OUTPATIENT)
Dept: FAMILY MEDICINE | Facility: CLINIC | Age: 73
End: 2023-12-18
Payer: MEDICARE

## 2023-12-18 VITALS
OXYGEN SATURATION: 98 % | WEIGHT: 108 LBS | DIASTOLIC BLOOD PRESSURE: 74 MMHG | HEART RATE: 68 BPM | SYSTOLIC BLOOD PRESSURE: 130 MMHG | BODY MASS INDEX: 20.39 KG/M2 | HEIGHT: 61 IN

## 2023-12-18 DIAGNOSIS — I63.81 LACUNAR STROKE: ICD-10-CM

## 2023-12-18 DIAGNOSIS — R93.1 ELEVATED CORONARY ARTERY CALCIUM SCORE: ICD-10-CM

## 2023-12-18 DIAGNOSIS — R26.89 BALANCE DISORDER: ICD-10-CM

## 2023-12-18 DIAGNOSIS — Z13.6 ENCOUNTER FOR SCREENING FOR CARDIOVASCULAR DISORDERS: ICD-10-CM

## 2023-12-18 DIAGNOSIS — R93.1 ABNORMAL FINDINGS ON DIAGNOSTIC IMAGING OF HEART AND CORONARY CIRCULATION: Primary | ICD-10-CM

## 2023-12-18 DIAGNOSIS — R32 URINARY INCONTINENCE, UNSPECIFIED TYPE: ICD-10-CM

## 2023-12-18 DIAGNOSIS — E55.9 VITAMIN D DEFICIENCY: ICD-10-CM

## 2023-12-18 DIAGNOSIS — R53.1 GENERALIZED WEAKNESS: ICD-10-CM

## 2023-12-18 DIAGNOSIS — R29.898 HAND WEAKNESS: ICD-10-CM

## 2023-12-18 DIAGNOSIS — Z12.11 SPECIAL SCREENING FOR MALIGNANT NEOPLASMS, COLON: ICD-10-CM

## 2023-12-18 PROBLEM — E78.5 HYPERLIPIDEMIA: Status: RESOLVED | Noted: 2023-09-28 | Resolved: 2023-12-18

## 2023-12-18 PROCEDURE — 99214 PR OFFICE/OUTPT VISIT, EST, LEVL IV, 30-39 MIN: ICD-10-PCS | Mod: S$PBB,,, | Performed by: FAMILY MEDICINE

## 2023-12-18 PROCEDURE — 99999 PR PBB SHADOW E&M-EST. PATIENT-LVL V: CPT | Mod: PBBFAC,,, | Performed by: FAMILY MEDICINE

## 2023-12-18 PROCEDURE — 99999 PR PBB SHADOW E&M-EST. PATIENT-LVL V: ICD-10-PCS | Mod: PBBFAC,,, | Performed by: FAMILY MEDICINE

## 2023-12-18 PROCEDURE — 99214 OFFICE O/P EST MOD 30 MIN: CPT | Mod: S$PBB,,, | Performed by: FAMILY MEDICINE

## 2023-12-18 PROCEDURE — 99215 OFFICE O/P EST HI 40 MIN: CPT | Mod: PBBFAC,PN | Performed by: FAMILY MEDICINE

## 2023-12-18 NOTE — PROGRESS NOTES
Subjective:       Patient ID: Isela Giraldo is a 73 y.o. female.    Chief Complaint: Follow-up (Med check and lab results)    Follow-up  Associated symptoms include fatigue. Pertinent negatives include no arthralgias (R hip issues with some activities, stable), chest pain, congestion, coughing, fever, headaches (improved once regular sinus regimen) or sore throat.     Patient seen for f/u.   Since lov, had ER eval for lacunar stroke (9/28/23). She recalls that her family told her to go to the ER bc she was a little confused. She had experienced some dizziness that she had attributed to allergies previously, since resolved without recurrence. She does note, however, that she cannot write properly since the stroke. +low energy. She finds her speech is a little more halting, some word finding that was not an issue prev. Diet was previously 100% carnivore, but family now introducing other food types.   Outside neuro f/u - they dc'd the plavix and she took herself off of the lipitor. She notes that she had a lot of undue stress at the time, since resolved.   Reviewed imaging from hosp eval, discussed findings and recommendations with re preventing further recurrences.     Review of Systems:  Review of Systems   Constitutional:  Positive for fatigue. Negative for fever and unexpected weight change.   HENT:  Negative for congestion and sore throat.    Eyes:  Negative for visual disturbance.   Respiratory:  Negative for cough and shortness of breath.    Cardiovascular:  Negative for chest pain and palpitations.   Gastrointestinal:  Negative for constipation and diarrhea (improved when she discontinued her vitamin supplements).   Genitourinary:  Positive for frequency. Negative for difficulty urinating.   Musculoskeletal:  Negative for arthralgias (R hip issues with some activities, stable), back pain (issues with her lower back) and gait problem.   Neurological:  Negative for dizziness and headaches (improved  "once regular sinus regimen).   Psychiatric/Behavioral:  Positive for dysphoric mood (acknowledges small bouts of depression, but getting herself out of them). Negative for sleep disturbance (somewhat better). The patient is not nervous/anxious.        Objective:     Vitals:    12/18/23 1108   BP: 130/74   Pulse: 68   SpO2: 98%   Weight: 49 kg (108 lb)   Height: 5' 1" (1.549 m)          Physical Exam  Vitals and nursing note reviewed.   Constitutional:       General: She is not in acute distress.     Appearance: Normal appearance. She is well-developed.   HENT:      Head: Normocephalic and atraumatic.      Right Ear: Tympanic membrane and external ear normal.      Left Ear: Tympanic membrane and external ear normal.      Nose: Nose normal.      Mouth/Throat:      Pharynx: No oropharyngeal exudate.   Eyes:      Conjunctiva/sclera: Conjunctivae normal.      Pupils: Pupils are equal, round, and reactive to light.   Neck:      Thyroid: No thyromegaly.   Cardiovascular:      Rate and Rhythm: Normal rate and regular rhythm.   Pulmonary:      Effort: Pulmonary effort is normal. No respiratory distress.      Breath sounds: Normal breath sounds. No wheezing.   Abdominal:      General: Bowel sounds are normal. There is no distension.      Palpations: Abdomen is soft. There is no mass.      Tenderness: There is no abdominal tenderness. There is no guarding or rebound.   Musculoskeletal:      Cervical back: Neck supple.      Right lower leg: No edema.      Left lower leg: No edema.   Lymphadenopathy:      Cervical: No cervical adenopathy.   Skin:     General: Skin is warm and dry.   Neurological:      General: No focal deficit present.      Mental Status: She is alert and oriented to person, place, and time.      Cranial Nerves: No cranial nerve deficit.   Psychiatric:         Mood and Affect: Mood normal.         Behavior: Behavior normal.           Assessment & Plan:  Abnormal findings on diagnostic imaging of heart and " coronary circulation  -     NM Myocardial Perfusion Spect Multi Pharmacologic; Future; Expected date: 12/18/2023    Special screening for malignant neoplasms, colon  -     Fecal Immunochemical Test (iFOBT); Future; Expected date: 12/18/2023    Vitamin D deficiency  -     Vitamin D; Future; Expected date: 03/18/2024    Lacunar stroke  -     Ambulatory referral/consult to Physical/Occupational Therapy; Future; Expected date: 12/25/2023  -     Ambulatory referral/consult to Physical/Occupational Therapy; Future; Expected date: 12/25/2023  -     Nuclear Stress Test; Future    Hand weakness  -     Ambulatory referral/consult to Physical/Occupational Therapy; Future; Expected date: 12/25/2023    Generalized weakness  -     Ambulatory referral/consult to Physical/Occupational Therapy; Future; Expected date: 12/25/2023    Balance disorder  -     Ambulatory referral/consult to Physical/Occupational Therapy; Future; Expected date: 12/25/2023    Urinary incontinence, unspecified type  -     Urinalysis, Reflex to Urine Culture Urine, Clean Catch; Future    Elevated coronary artery calcium score  -     Cancel: Stress Echo Which stress agent will be used? Pharmacological; Color Flow Doppler? No; Future  -     Nuclear Stress Test; Future    Encounter for screening for cardiovascular disorders  -     NM Myocardial Perfusion Spect Multi Pharmacologic; Future; Expected date: 12/18/2023  -     Nuclear Stress Test; Future    Discussed importance of BP and cholesterol control. Reviewed recommendations for stress test update, but also f/u with cardiology for further review.   Reviewed PT and OT consult re hand/arm sx as well as balance changes since stroke.

## 2024-01-14 DIAGNOSIS — F41.9 ANXIETY: ICD-10-CM

## 2024-01-15 NOTE — TELEPHONE ENCOUNTER
No care due was identified.  [facility] Embedded Care Due Messages. Reference number: [id number].   1/14/2024 11:27:43 PM CST

## 2024-01-16 RX ORDER — ALPRAZOLAM 0.25 MG/1
TABLET ORAL
Qty: 30 TABLET | Refills: 1 | Status: SHIPPED | OUTPATIENT
Start: 2024-01-16 | End: 2024-06-06

## 2024-01-18 ENCOUNTER — CLINICAL SUPPORT (OUTPATIENT)
Dept: REHABILITATION | Facility: HOSPITAL | Age: 74
End: 2024-01-18
Attending: FAMILY MEDICINE
Payer: MEDICARE

## 2024-01-18 DIAGNOSIS — R29.898 WEAKNESS OF BOTH HANDS: Primary | ICD-10-CM

## 2024-01-18 DIAGNOSIS — I63.81 LACUNAR STROKE: ICD-10-CM

## 2024-01-18 DIAGNOSIS — R53.1 GENERALIZED WEAKNESS: ICD-10-CM

## 2024-01-18 DIAGNOSIS — R26.89 BALANCE DISORDER: ICD-10-CM

## 2024-01-18 PROCEDURE — 97165 OT EVAL LOW COMPLEX 30 MIN: CPT | Mod: PN

## 2024-01-18 NOTE — PATIENT INSTRUCTIONS
[facility]  OCCUPATIONAL THERAPY  HOME EXERCISE PROGRAM     Complete the following strengthening program 1x/day.                       _                        MAUDE Nava, OLEKSANDR  Certified Hand Therapist  Occupational Therapist

## 2024-01-18 NOTE — PLAN OF CARE
[facility]  Occupational Therapy Initial Evaluation      Name: Isela Giraldo  Clinic Number: [id number]    Therapy Diagnosis:   Encounter Diagnoses   Name Primary?    Lacunar stroke     Generalized weakness     Balance disorder     Weakness of both hands Yes     Physician: Merced Johnson MD    Physician Orders: Eval and Treat  Medical Diagnosis: I63.81 (ICD-10-CM) - Lacunar stroke R29.898 (ICD-10-CM) - Hand weakness  Surgical Procedure and Date: N/A  Evaluation Date: 1/18/2024  Insurance Authorization Period Expiration: 12/17/2024  Plan of Care Certification Period: 1/18/2024 to 3/18/2024  Date of Return to MD: Not scheduled  Visit # / Visits authorized: 1 / 1  FOTO: 1/Intake score: 61    Precautions:  Standard    Time In:11:05 am  Time Out: 12:05 pm  Total Appointment Time (timed & untimed codes): 60 minutes    Subjective      Date of Onset: 9/28/2023    History of Current Condition/Mechanism of Injury: Doris reports: she was having dizziness prior to having the stroke.  She states that she drove herself  to the ER and was told that she had a lacuner stroke. She stayed 1 night and was released. Pt reports that she has difficulty putting her thoughts together and hand weakness. Pt reports that she saw a speech therapist but was not pleased with her.     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     Current Functional Status   Home/Living environment: lives alone    - 2 story home, 3 steps to  "enter    - DME: hand rail      Limitation of Functional Status as follows:   ADLs/IADLs:     - Feeding: Independent    - Bathing: Independent    - Dressing/Grooming: difficulty buttoning    - Home Management: Independent    - Driving: Independent     Leisure: Works out 2 x weekly, reading bible    Difficulty writing and picking up small items    Patient's Goals for Therapy: "better hand strength and fine motor skills"    Past Medical History/Physical Systems Review:   Isela Giraldo  has a past medical history of Anxiety, Depression, and Osteoporosis.    Isela Giraldo  has a past surgical history that includes rectal fissure surgery.    Isela has a current medication list which includes the following prescription(s): albuterol, albuterol-ipratropium, alprazolam, alprazolam, aspirin, atorvastatin, bupropion, bupropion, clopidogrel, trazodone, and trazodone.    Review of patient's allergies indicates:   Allergen Reactions    Fosamax [alendronate]      Did not feel right    Iodine and iodide containing products     Ramelteon Other (See Comments)     Melatonen    Shrimp Hives     Hives and itching head to toe        Objective      Observation/Appearance:  Skin intact and no edema or atrophy noted in hands.    Edema. None noted.    Hand AROM.  WNL    Sensation:  Intact    Purdue Pegboard:  1 minute test    Right: 16 pieces  Left: 13 pieces    9 hole peg test:    Left: 30 seconds  Right: 25 seconds       Strength (Dyanmometer) and Pinch Strength (Pinch Gauge)  Measured in pounds and psi. Average of three trials.   1/18/2024 1/18/2024    Left Right   Rung II 37 48   Key Pinch 13 12   3pt Pinch 10 10   2pt Pinch 7 7         Intake Outcome Measure for FOTO Hand Survey    Therapist reviewed FOTO scores for Isela Giraldo on 1/18/2024.   FOTO report - see Media section or FOTO account episode details.    Intake Score: 61%       Treatment      Total Treatment time (time-based codes) separate from " Evaluation: 0 minutes    Patient Education and Home Exercises      Education provided:   -role of OT, goals for OT, scheduling/cancellations, insurance limitations with patient.  -Additional Education provided: yellow putty exercises, fine motor activities    Written Home Exercises Provided: yes.  Exercises were reviewed and Doris was able to demonstrate them prior to the end of the session.    Doris demonstrated good  understanding of the education provided.     Pt was advised to perform these exercises free of pain, and to stop performing them if pain occurs.    See EMR under Patient Instructions for exercises provided 1/18/2024.    Assessment      Isela Giraldo is a 73 y.o. female referred to outpatient occupational therapy and presents with a medical diagnosis of lacunar stroke and hand weakness. Will advance patient with strengthening and fine motor activities next session.   Following medical record review it is determined that pt will benefit from occupational therapy services in order to maximize pain free and/or functional use of bilateral hands. The following goals were discussed with the patient and patient is in agreement with them as to be addressed in the treatment plan. The patient's rehab potential is Good.     Anticipated barriers to occupational therapy: Pt reports memory and cognitive issues.    Plan of care discussed with patient: Yes  Patient's spiritual, cultural and educational needs considered and patient is agreeable to the plan of care and goals as stated below:     Medical Necessity is demonstrated by the following  Occupational Profile/History  Co-morbidities and personal factors that may impact the plan of care [x] LOW: Brief chart review  [] MODERATE: Expanded chart review   [] HIGH: Extensive chart review    Moderate / High Support Documentation: N/A     Examination  Performance deficits relating to physical, cognitive or psychosocial skills that result in activity limitations  and/or participation restrictions  [x] LOW: addressing 1-3 Performance deficits  [] MODERATE: 3-5 Performance deficits  [] HIGH: 5+ Performance deficits (please support below)    Moderate / High Support Documentation: N/A    Physical:  Muscle Power/Strength  Muscle Endurance   Strength  Pinch Strength  Fine Motor Coordination    Cognitive:  Attention  Sequencing  Memory  Emotional Control    Psychosocial:    No Deficits     Treatment Options [x] LOW: Limited options  [] MODERATE: Several options  [] HIGH: Multiple options      Decision Making/ Complexity Score: low       The following goals were discussed with the patient and patient is in agreement with them as to be addressed in the treatment plan.     Goals:   Short Term (4 weeks on 2/18/2024):  1)   Patient to be IND with HEP.  2)   Increase  strength 3 to 5  lbs. to grasp objects easier.  3)   Increase pinch 1-3 psis for opening containers.   4)   Pt to improve speed on Purdue Pegboard by placing 3 or more pieces within 1 minute on bilateral hands.  5)    Pt to report improved writing ability.     Long Term (by discharge):  1)   Patient to score at 71% or more on FOTO to demonstrate improved perception of functional bilateral hand use.  2)   Pt will return to prior level of function for ADLs and household management.    Plan     Plan of Care Certification: 1/18/2024 to 3/18/2024.     Outpatient Occupational Therapy 2 times weekly for 8 weeks to include the following interventions: Therapeutic exercises/activities., Strengthening, and Fine motor activities.    MAUDE Babin, CHT

## 2024-01-22 ENCOUNTER — CLINICAL SUPPORT (OUTPATIENT)
Dept: REHABILITATION | Facility: HOSPITAL | Age: 74
End: 2024-01-22
Attending: FAMILY MEDICINE
Payer: MEDICARE

## 2024-01-22 DIAGNOSIS — R29.898 WEAKNESS OF BOTH HANDS: Primary | ICD-10-CM

## 2024-01-22 PROCEDURE — 97110 THERAPEUTIC EXERCISES: CPT | Mod: PN

## 2024-01-22 NOTE — PROGRESS NOTES
Occupational Therapy Daily Treatment Note     Date: 1/22/2024  Name: Isela Giraldo  Clinic Number: [id number]    Therapy Diagnosis:   Encounter Diagnosis   Name Primary?    Weakness of both hands Yes     Physician: Merced Johnson MD  Physician Orders: Eval and Treat  Medical Diagnosis: I63.81 (ICD-10-CM) - Lacunar stroke R29.898 (ICD-10-CM) - Hand weakness  Surgical Procedure and Date: N/A  Evaluation Date: 1/18/2024  Insurance Authorization Period Expiration: 12/17/2024  Plan of Care Certification Period: 1/18/2024 to 3/18/2024  Date of Return to MD: Not scheduled  Visit # / Visits authorized: 2/20  FOTO: 1/Intake score: 61     Precautions:  Standard     Time In: 2:00 pm  Time Out: 2:53 pm  Total Appointment Time (timed & untimed codes): 53 minutes    Subjective     Pt reports: she was not compliant with home exercise program given last session. She states that her sister was in town from [place].  Response to previous treatment:unremarkable  Functional change: none noted    Pain: 0/10  Location: bilateral hands      Objective   Purdue Pegboard:  1 minute test     Right: 16 pieces  Left: 13 pieces     9 hole peg test:     Left: 30 seconds  Right: 25 seconds         Strength (Dyanmometer) and Pinch Strength (Pinch Gauge)  Measured in pounds and psi. Average of three trials.    1/18/2024 1/18/2024     Left Right   Rung II 37 48   Key Pinch 13 12   3pt Pinch 10 10   2pt Pinch 7 7          Doris received therapeutic exercises for bilateral hand for 53 minutes including:  -Purdue pegboard 1 row each with right and left hand  -hand gripper 3 rubber bands x 2' each  -red sponge with yellow cp  and place in container x 2 trials  - nesting marbles each hand x 1 trial each  -yellow flexbar smiles and frowns x 2' each  -yellow pw grasping x 2' each  -wrist 3 ways with 2 lb, 2 x 15 reps each hand      Home Exercises and Education Provided     Education provided:   - cont  HEP  - Progress towards goals: Good    Written Home Exercises Provided: Patient instructed to cont prior HEP.  Exercises were reviewed and Doris was able to demonstrate them prior to the end of the session.  Doris demonstrated good  understanding of the education provided.     See EMR under Patient Instructions for exercises provided prior visit.     Doris demonstrated good  understanding of the education provided.         Assessment   Isela Giraldo is a 73 y.o. female referred to outpatient occupational therapy and presents with a medical diagnosis of lacunar stroke and hand weakness. Pt reports that she was not compliant with her HEP because her sister was in from [place]. Advanced with fine motor and hand/wrist/forearm strengthening with no difficulty.   Pt would continue to benefit from skilled OT.      Doris is progressing well towards her goals and there are no updates to goals at this time. Pt prognosis is Excellent.     Pt will continue to benefit from skilled outpatient occupational therapy to address the deficits listed in the problem list on initial evaluation provide pt/family education and to maximize pt's level of independence in the home and community environment.     Anticipated barriers to occupational therapy: compliance with HEP, memory and cognitive issues    Pt's spiritual, cultural and educational needs considered and pt agreeable to plan of care and goals.    Goals:  Short Term (4 weeks on 2/18/2024):  1)   Patient to be IND with HEP. - progressing  2)   Increase  strength 3 to 5  lbs. to grasp objects easier.-progressing  3)   Increase pinch 1-3 psis for opening containers. -progressing  4)   Pt to improve speed on Purdue Pegboard by placing 3 or more pieces within 1 minute on bilateral hands.-progressing  5)    Pt to report improved writing ability. -progressing     Long Term (by discharge):  1)   Patient to score at 71% or more on FOTO to demonstrate improved perception of  functional bilateral hand use.-progressing  2)   Pt will return to prior level of function for ADLs and household management.-progressing    Plan   Plan of Care Certification: 1/18/2024 to 3/18/2024.      Outpatient Occupational Therapy per initial POC. Treatment to include the following interventions: Therapeutic exercises/activities., Strengthening, and Fine motor activities.     Discussed Plan of Care with patient: Yes  Updates/Grading for next session: Advance as tolerated      MAUDE Babin, CHT

## 2024-01-29 ENCOUNTER — CLINICAL SUPPORT (OUTPATIENT)
Dept: REHABILITATION | Facility: HOSPITAL | Age: 74
End: 2024-01-29
Payer: MEDICARE

## 2024-01-29 ENCOUNTER — LAB VISIT (OUTPATIENT)
Dept: LAB | Facility: HOSPITAL | Age: 74
End: 2024-01-29
Attending: FAMILY MEDICINE
Payer: MEDICARE

## 2024-01-29 DIAGNOSIS — R29.898 WEAKNESS OF BOTH HANDS: Primary | ICD-10-CM

## 2024-01-29 DIAGNOSIS — Z12.11 SPECIAL SCREENING FOR MALIGNANT NEOPLASMS, COLON: ICD-10-CM

## 2024-01-29 PROCEDURE — 97110 THERAPEUTIC EXERCISES: CPT | Mod: PN

## 2024-01-29 PROCEDURE — 82274 ASSAY TEST FOR BLOOD FECAL: CPT | Performed by: FAMILY MEDICINE

## 2024-01-29 NOTE — PROGRESS NOTES
Occupational Therapy Daily Treatment Note     Date: 1/29/2024  Name: Isela Giraldo  Clinic Number: [id number]    Therapy Diagnosis:   Encounter Diagnosis   Name Primary?    Weakness of both hands Yes     Physician: Merced Johnson MD  Physician Orders: Eval and Treat  Medical Diagnosis: I63.81 (ICD-10-CM) - Lacunar stroke R29.898 (ICD-10-CM) - Hand weakness  Surgical Procedure and Date: N/A  Evaluation Date: 1/18/2024  Insurance Authorization Period Expiration: 12/17/2024  Plan of Care Certification Period: 1/18/2024 to 3/18/2024  Date of Return to MD: Not scheduled  Visit # / Visits authorized: 3/20  FOTO: 1/Intake score: 61     Precautions:  Standard     Time In: 1:00 pm  Time Out: 2:00 pm  Total Appointment Time (timed & untimed codes): 60 minutes    Subjective     Pt reports: she was compliant with home exercise program given last session.   Response to previous treatment:unremarkable  Functional change: none noted    Pain: 0/10  Location: bilateral hands      Objective   Purdue Pegboard:  1 minute test     Right: 16 pieces  Left: 13 pieces     9 hole peg test:     Left: 30 seconds  Right: 25 seconds         Strength (Dyanmometer) and Pinch Strength (Pinch Gauge)  Measured in pounds and psi. Average of three trials.    1/18/2024 1/18/2024     Left Right   Rung II 37 48   Key Pinch 13 12   3pt Pinch 10 10   2pt Pinch 7 7          Doris received therapeutic exercises for bilateral hand for 60 minutes including:  -Purdue pegboard 1 row each with right and left hand  -hand gripper rung 3 x 2' each  -red sponge with yellow cp  and place in container x 2 trials  - nesting marbles each hand x 1 trial each  -yellow flexbar smiles and frowns x 2' each  -yellow pw grasping x 2' each  -wrist 3 ways with 2 lb, 2 x 15 reps each hand  -yellow theraputty: 2' molding, 2' grasping      Home Exercises and Education Provided     Education provided:   - cont HEP  - Progress towards  goals: Good    Written Home Exercises Provided: Patient instructed to cont prior HEP.  Exercises were reviewed and Doris was able to demonstrate them prior to the end of the session.  Doris demonstrated good  understanding of the education provided.     See EMR under Patient Instructions for exercises provided prior visit.     Doris demonstrated good  understanding of the education provided.         Assessment   Isela Giraldo is a 73 y.o. female referred to outpatient occupational therapy and presents with a medical diagnosis of lacunar stroke and hand weakness. Advanced hand/wrist/forearm strengthening with no difficulty.   Pt would continue to benefit from skilled OT.      Doris is progressing well towards her goals and there are no updates to goals at this time. Pt prognosis is Excellent.     Pt will continue to benefit from skilled outpatient occupational therapy to address the deficits listed in the problem list on initial evaluation provide pt/family education and to maximize pt's level of independence in the home and community environment.     Anticipated barriers to occupational therapy: compliance with HEP, memory and cognitive issues    Pt's spiritual, cultural and educational needs considered and pt agreeable to plan of care and goals.    Goals:  Short Term (4 weeks on 2/18/2024):  1)   Patient to be IND with HEP. - progressing  2)   Increase  strength 3 to 5  lbs. to grasp objects easier.-progressing  3)   Increase pinch 1-3 psis for opening containers. -progressing  4)   Pt to improve speed on Purdue Pegboard by placing 3 or more pieces within 1 minute on bilateral hands.-progressing  5)    Pt to report improved writing ability. -progressing     Long Term (by discharge):  1)   Patient to score at 71% or more on FOTO to demonstrate improved perception of functional bilateral hand use.-progressing  2)   Pt will return to prior level of function for ADLs and household  management.-progressing    Plan   Plan of Care Certification: 1/18/2024 to 3/18/2024.      Outpatient Occupational Therapy per initial POC. Treatment to include the following interventions: Therapeutic exercises/activities., Strengthening, and Fine motor activities.     Discussed Plan of Care with patient: Yes  Updates/Grading for next session: Advance as tolerated      MAUDE Babin, CHT

## 2024-01-30 LAB — HEMOCCULT STL QL IA: NEGATIVE

## 2024-02-02 ENCOUNTER — TELEPHONE (OUTPATIENT)
Dept: FAMILY MEDICINE | Facility: CLINIC | Age: 74
End: 2024-02-02
Payer: MEDICARE

## 2024-02-02 DIAGNOSIS — I63.81 LACUNAR STROKE: Primary | ICD-10-CM

## 2024-02-02 NOTE — TELEPHONE ENCOUNTER
----- Message from Rikki Stanley sent at 2/1/2024  4:34 PM CST -----  Regarding: advice  Type:  Needs Medical Advice    Who Called: pt    Best Call Back Number: [phone]      Additional Information: pt st that the ot said she needs to see an nuro dr terence hazel.  please call to discuss.

## 2024-02-08 ENCOUNTER — CLINICAL SUPPORT (OUTPATIENT)
Dept: REHABILITATION | Facility: HOSPITAL | Age: 74
End: 2024-02-08
Payer: MEDICARE

## 2024-02-08 DIAGNOSIS — R29.898 WEAKNESS OF BOTH HANDS: Primary | ICD-10-CM

## 2024-02-08 PROCEDURE — 97110 THERAPEUTIC EXERCISES: CPT | Mod: PN

## 2024-02-08 NOTE — PROGRESS NOTES
Occupational Therapy Daily Treatment Note     Date: 2/8/2024  Name: Isela Giraldo  Clinic Number: [id number]    Therapy Diagnosis:   No diagnosis found.    Physician: Merced Johnson MD  Physician Orders: Eval and Treat  Medical Diagnosis: I63.81 (ICD-10-CM) - Lacunar stroke R29.898 (ICD-10-CM) - Hand weakness  Surgical Procedure and Date: N/A  Evaluation Date: 1/18/2024  Insurance Authorization Period Expiration: 12/17/2024  Plan of Care Certification Period: 1/18/2024 to 3/18/2024  Date of Return to MD: Not scheduled  Visit # / Visits authorized: 4/20  FOTO: 1/Intake score: 61     Precautions:  Standard     Time In: 1:00 pm  Time Out: 2:00 pm  Total Appointment Time (timed & untimed codes): 60 minutes    Subjective     Pt reports: she was compliant with home exercise program given last session.   Response to previous treatment:unremarkable  Functional change: none noted    Pain: 0/10  Location: bilateral hands      Objective   Purdue Pegboard:  1 minute test 2/8/2024     Right: 19 pieces (+3)  Left: 15 pieces (+2)     9 hole peg test: 2/8/2024     Left: 26 seconds (-4 sec)  Right: 24 seconds (-1 sec)         Strength (Dyanmometer) and Pinch Strength (Pinch Gauge)  Measured in pounds and psi. Average of three trials.    1/18/2024 1/18/2024 2/8/2024 2/8/2024     Left Right Left Right   Rung II 37 48 43 50   Garvin Pinch 13 12 13 14   3pt Pinch 10 10 13 11   2pt Pinch 7 7 8 8          Doris received therapeutic exercises for bilateral hand for 60 minutes including:  -Purdue pegboard 1 row each with right and left hand  -hand gripper rung 3 x 2' each  -red sponge with red cp  and place in container x 2 trials  - nesting marbles each hand x 1 trial each  -yellow flexbar smiles and frowns x 2' each  -yellow pw grasping x 2' each  -wrist 3 ways with 2 lb, 2 x 15 reps each hand  -yellow theraputty: 2' molding, 2' grasping (at home)      Home Exercises and Education Provided      Education provided:   - cont HEP  - Progress towards goals: Good    Written Home Exercises Provided: Patient instructed to cont prior HEP.  Exercises were reviewed and Doris was able to demonstrate them prior to the end of the session.  Doris demonstrated good  understanding of the education provided.     See EMR under Patient Instructions for exercises provided prior visit.     Doris demonstrated good  understanding of the education provided.         Assessment   Isela Giraldo is a 73 y.o. female referred to outpatient occupational therapy and presents with a medical diagnosis of lacunar stroke and hand weakness. Pt's fine motor skills have improved.  and pinch strength have also improved. Continued hand/wrist/forearm strengthening with no difficulty.   Pt would continue to benefit from skilled OT.      Doris is progressing well towards her goals and there are no updates to goals at this time. Pt prognosis is Excellent.     Pt will continue to benefit from skilled outpatient occupational therapy to address the deficits listed in the problem list on initial evaluation provide pt/family education and to maximize pt's level of independence in the home and community environment.     Anticipated barriers to occupational therapy: compliance with HEP, memory and cognitive issues    Pt's spiritual, cultural and educational needs considered and pt agreeable to plan of care and goals.    Goals:  Short Term (4 weeks on 2/18/2024):  1)   Patient to be IND with HEP. -  Met 2/8/2024  2)   Increase  strength 3 to 5  lbs. to grasp objects easier.-progressing  3)   Increase pinch 1-3 psis for opening containers. - Met 2/8/2024  4)   Pt to improve speed on Purdue Pegboard by placing 3 or more pieces within 1 minute on bilateral hands.-progressing  5)    Pt to report improved writing ability. -progressing     Long Term (by discharge):  1)   Patient to score at 71% or more on FOTO to demonstrate improved  perception of functional bilateral hand use.-progressing  2)   Pt will return to prior level of function for ADLs and household management.-progressing    Plan   Plan of Care Certification: 1/18/2024 to 3/18/2024.      Outpatient Occupational Therapy per initial POC. Treatment to include the following interventions: Therapeutic exercises/activities., Strengthening, and Fine motor activities.     Discussed Plan of Care with patient: Yes  Updates/Grading for next session: Advance as tolerated      MAUDE Babin, CHT

## 2024-02-12 ENCOUNTER — CLINICAL SUPPORT (OUTPATIENT)
Dept: REHABILITATION | Facility: HOSPITAL | Age: 74
End: 2024-02-12
Payer: MEDICARE

## 2024-02-12 DIAGNOSIS — R29.898 WEAKNESS OF BOTH HANDS: Primary | ICD-10-CM

## 2024-02-12 PROCEDURE — 97110 THERAPEUTIC EXERCISES: CPT | Mod: PN

## 2024-02-12 NOTE — PROGRESS NOTES
Occupational Therapy Daily Treatment Note     Date: 2/12/2024  Name: Isela Giraldo  Clinic Number: [id number]    Therapy Diagnosis:   Encounter Diagnosis   Name Primary?    Weakness of both hands Yes       Physician: Merced Johnson MD  Physician Orders: Eval and Treat  Medical Diagnosis: I63.81 (ICD-10-CM) - Lacunar stroke R29.898 (ICD-10-CM) - Hand weakness  Surgical Procedure and Date: N/A  Evaluation Date: 1/18/2024  Insurance Authorization Period Expiration: 12/17/2024  Plan of Care Certification Period: 1/18/2024 to 3/18/2024  Date of Return to MD: Not scheduled  Visit # / Visits authorized: 5/20  FOTO: 1/Intake score: 61 2/Score: 67     Precautions:  Standard     Time In: 1:00 pm  Time Out: 2:00 pm  Total Appointment Time (timed & untimed codes): 60 minutes    Subjective     Pt reports: she was compliant with home exercise program given last session.   Response to previous treatment:unremarkable  Functional change: none noted    Pain: 0/10  Location: bilateral hands      Objective   Purdue Pegboard:  1 minute test 2/8/2024     Right: 19 pieces (+3)  Left: 15 pieces (+2)     9 hole peg test: 2/8/2024     Left: 26 seconds (-4 sec)  Right: 24 seconds (-1 sec)         Strength (Dyanmometer) and Pinch Strength (Pinch Gauge)  Measured in pounds and psi. Average of three trials.    1/18/2024 1/18/2024 2/8/2024 2/8/2024     Left Right Left Right   Rung II 37 48 43 50   Garvin Pinch 13 12 13 14   3pt Pinch 10 10 13 11   2pt Pinch 7 7 8 8          Doris received therapeutic exercises for bilateral hand for 60 minutes including:  -Purdue pegboard 1 row each with right and left hand  - coins, paper clips with right and left hand  -hand gripper rung 3 x 2' each  -red sponge with red cp  and place in container x 2 trials  - nesting marbles each hand x 1 trial each  -yellow flexbar smiles and frowns x 2' each  -yellow pw grasping x 2' each  -wrist 3 ways with 2 lb, 2 x 15  reps each hand  -yellow theraputty: 2' molding, 2' grasping (at home)      Home Exercises and Education Provided     Education provided:   - cont HEP  - Progress towards goals: Good    Written Home Exercises Provided: Patient instructed to cont prior HEP.  Exercises were reviewed and Doirs was able to demonstrate them prior to the end of the session.  Doris demonstrated good  understanding of the education provided.     See EMR under Patient Instructions for exercises provided prior visit.     Doris demonstrated good  understanding of the education provided.         Assessment   Isela Giraldo is a 73 y.o. female referred to outpatient occupational therapy and presents with a medical diagnosis of lacunar stroke and hand weakness.  Continued hand/wrist/forearm strengthening and fine motor activities with mild difficulty noticed with sequencing activities. Improvement was noted on her FOTO score by 6 points.  Pt would continue to benefit from skilled OT.      Doris is progressing well towards her goals and there are no updates to goals at this time. Pt prognosis is Excellent.     Pt will continue to benefit from skilled outpatient occupational therapy to address the deficits listed in the problem list on initial evaluation provide pt/family education and to maximize pt's level of independence in the home and community environment.     Anticipated barriers to occupational therapy: compliance with HEP, memory and cognitive issues    Pt's spiritual, cultural and educational needs considered and pt agreeable to plan of care and goals.    Goals:  Short Term (4 weeks on 2/18/2024):  1)   Patient to be IND with HEP. -  Met 2/8/2024  2)   Increase  strength 3 to 5  lbs. to grasp objects easier.-progressing  3)   Increase pinch 1-3 psis for opening containers. - Met 2/8/2024  4)   Pt to improve speed on Purdue Pegboard by placing 3 or more pieces within 1 minute on bilateral hands.-progressing  5)    Pt to report  improved writing ability. -progressing     Long Term (by discharge):  1)   Patient to score at 71% or more on FOTO to demonstrate improved perception of functional bilateral hand use.-progressing  2)   Pt will return to prior level of function for ADLs and household management.-progressing    Plan   Plan of Care Certification: 1/18/2024 to 3/18/2024.      Outpatient Occupational Therapy per initial POC. Treatment to include the following interventions: Therapeutic exercises/activities., Strengthening, and Fine motor activities.     Discussed Plan of Care with patient: Yes  Updates/Grading for next session: Advance as tolerated      MAUDE Babin, CHT

## 2024-02-20 ENCOUNTER — CLINICAL SUPPORT (OUTPATIENT)
Dept: REHABILITATION | Facility: HOSPITAL | Age: 74
End: 2024-02-20
Payer: MEDICARE

## 2024-02-20 DIAGNOSIS — R29.898 WEAKNESS OF BOTH HANDS: Primary | ICD-10-CM

## 2024-02-20 PROCEDURE — 97110 THERAPEUTIC EXERCISES: CPT | Mod: PN

## 2024-02-20 NOTE — PROGRESS NOTES
Occupational Therapy Daily Treatment Note     Date: 2/20/2024  Name: Isela Giraldo  Clinic Number: [id number]    Therapy Diagnosis:   Encounter Diagnosis   Name Primary?    Weakness of both hands Yes       Physician: Merced Johnson MD  Physician Orders: Eval and Treat  Medical Diagnosis: I63.81 (ICD-10-CM) - Lacunar stroke R29.898 (ICD-10-CM) - Hand weakness  Surgical Procedure and Date: N/A  Evaluation Date: 1/18/2024  Insurance Authorization Period Expiration: 12/17/2024  Plan of Care Certification Period: 1/18/2024 to 3/18/2024  Date of Return to MD: Not scheduled  Visit # / Visits authorized: 6/20  FOTO: 1/Intake score: 61 2/Score: 67     Precautions:  Standard     Time In: 1:15 pm  Time Out: 2:00 pm  Total Appointment Time (timed & untimed codes):45 minutes    Subjective     Pt reports: she was compliant with home exercise program given last session.   Response to previous treatment:unremarkable  Functional change: none noted    Pain: 0/10  Location: bilateral hands      Objective   Purdue Pegboard:  1 minute test 2/8/2024     Right: 19 pieces (+3)  Left: 15 pieces (+2)     9 hole peg test: 2/8/2024     Left: 26 seconds (-4 sec)  Right: 24 seconds (-1 sec)         Strength (Dyanmometer) and Pinch Strength (Pinch Gauge)  Measured in pounds and psi. Average of three trials.    1/18/2024 1/18/2024 2/8/2024 2/8/2024     Left Right Left Right   Rung II 37 48 43 50   Garvin Pinch 13 12 13 14   3pt Pinch 10 10 13 11   2pt Pinch 7 7 8 8          Doris received therapeutic exercises for bilateral hand for 45 minutes including:  -Purdue pegboard 1 row each with right and left hand  - coins, paper clips with right and left hand (NT)  -hand gripper rung 3 x 2' each  -red sponge with red cp  and place in container x 2 trials  - nesting marbles each hand x 1 trial each  -red flexbar smiles and frowns x 2' each  -red pw grasping x 2' each  -wrist 3 ways with 1 lb x 1'  each  -yellow theraputty: 2' molding, 2' grasping (at home)      Home Exercises and Education Provided     Education provided:   - cont HEP  - Progress towards goals: Good    Written Home Exercises Provided: Patient instructed to cont prior HEP.  Exercises were reviewed and Doris was able to demonstrate them prior to the end of the session.  Doris demonstrated good  understanding of the education provided.     See EMR under Patient Instructions for exercises provided prior visit.     Doris demonstrated good  understanding of the education provided.         Assessment   Isela Doris Giraldo is a 73 y.o. female referred to outpatient occupational therapy and presents with a medical diagnosis of lacunar stroke and hand weakness.  Continued hand/wrist/forearm strengthening and fine motor activities with mild difficulty noticed with sequencing activities.   Pt would continue to benefit from skilled OT.      Doris is progressing well towards her goals and there are no updates to goals at this time. Pt prognosis is Excellent.     Pt will continue to benefit from skilled outpatient occupational therapy to address the deficits listed in the problem list on initial evaluation provide pt/family education and to maximize pt's level of independence in the home and community environment.     Anticipated barriers to occupational therapy: compliance with HEP, memory and cognitive issues    Pt's spiritual, cultural and educational needs considered and pt agreeable to plan of care and goals.    Goals:  Short Term (4 weeks on 2/18/2024):  1)   Patient to be IND with HEP. -  Met 2/8/2024  2)   Increase  strength 3 to 5  lbs. to grasp objects easier.-progressing  3)   Increase pinch 1-3 psis for opening containers. - Met 2/8/2024  4)   Pt to improve speed on Purdue Pegboard by placing 3 or more pieces within 1 minute on bilateral hands.-progressing  5)    Pt to report improved writing ability. -progressing     Long Term (by  discharge):  1)   Patient to score at 71% or more on FOTO to demonstrate improved perception of functional bilateral hand use.-progressing  2)   Pt will return to prior level of function for ADLs and household management.-progressing    Plan   Plan of Care Certification: 1/18/2024 to 3/18/2024.      Outpatient Occupational Therapy per initial POC. Treatment to include the following interventions: Therapeutic exercises/activities., Strengthening, and Fine motor activities.     Discussed Plan of Care with patient: Yes  Updates/Grading for next session: Advance as tolerated      MAUDE Babin, CHT

## 2024-02-21 PROBLEM — R00.1 BRADYCARDIA: Status: ACTIVE | Noted: 2024-02-21

## 2024-02-21 PROBLEM — I25.10 CORONARY ARTERIOSCLEROSIS: Status: ACTIVE | Noted: 2024-02-21

## 2024-02-21 PROBLEM — E78.00 HYPERCHOLESTEROLEMIA: Status: ACTIVE | Noted: 2024-02-21

## 2024-02-27 DIAGNOSIS — Z00.00 ENCOUNTER FOR MEDICARE ANNUAL WELLNESS EXAM: ICD-10-CM

## 2024-03-13 NOTE — TELEPHONE ENCOUNTER
Reviewed recs with patient for pulm review.  Please mail information for her to schedule with pulm as she is travelling currently. Referral prev done.   Body Location Override (Optional - Billing Will Still Be Based On Selected Body Map Location If Applicable): right posterior vertex scalp Detail Level: Detailed Lesion Type: Abscess Method: 15 blade Curette: No Anesthesia Type: 1% lidocaine with epinephrine Anesthesia Volume In Cc: 1 Size Of Lesion In Cm (Optional But May Be Required For Some Insurances): 0 Wound Care: Petrolatum Dressing: dry sterile dressing Epidermal Sutures: 4-0 Ethilon Epidermal Closure: simple interrupted Suture Text: The incision was partially closed with Preparation Text: The area was prepped in the usual clean fashion. Curette Text (Optional): After the contents were expressed a curette was used to partially remove the cyst wall. Consent was obtained and risks were reviewed including but not limited to delayed wound healing, infection, need for multiple I and D's, and pain. Post-Care Instructions: I reviewed with the patient in detail post-care instructions. Patient should keep wound covered and call the office should any redness, pain, swelling or worsening occur.

## 2024-03-26 DIAGNOSIS — F41.9 ANXIETY: ICD-10-CM

## 2024-03-27 ENCOUNTER — LAB VISIT (OUTPATIENT)
Dept: LAB | Facility: HOSPITAL | Age: 74
End: 2024-03-27
Attending: FAMILY MEDICINE
Payer: MEDICARE

## 2024-03-27 DIAGNOSIS — E55.9 VITAMIN D DEFICIENCY: ICD-10-CM

## 2024-03-27 LAB — 25(OH)D3+25(OH)D2 SERPL-MCNC: 83 NG/ML (ref 30–96)

## 2024-03-27 PROCEDURE — 82306 VITAMIN D 25 HYDROXY: CPT | Performed by: FAMILY MEDICINE

## 2024-03-27 PROCEDURE — 36415 COLL VENOUS BLD VENIPUNCTURE: CPT | Mod: PN | Performed by: FAMILY MEDICINE

## 2024-03-27 NOTE — TELEPHONE ENCOUNTER
Refill Routing Note   Medication(s) are not appropriate for processing by [facility] for the following reason(s):        ED/Hospital Visit since last OV with provider  Required vitals abnormal    [facility] action(s):  Defer             Appointments  past 12m or future 3m with PCP    Date Provider   Last Visit   12/18/2023 Merced Johnson MD   Next Visit   Visit date not found Merced Johnson MD   ED visits in past 90 days: 1        Note composed:9:26 AM 03/27/2024

## 2024-03-27 NOTE — TELEPHONE ENCOUNTER
No care due was identified.  [facility] Embedded Care Due Messages. Reference number: [id number].   3/27/2024 9:05:41 AM CDT

## 2024-03-28 RX ORDER — BUPROPION HYDROCHLORIDE 300 MG/1
300 TABLET ORAL DAILY
Qty: 90 TABLET | Refills: 1 | Status: SHIPPED | OUTPATIENT
Start: 2024-03-28

## 2024-03-28 RX ORDER — BUPROPION HYDROCHLORIDE 300 MG/1
300 TABLET ORAL
Qty: 90 TABLET | Refills: 3 | Status: SHIPPED | OUTPATIENT
Start: 2024-03-28

## 2024-03-28 NOTE — TELEPHONE ENCOUNTER
----- Message from Rhonda Sterling sent at 3/28/2024 11:21 AM CDT -----  Contact: self  Type:  RX Refill Request    Who Called: Pt  Refill or New Rx: Refill  RX Name and Strength: buPROPion (WELLBUTRIN XL) 300 MG 24 hr tablet  How is the patient currently taking it? (ex. 1XDay): as directed  Is this a 30 day or 90 day RX:   Preferred Pharmacy with phone number:   [address]  [facility] [address]  Phone: [phone] Fax: [phone]  Local or Mail Order: Local  Ordering Provider: Dr. Alex Orlando Call Back Number: [phone]  Thank you...

## 2024-03-28 NOTE — TELEPHONE ENCOUNTER
No care due was identified.  [facility] Embedded Care Due Messages. Reference number: [id number].   3/28/2024 11:42:53 AM CDT

## 2024-06-05 DIAGNOSIS — G47.00 INSOMNIA, UNSPECIFIED TYPE: ICD-10-CM

## 2024-06-05 DIAGNOSIS — F41.9 ANXIETY: ICD-10-CM

## 2024-06-05 NOTE — TELEPHONE ENCOUNTER
Called pt, scheduled appointment for 06/21/24 with Dr. Johnson pt stated she might call back if that day don't work. She didn't have her calendar with her she was at physical therapy.

## 2024-06-05 NOTE — TELEPHONE ENCOUNTER
No care due was identified.  [facility] Embedded Care Due Messages. Reference number: [id number].   6/05/2024 12:14:46 PM CDT

## 2024-06-05 NOTE — TELEPHONE ENCOUNTER
Please approve for ALPRAZolam (XANAX) 0.25 MG tablet   Last OV 12/18/2023  Last refill date 01/16/2024  Last labs 03/27/24  Next appt 06/21/24  Please approve for traZODone (DESYREL) 150 MG tablet   Last OV 12/18/2023  Last refill date 05/15/2023  Last labs 03/27/24  Next appt 06/21/24

## 2024-06-06 RX ORDER — TRAZODONE HYDROCHLORIDE 150 MG/1
150 TABLET ORAL NIGHTLY
Qty: 30 TABLET | Refills: 0 | Status: SHIPPED | OUTPATIENT
Start: 2024-06-06

## 2024-06-06 RX ORDER — ALPRAZOLAM 0.25 MG/1
TABLET ORAL
Qty: 30 TABLET | Refills: 0 | Status: SHIPPED | OUTPATIENT
Start: 2024-06-06

## 2024-06-21 ENCOUNTER — OFFICE VISIT (OUTPATIENT)
Dept: FAMILY MEDICINE | Facility: CLINIC | Age: 74
End: 2024-06-21
Payer: MEDICARE

## 2024-06-21 VITALS
HEART RATE: 76 BPM | SYSTOLIC BLOOD PRESSURE: 132 MMHG | WEIGHT: 108 LBS | OXYGEN SATURATION: 97 % | DIASTOLIC BLOOD PRESSURE: 78 MMHG | HEIGHT: 61 IN | BODY MASS INDEX: 20.39 KG/M2

## 2024-06-21 DIAGNOSIS — G47.00 INSOMNIA, UNSPECIFIED TYPE: ICD-10-CM

## 2024-06-21 DIAGNOSIS — E78.2 MIXED HYPERLIPIDEMIA: ICD-10-CM

## 2024-06-21 DIAGNOSIS — I63.81 LACUNAR INFARCT, ACUTE: ICD-10-CM

## 2024-06-21 DIAGNOSIS — R42 DIZZINESS: Primary | ICD-10-CM

## 2024-06-21 DIAGNOSIS — F41.9 ANXIETY: ICD-10-CM

## 2024-06-21 DIAGNOSIS — J02.9 SORE THROAT: ICD-10-CM

## 2024-06-21 PROBLEM — E78.00 HYPERCHOLESTEROLEMIA: Status: RESOLVED | Noted: 2024-02-21 | Resolved: 2024-06-21

## 2024-06-21 PROBLEM — R00.1 BRADYCARDIA: Status: RESOLVED | Noted: 2024-02-21 | Resolved: 2024-06-21

## 2024-06-21 PROCEDURE — 99213 OFFICE O/P EST LOW 20 MIN: CPT | Mod: PBBFAC,PN | Performed by: FAMILY MEDICINE

## 2024-06-21 PROCEDURE — 99999 PR PBB SHADOW E&M-EST. PATIENT-LVL III: CPT | Mod: PBBFAC,,, | Performed by: FAMILY MEDICINE

## 2024-06-21 RX ORDER — BUPROPION HYDROCHLORIDE 300 MG/1
300 TABLET ORAL DAILY
Qty: 90 TABLET | Refills: 3 | Status: SHIPPED | OUTPATIENT
Start: 2024-06-21

## 2024-06-21 RX ORDER — TRAZODONE HYDROCHLORIDE 150 MG/1
150 TABLET ORAL NIGHTLY
Qty: 90 TABLET | Refills: 3 | Status: SHIPPED | OUTPATIENT
Start: 2024-06-21

## 2024-06-21 RX ORDER — ALPRAZOLAM 0.25 MG/1
TABLET ORAL
Qty: 30 TABLET | Refills: 3 | Status: SHIPPED | OUTPATIENT
Start: 2024-06-21

## 2024-06-21 NOTE — PROGRESS NOTES
"Subjective:       Patient ID: Isela Giraldo is a 73 y.o. female.    Chief Complaint: Follow-up (Med check)    Follow-up  Associated symptoms include fatigue and a sore throat. Pertinent negatives include no congestion or coughing.     Patient seen for f/u.   C/o dizziness x 1mo, not sure if it's related to allergy/sinus sx. Recalls she was seen in  and given augmentin, pepcid, prednisone. She felt worse on that regimen and discontinued.  then started doxycycline which she finished. She feels better some days, but finds that she feels worse with weather change. Has also had sore throat and some difficulty swallowing.   Claritin BID seems to have helped with dizziness sx in conjunction with xlear nasal spray. Mild dizziness today. She does note that sx are better when she's lying in bed.    Did not find xyzal/zyrtec effective.     States that she stopped the asa/plavix and statin almost immediately after starting them. She is more concerned by the risks of the medications than a risk of recurrent stroke.     Review of Systems:  Review of Systems   Constitutional:  Positive for fatigue. Negative for unexpected weight change.   HENT:  Positive for sore throat and trouble swallowing. Negative for congestion, ear pain, hearing loss, postnasal drip, sinus pain and tinnitus.    Eyes:  Negative for photophobia and visual disturbance.   Respiratory:  Negative for cough and shortness of breath.        Objective:     Vitals:    06/21/24 1259   BP: 132/78   Pulse: 76   SpO2: 97%   Weight: 49 kg (108 lb)   Height: 5' 1" (1.549 m)          Physical Exam  Vitals and nursing note reviewed.   Constitutional:       General: She is not in acute distress.     Appearance: Normal appearance. She is well-developed.   HENT:      Head: Normocephalic and atraumatic.      Right Ear: Tympanic membrane normal. There is no impacted cerumen.      Left Ear: Tympanic membrane normal. There is no impacted cerumen.      Nose: No " congestion or rhinorrhea.   Eyes:      General: No scleral icterus.        Right eye: No discharge.         Left eye: No discharge.      Conjunctiva/sclera: Conjunctivae normal.   Cardiovascular:      Rate and Rhythm: Normal rate and regular rhythm.   Pulmonary:      Effort: Pulmonary effort is normal. No respiratory distress.      Breath sounds: Normal breath sounds.   Abdominal:      Palpations: Abdomen is soft.      Tenderness: There is no guarding.   Musculoskeletal:         General: No deformity or signs of injury.      Cervical back: Neck supple. Tenderness (anterior cervical tenderness to palp under the jaw) present.   Lymphadenopathy:      Cervical: No cervical adenopathy.   Skin:     General: Skin is warm and dry.      Findings: No rash.   Neurological:      General: No focal deficit present.      Mental Status: She is alert and oriented to person, place, and time.   Psychiatric:         Mood and Affect: Mood normal.         Behavior: Behavior normal.           Assessment & Plan:  Dizziness  -     Ambulatory referral/consult to ENT; Future; Expected date: 06/28/2024    Anxiety  Comments:  continue xanax prn  trial of generic wellbutrin  Orders:  -     ALPRAZolam (XANAX) 0.25 MG tablet; TAKE 1 TABLET BY MOUTH NIGHTLY AS NEEDED FOR ANXIETY  Dispense: 30 tablet; Refill: 3  -     buPROPion (WELLBUTRIN XL) 300 MG 24 hr tablet; Take 1 tablet (300 mg total) by mouth once daily.  Dispense: 90 tablet; Refill: 3    Insomnia, unspecified type  Comments:  ok to continue trazodone at current dose, 150mg daily  Orders:  -     traZODone (DESYREL) 150 MG tablet; Take 1 tablet (150 mg total) by mouth every evening.  Dispense: 90 tablet; Refill: 3    Sore throat  Comments:  needs review with ent  Orders:  -     Ambulatory referral/consult to ENT; Future; Expected date: 06/28/2024    Mixed hyperlipidemia    Lacunar infarct, acute  Comments:  last year - no longer on a/p/s

## 2024-07-23 ENCOUNTER — CLINICAL SUPPORT (OUTPATIENT)
Dept: AUDIOLOGY | Facility: CLINIC | Age: 74
End: 2024-07-23
Payer: MEDICARE

## 2024-07-23 ENCOUNTER — OFFICE VISIT (OUTPATIENT)
Dept: OTOLARYNGOLOGY | Facility: CLINIC | Age: 74
End: 2024-07-23
Payer: MEDICARE

## 2024-07-23 VITALS
HEIGHT: 61 IN | DIASTOLIC BLOOD PRESSURE: 68 MMHG | BODY MASS INDEX: 20.39 KG/M2 | SYSTOLIC BLOOD PRESSURE: 112 MMHG | WEIGHT: 108 LBS

## 2024-07-23 DIAGNOSIS — Z01.10 ENCOUNTER FOR HEARING EXAMINATION, UNSPECIFIED WHETHER ABNORMAL FINDINGS: Primary | ICD-10-CM

## 2024-07-23 DIAGNOSIS — J02.9 SORE THROAT: ICD-10-CM

## 2024-07-23 DIAGNOSIS — R42 DYSEQUILIBRIUM: Primary | ICD-10-CM

## 2024-07-23 DIAGNOSIS — R42 DIZZINESS: ICD-10-CM

## 2024-07-23 PROCEDURE — 99999 PR PBB SHADOW E&M-EST. PATIENT-LVL III: CPT | Mod: PBBFAC,,, | Performed by: NURSE PRACTITIONER

## 2024-07-23 PROCEDURE — 99213 OFFICE O/P EST LOW 20 MIN: CPT | Mod: S$PBB,,, | Performed by: NURSE PRACTITIONER

## 2024-07-23 PROCEDURE — 99999 PR PBB SHADOW E&M-EST. PATIENT-LVL I: CPT | Mod: PBBFAC,,, | Performed by: AUDIOLOGIST

## 2024-07-23 PROCEDURE — 99211 OFF/OP EST MAY X REQ PHY/QHP: CPT | Mod: PBBFAC,27,PO | Performed by: AUDIOLOGIST

## 2024-07-23 PROCEDURE — 99213 OFFICE O/P EST LOW 20 MIN: CPT | Mod: PBBFAC,PO | Performed by: NURSE PRACTITIONER

## 2024-07-23 NOTE — PROGRESS NOTES
Subjective     Patient ID: Isela Giraldo is a 73 y.o. female.    Chief Complaint: Dizziness (Occasional dizzy) and Other Misc (Had throat issues before but they have resolved)    HPI  Patient last saw me in 05/05/2021 for sinus pressure-headaches and dysequilibrium. CT sinus was negative for sinusitis involvement; recommended our [facility] next. No neurology notes available. All recent imaging has also been negative for sinusitis involvement. Patient returns today for lump in throat sensation, now resolved. Patient states she had a virus that lasted 6 weeks with swollen glands and lump in throat sensation, but states all symptoms have completely resolved now. Patient states dizzy spells only occur with weather changes and are managed with OTC allergy/sinus meds. No vertigo.     Review of Systems   Constitutional: Negative.    HENT: Negative.     Eyes: Negative.    Respiratory: Negative.     Cardiovascular: Negative.    Gastrointestinal: Negative.    Musculoskeletal: Negative.    Integumentary:  Negative.   Neurological:  Positive for dizziness.   Hematological: Negative.    Psychiatric/Behavioral: Negative.          Objective     Physical Exam  Vitals and nursing note reviewed.   Constitutional:       General: She is not in acute distress.     Appearance: She is well-developed. She is not ill-appearing.   HENT:      Head: Normocephalic and atraumatic.      Right Ear: Hearing, tympanic membrane, ear canal and external ear normal. No middle ear effusion. Tympanic membrane is not erythematous.      Left Ear: Hearing, tympanic membrane, ear canal and external ear normal.  No middle ear effusion. Tympanic membrane is not erythematous.      Nose: Nose normal.   Eyes:      General: Lids are normal. No scleral icterus.        Right eye: No discharge.         Left eye: No discharge.   Neck:      Trachea: Trachea normal. No tracheal deviation.   Cardiovascular:      Rate and Rhythm: Normal rate.   Pulmonary:       Effort: Pulmonary effort is normal. No respiratory distress.      Breath sounds: No stridor. No wheezing.   Musculoskeletal:         General: Normal range of motion.      Cervical back: Normal range of motion and neck supple.   Skin:     General: Skin is warm and dry.   Neurological:      Mental Status: She is alert and oriented to person, place, and time.      Coordination: Coordination normal.      Gait: Gait normal.   Psychiatric:         Attention and Perception: Attention normal.         Mood and Affect: Mood normal.         Speech: Speech normal.         Behavior: Behavior normal. Behavior is cooperative.        Assessment and Plan     1. Dysequilibrium      Patient encouraged to return to clinic if symptoms return, and as needed for further ENT symptoms or concerns.          No follow-ups on file.

## 2024-07-23 NOTE — PROGRESS NOTES
The patient arrived to the audiology clinic. She reported she was supposed to be scheduled in the ENT clinic and not for an appointment in the audiology clinic. She reported she has excellent hearing and that she does not need a hearing test. In review of the chart, it looked as though the patient was referred to the ENT clinic and the audiology clinic due to dizziness. The patient reported she is not having dizziness anymore. She reported she needed to be seen in the ENT clinic due to a lump in her throat. Patient was able to be added to the ENT clinic schedule for today.

## 2024-10-11 ENCOUNTER — OFFICE VISIT (OUTPATIENT)
Dept: URGENT CARE | Facility: CLINIC | Age: 74
End: 2024-10-11
Payer: MEDICARE

## 2024-10-11 VITALS
WEIGHT: 112 LBS | BODY MASS INDEX: 21.14 KG/M2 | DIASTOLIC BLOOD PRESSURE: 70 MMHG | HEART RATE: 76 BPM | HEIGHT: 61 IN | TEMPERATURE: 98 F | SYSTOLIC BLOOD PRESSURE: 126 MMHG | OXYGEN SATURATION: 99 %

## 2024-10-11 DIAGNOSIS — R35.0 FREQUENCY OF URINATION: Primary | ICD-10-CM

## 2024-10-11 DIAGNOSIS — N30.01 ACUTE CYSTITIS WITH HEMATURIA: ICD-10-CM

## 2024-10-11 LAB
BILIRUBIN, UA POC OHS: NEGATIVE
BLOOD, UA POC OHS: ABNORMAL
CLARITY, UA POC OHS: ABNORMAL
COLOR, UA POC OHS: YELLOW
GLUCOSE, UA POC OHS: NEGATIVE
KETONES, UA POC OHS: NEGATIVE
LEUKOCYTES, UA POC OHS: ABNORMAL
NITRITE, UA POC OHS: NEGATIVE
PH, UA POC OHS: 6
PROTEIN, UA POC OHS: NEGATIVE
SPECIFIC GRAVITY, UA POC OHS: 1.01
UROBILINOGEN, UA POC OHS: 0.2

## 2024-10-11 RX ORDER — NITROFURANTOIN 25; 75 MG/1; MG/1
100 CAPSULE ORAL 2 TIMES DAILY
Qty: 14 CAPSULE | Refills: 0 | Status: SHIPPED | OUTPATIENT
Start: 2024-10-11 | End: 2024-10-18

## 2024-10-11 NOTE — PROGRESS NOTES
"Subjective:      Patient ID: Isela Giraldo is a 74 y.o. female.    Vitals:  height is 5' 1" (1.549 m) and weight is 50.8 kg (112 lb). Her oral temperature is 98.4 °F (36.9 °C). Her blood pressure is 126/70 and her pulse is 76. Her oxygen saturation is 99%.     Chief Complaint: Urinary Tract Infection    Pt. C/o frequency, sensation of fullness, tugging onset a few days ago. No medications taking currently for symptoms. Denies any pain.     Urinary Tract Infection   This is a new problem. The current episode started in the past 7 days. The problem has been gradually worsening. The quality of the pain is described as aching. The pain is at a severity of 0/10. The patient is experiencing no pain. There has been no fever. She is Not sexually active. There is No history of pyelonephritis. Associated symptoms include frequency and withholding. She has tried increased fluids for the symptoms. The treatment provided no relief.       Genitourinary:  Positive for frequency.      Objective:     Physical Exam   Constitutional: She is oriented to person, place, and time. She appears well-developed.   HENT:   Head: Normocephalic and atraumatic.   Ears:   Right Ear: External ear normal.   Left Ear: External ear normal.   Nose: Nose normal.   Mouth/Throat: Mucous membranes are normal.   Eyes: Conjunctivae and lids are normal.   Neck: Trachea normal. Neck supple.   Cardiovascular: Normal rate, regular rhythm and normal heart sounds.   Pulmonary/Chest: Effort normal and breath sounds normal. No respiratory distress.   Abdominal: Normal appearance and bowel sounds are normal. She exhibits no distension and no mass. Soft. flat abdomen There is no abdominal tenderness.   Musculoskeletal: Normal range of motion.         General: Normal range of motion.   Neurological: She is alert and oriented to person, place, and time. She has normal strength.   Skin: Skin is warm, dry, intact, not diaphoretic and not pale.   Psychiatric: Her " speech is normal and behavior is normal. Judgment and thought content normal.   Nursing note and vitals reviewed.      Assessment:     1. Frequency of urination    2. Acute cystitis with hematuria      Results for orders placed or performed in visit on 10/11/24   POCT Urinalysis(Instrument)    Collection Time: 10/11/24  2:51 PM   Result Value Ref Range    Color, POC UA Yellow Yellow, Straw, Colorless    Clarity, POC UA Slight Cloudy (A) Clear    Glucose, POC UA Negative Negative    Bilirubin, POC UA Negative Negative    Ketones, POC UA Negative Negative    Spec Grav POC UA 1.015 1.005 - 1.030    Blood, POC UA Moderate (A) Negative    pH, POC UA 6.0 5.0 - 8.0    Protein, POC UA Negative Negative    Urobilinogen, POC UA 0.2 <=1.0    Nitrite, POC UA Negative Negative    WBC, POC UA Large (A) Negative      Plan:       Frequency of urination  -     POCT Urinalysis(Instrument)  -     nitrofurantoin, macrocrystal-monohydrate, (MACROBID) 100 MG capsule; Take 1 capsule (100 mg total) by mouth 2 (two) times daily. for 7 days  Dispense: 14 capsule; Refill: 0    Acute cystitis with hematuria  -     nitrofurantoin, macrocrystal-monohydrate, (MACROBID) 100 MG capsule; Take 1 capsule (100 mg total) by mouth 2 (two) times daily. for 7 days  Dispense: 14 capsule; Refill: 0      Drink plenty of fluids.  AZO over the counter of needed  Tylenol/Ibuprofen if needed for fever or pain.  ED or any further complication

## 2024-11-05 ENCOUNTER — TELEPHONE (OUTPATIENT)
Dept: FAMILY MEDICINE | Facility: CLINIC | Age: 74
End: 2024-11-05
Payer: MEDICARE

## 2024-11-05 DIAGNOSIS — B37.31 VAGINA, CANDIDIASIS: Primary | ICD-10-CM

## 2024-11-05 RX ORDER — FLUCONAZOLE 150 MG/1
150 TABLET ORAL DAILY
Qty: 1 TABLET | Refills: 0 | Status: SHIPPED | OUTPATIENT
Start: 2024-11-05 | End: 2024-11-06

## 2024-11-05 NOTE — TELEPHONE ENCOUNTER
----- Message from Harper sent at 11/5/2024  9:24 AM CST -----  Regarding: Yeast Infection  Type:  RX Refill Request    Who Called: Isela   Refill or New Rx:Refill   RX Name and Strength:fluconazole (DIFLUCAN) 150 MG Tab  How is the patient currently taking it? (ex. 1XDay):  Is this a 30 day or 90 day RX:  Preferred Pharmacy with phone number:[address]   Phone: [phone]  Fax: [phone]    Local or Mail Order:Local  Ordering Provider:Elder   Would the patient rather a call back or a response via [facility]? Call back   Best Call Back Number:[phone]  Additional Information:

## 2024-11-05 NOTE — TELEPHONE ENCOUNTER
Elder pt   Pt states she was on abx from the urgent care for a UTI. She is now suffering with a yeast infection. White discharge and itching. Can we send [facility]an [place] [facility] [place] [facility]

## 2024-11-18 ENCOUNTER — TELEPHONE (OUTPATIENT)
Dept: FAMILY MEDICINE | Facility: CLINIC | Age: 74
End: 2024-11-18
Payer: MEDICARE

## 2024-11-18 RX ORDER — FLUCONAZOLE 150 MG/1
150 TABLET ORAL
Qty: 2 TABLET | Refills: 0 | Status: SHIPPED | OUTPATIENT
Start: 2024-11-18 | End: 2024-11-22

## 2024-11-18 NOTE — TELEPHONE ENCOUNTER
----- Message from Nedra sent at 11/18/2024  1:51 PM CST -----  Contact: Self  Type: Needs Medical Advice  Who Called:  Patient  Symptoms (please be specific):  Yeast infection   How long has patient had these symptoms:  seems to be coming back  Pharmacy name and phone #:    [facility] - [place], [place] - [address]  [address] [facility] [address]  [facility] [address]  Phone: [phone] Fax: [phone]    Best Call Back Number: [phone]    Additional Information: Pt stated she was prescribed one tablet of the Diflucan and it started to work but it seems like it is returning, like it never went fully away. Can we please call in some more or call pt back to advise. Thank You.

## 2024-11-19 ENCOUNTER — TELEPHONE (OUTPATIENT)
Dept: FAMILY MEDICINE | Facility: CLINIC | Age: 74
End: 2024-11-19
Payer: MEDICARE

## 2024-11-19 NOTE — TELEPHONE ENCOUNTER
----- Message from Krystian sent at 11/18/2024  4:47 PM CST -----  Regarding: return call  Contact: patient  Type:  Patient Returning Call    Who Called:patient  Who Left Message for Patient:  Does the patient know what this is regarding?:returning office call  Would the patient rather a call back or a response via [facility]?   Best Call Back Number:[phone]  Additional Information:

## 2024-11-19 NOTE — TELEPHONE ENCOUNTER
Patient stated someone called her on yesterday and informed her that Diflucan was called in and said thank you.

## 2024-11-22 ENCOUNTER — OFFICE VISIT (OUTPATIENT)
Dept: URGENT CARE | Facility: CLINIC | Age: 74
End: 2024-11-22
Payer: MEDICARE

## 2024-11-22 VITALS
HEART RATE: 76 BPM | WEIGHT: 112 LBS | TEMPERATURE: 99 F | OXYGEN SATURATION: 97 % | DIASTOLIC BLOOD PRESSURE: 63 MMHG | HEIGHT: 61 IN | SYSTOLIC BLOOD PRESSURE: 114 MMHG | BODY MASS INDEX: 21.14 KG/M2 | RESPIRATION RATE: 20 BRPM

## 2024-11-22 DIAGNOSIS — R30.0 DYSURIA: Primary | ICD-10-CM

## 2024-11-22 DIAGNOSIS — R35.0 URINARY FREQUENCY: ICD-10-CM

## 2024-11-22 LAB
BILIRUBIN, UA POC OHS: NEGATIVE
BLOOD, UA POC OHS: ABNORMAL
CLARITY, UA POC OHS: CLEAR
COLOR, UA POC OHS: YELLOW
GLUCOSE, UA POC OHS: NEGATIVE
KETONES, UA POC OHS: ABNORMAL
LEUKOCYTES, UA POC OHS: NEGATIVE
NITRITE, UA POC OHS: NEGATIVE
PH, UA POC OHS: 7
PROTEIN, UA POC OHS: NEGATIVE
SPECIFIC GRAVITY, UA POC OHS: 1.01
UROBILINOGEN, UA POC OHS: 0.2

## 2024-11-22 PROCEDURE — 87086 URINE CULTURE/COLONY COUNT: CPT | Performed by: FAMILY MEDICINE

## 2024-11-22 RX ORDER — CEPHALEXIN 500 MG/1
500 CAPSULE ORAL EVERY 12 HOURS
Qty: 20 CAPSULE | Refills: 0 | Status: SHIPPED | OUTPATIENT
Start: 2024-11-22 | End: 2024-11-22

## 2024-11-22 RX ORDER — NITROFURANTOIN 25; 75 MG/1; MG/1
100 CAPSULE ORAL 2 TIMES DAILY
Qty: 10 CAPSULE | Refills: 0 | Status: SHIPPED | OUTPATIENT
Start: 2024-11-22 | End: 2024-11-27

## 2024-11-22 NOTE — PROGRESS NOTES
"Subjective:      Patient ID: Isela Giraldo is a 74 y.o. female.    Vitals:  height is 5' 1" (1.549 m) and weight is 50.8 kg (112 lb). Her oral temperature is 99.1 °F (37.3 °C). Her blood pressure is 114/63 and her pulse is 76. Her respiration is 20 and oxygen saturation is 97%.     Chief Complaint: Urinary Frequency    Patient presents today with complaints of urinary frequency and urgency x 4 days. Patient reports mild discomfort but mainly urgency. Patient has been taking Cranberry supplement OTC with no relief.     Urinary Frequency   Associated symptoms include frequency.       Genitourinary:  Positive for frequency.      Objective:     Physical Exam    Physical Exam  Vitals signs and nursing note reviewed.   Constitutional:       Appearance: Pt is well-developed. Alert, NAD.  Pt is cooperative.  Non-toxic appearance.  HENT:      Head: Normocephalic and atraumatic. .      Right Ear: External ear normal.      Left Ear: External ear normal.   Eyes:      General: Lids are normal.      Conjunctiva/sclera: Conjunctivae normal. Visual tracking is normal. Right eye exhibits no exudate. Left eye exhibits no exudate. No scleral icterus.     Pupils: Pupils are equal, round  Neck:      Musculoskeletal: range of motion without pain and neck supple.      Trachea: Trachea and phonation normal.   Throat: No apparent pharyngeal edema or swelling no tonsil swelling or asymmetry  Cardiovascular:      Rate and Rhythm: Normal Rhythm. Extremities well perfused.   Pulmonary:      Effort: Pulmonary effort is normal. No respiratory distress. NO stridor or difficulty breathing        Abdomen: NO obvious distention. No SP tenderness  Musculoskeletal: Normal range of motion. No ambulation issues  Skin:     General: Skin is warm and dry. No open wounds or abrasions. No petechiae No cyanosis  no jaundice not diaphoretic, not pale, not purpuric  Neurological:      Mental Status:Pt is alert and oriented to person, place, and time. "   Psychiatric:         Speech: Speech normal.         Behavior: Behavior normal.         Thought Content: Thought content normal.         Judgment: Judgment normal.       Assessment:     1. Dysuria    2. Urinary frequency        Plan:   Sent Keflex given recent use of macrobid. She feels that the keflex will not be effective. Pt would like to try macrobid again.      Dysuria  -     Urine culture    Urinary frequency  -     POCT Urinalysis(Instrument)    Other orders  -     cephALEXin (KEFLEX) 500 MG capsule; Take 1 capsule (500 mg total) by mouth every 12 (twelve) hours. for 10 days  Dispense: 20 capsule; Refill: 0

## 2024-11-23 LAB — BACTERIA UR CULT: NORMAL

## 2024-11-24 ENCOUNTER — TELEPHONE (OUTPATIENT)
Dept: URGENT CARE | Facility: CLINIC | Age: 74
End: 2024-11-24
Payer: MEDICARE

## 2024-12-24 DIAGNOSIS — F41.9 ANXIETY: ICD-10-CM

## 2024-12-27 RX ORDER — ALPRAZOLAM 0.25 MG/1
TABLET ORAL
Qty: 30 TABLET | Refills: 1 | Status: SHIPPED | OUTPATIENT
Start: 2024-12-27

## 2025-01-07 ENCOUNTER — HOSPITAL ENCOUNTER (OUTPATIENT)
Dept: RADIOLOGY | Facility: HOSPITAL | Age: 75
Discharge: HOME | End: 2025-01-07
Attending: FAMILY MEDICINE
Payer: MEDICARE

## 2025-01-07 DIAGNOSIS — Z12.31 ENCOUNTER FOR SCREENING MAMMOGRAM FOR BREAST CANCER: ICD-10-CM

## 2025-01-07 PROCEDURE — 77063 BREAST TOMOSYNTHESIS BI: CPT | Mod: 26,,, | Performed by: RADIOLOGY

## 2025-01-07 PROCEDURE — 77067 SCR MAMMO BI INCL CAD: CPT | Mod: 26,,, | Performed by: RADIOLOGY

## 2025-01-07 PROCEDURE — 77063 BREAST TOMOSYNTHESIS BI: CPT | Mod: TC,PO

## 2025-01-17 ENCOUNTER — OFFICE VISIT (OUTPATIENT)
Dept: URGENT CARE | Facility: CLINIC | Age: 75
End: 2025-01-17
Payer: MEDICARE

## 2025-01-17 VITALS
SYSTOLIC BLOOD PRESSURE: 132 MMHG | TEMPERATURE: 98 F | DIASTOLIC BLOOD PRESSURE: 71 MMHG | HEART RATE: 78 BPM | OXYGEN SATURATION: 98 % | RESPIRATION RATE: 18 BRPM | HEIGHT: 61 IN | BODY MASS INDEX: 21.14 KG/M2 | WEIGHT: 112 LBS

## 2025-01-17 DIAGNOSIS — R30.0 DYSURIA: Primary | ICD-10-CM

## 2025-01-17 LAB
BILIRUBIN, UA POC OHS: NEGATIVE
BLOOD, UA POC OHS: ABNORMAL
CLARITY, UA POC OHS: CLEAR
COLOR, UA POC OHS: YELLOW
GLUCOSE, UA POC OHS: NEGATIVE
KETONES, UA POC OHS: 40
LEUKOCYTES, UA POC OHS: NEGATIVE
NITRITE, UA POC OHS: NEGATIVE
PH, UA POC OHS: 7
PROTEIN, UA POC OHS: NEGATIVE
SPECIFIC GRAVITY, UA POC OHS: 1.02
UROBILINOGEN, UA POC OHS: 0.2

## 2025-01-17 PROCEDURE — 87086 URINE CULTURE/COLONY COUNT: CPT | Performed by: FAMILY MEDICINE

## 2025-01-17 PROCEDURE — 81003 URINALYSIS AUTO W/O SCOPE: CPT | Mod: QW,S$GLB,, | Performed by: FAMILY MEDICINE

## 2025-01-17 PROCEDURE — 99213 OFFICE O/P EST LOW 20 MIN: CPT | Mod: S$GLB,,, | Performed by: FAMILY MEDICINE

## 2025-01-17 RX ORDER — FLUCONAZOLE 150 MG/1
150 TABLET ORAL DAILY
Qty: 1 TABLET | Refills: 0 | Status: SHIPPED | OUTPATIENT
Start: 2025-01-17 | End: 2025-01-18

## 2025-01-17 RX ORDER — NITROFURANTOIN 25; 75 MG/1; MG/1
100 CAPSULE ORAL 2 TIMES DAILY
Qty: 10 CAPSULE | Refills: 0 | Status: SHIPPED | OUTPATIENT
Start: 2025-01-17 | End: 2025-01-22

## 2025-01-17 NOTE — PROGRESS NOTES
"Subjective:      Patient ID: Isela Giraldo is a 74 y.o. female.    Vitals:  height is 5' 1" (1.549 m) and weight is 50.8 kg (112 lb). Her oral temperature is 98.2 °F (36.8 °C). Her blood pressure is 132/71 and her pulse is 78. Her respiration is 18 and oxygen saturation is 98%.     Chief Complaint: Urinary Tract Infection    Patient c/o possible UTI onset last week. Pain scale 3/10    Urinary Tract Infection   This is a new problem. The problem has been unchanged. The quality of the pain is described as burning. The pain is at a severity of 2/10. The pain is mild. There has been no fever. Associated symptoms include frequency and urgency. Pertinent negatives include no behavior changes, chills, discharge, flank pain, hematuria, hesitancy, nausea, possible pregnancy, sweats, vomiting, weight loss, bubble bath use, constipation, rash or withholding. She has tried nothing for the symptoms.       Constitution: Negative for chills.   Gastrointestinal:  Negative for nausea, vomiting and constipation.   Genitourinary:  Positive for frequency and urgency. Negative for flank pain and hematuria.   Skin:  Negative for rash.      Objective:     Physical Exam    Physical Exam  Vitals signs and nursing note reviewed.   Constitutional:       Appearance: Pt is well-developed. Alert, NAD.  Pt is cooperative.  Non-toxic appearance.  HENT:      Head: Normocephalic and atraumatic. .      Right Ear: External ear normal.      Left Ear: External ear normal.   Eyes:      General: Lids are normal.      Conjunctiva/sclera: Conjunctivae normal. Visual tracking is normal. Right eye exhibits no exudate. Left eye exhibits no exudate. No scleral icterus.     Pupils: Pupils are equal, round  Neck:      Musculoskeletal: range of motion without pain and neck supple.      Trachea: Trachea and phonation normal.   Cardiovascular:      Rate and Rhythm: Normal Rhythm. Extremities well perfused.   Pulmonary:      Effort: Pulmonary effort is " normal. No respiratory distress. NO stridor or difficulty breathing   Abdomen: NO obvious distention.  Musculoskeletal: Normal range of motion. No ambulation issues  Skin:     General: Skin is warm and dry. No open wounds or abrasions. No petechiae No cyanosis  no jaundice not diaphoretic, not pale, not purpuric  Neurological:      Mental Status:Pt is alert and oriented to person, place, and time.   Psychiatric:         Speech: Speech normal.         Behavior: Behavior normal.         Thought Content: Thought content normal.         Judgment: Judgment normal.       Assessment:     1. Dysuria        Plan:       Dysuria  -     Ambulatory referral/consult to Gynecology  -     Urine Culture High Risk  -     POCT Urinalysis(Instrument)    Other orders  -     nitrofurantoin, macrocrystal-monohydrate, (MACROBID) 100 MG capsule; Take 1 capsule (100 mg total) by mouth 2 (two) times daily. for 5 days  Dispense: 10 capsule; Refill: 0  -     fluconazole (DIFLUCAN) 150 MG Tab; Take 1 tablet (150 mg total) by mouth once daily. for 1 day  Dispense: 1 tablet; Refill: 0

## 2025-01-18 LAB — BACTERIA UR CULT: NO GROWTH

## 2025-01-19 ENCOUNTER — TELEPHONE (OUTPATIENT)
Dept: URGENT CARE | Facility: CLINIC | Age: 75
End: 2025-01-19
Payer: MEDICARE

## 2025-01-28 ENCOUNTER — OFFICE VISIT (OUTPATIENT)
Dept: FAMILY MEDICINE | Facility: CLINIC | Age: 75
End: 2025-01-28
Payer: MEDICARE

## 2025-01-28 ENCOUNTER — LAB VISIT (OUTPATIENT)
Dept: LAB | Facility: HOSPITAL | Age: 75
End: 2025-01-28
Payer: MEDICARE

## 2025-01-28 VITALS
OXYGEN SATURATION: 98 % | WEIGHT: 112 LBS | HEIGHT: 61 IN | HEART RATE: 74 BPM | BODY MASS INDEX: 21.14 KG/M2 | DIASTOLIC BLOOD PRESSURE: 82 MMHG | SYSTOLIC BLOOD PRESSURE: 132 MMHG

## 2025-01-28 DIAGNOSIS — R39.9 UTI SYMPTOMS: ICD-10-CM

## 2025-01-28 DIAGNOSIS — N39.0 RECURRENT UTI: Primary | ICD-10-CM

## 2025-01-28 DIAGNOSIS — E55.9 VITAMIN D DEFICIENCY: ICD-10-CM

## 2025-01-28 LAB
25(OH)D3+25(OH)D2 SERPL-MCNC: 78 NG/ML (ref 30–96)
ALBUMIN SERPL BCP-MCNC: 3.8 G/DL (ref 3.5–5.2)
ALP SERPL-CCNC: 116 U/L (ref 40–150)
ALT SERPL W/O P-5'-P-CCNC: 23 U/L (ref 10–44)
ANION GAP SERPL CALC-SCNC: 10 MMOL/L (ref 8–16)
AST SERPL-CCNC: 25 U/L (ref 10–40)
BILIRUB SERPL-MCNC: 0.2 MG/DL (ref 0.1–1)
BILIRUBIN, UA POC OHS: NEGATIVE
BLOOD, UA POC OHS: NEGATIVE
BUN SERPL-MCNC: 28 MG/DL (ref 8–23)
CALCIUM SERPL-MCNC: 9.6 MG/DL (ref 8.7–10.5)
CHLORIDE SERPL-SCNC: 100 MMOL/L (ref 95–110)
CLARITY, UA POC OHS: CLEAR
CO2 SERPL-SCNC: 23 MMOL/L (ref 23–29)
COLOR, UA POC OHS: YELLOW
CREAT SERPL-MCNC: 1 MG/DL (ref 0.5–1.4)
EST. GFR  (NO RACE VARIABLE): 58.8 ML/MIN/1.73 M^2
GLUCOSE SERPL-MCNC: 93 MG/DL (ref 70–110)
GLUCOSE, UA POC OHS: NEGATIVE
KETONES, UA POC OHS: 15
LEUKOCYTES, UA POC OHS: NEGATIVE
NITRITE, UA POC OHS: NEGATIVE
PH, UA POC OHS: 7
POTASSIUM SERPL-SCNC: 4.7 MMOL/L (ref 3.5–5.1)
PROT SERPL-MCNC: 7.3 G/DL (ref 6–8.4)
PROTEIN, UA POC OHS: NEGATIVE
SODIUM SERPL-SCNC: 133 MMOL/L (ref 136–145)
SPECIFIC GRAVITY, UA POC OHS: 1.02
UROBILINOGEN, UA POC OHS: 0.2

## 2025-01-28 PROCEDURE — 36415 COLL VENOUS BLD VENIPUNCTURE: CPT | Mod: PN | Performed by: STUDENT IN AN ORGANIZED HEALTH CARE EDUCATION/TRAINING PROGRAM

## 2025-01-28 PROCEDURE — 80053 COMPREHEN METABOLIC PANEL: CPT | Performed by: STUDENT IN AN ORGANIZED HEALTH CARE EDUCATION/TRAINING PROGRAM

## 2025-01-28 PROCEDURE — 87086 URINE CULTURE/COLONY COUNT: CPT | Performed by: STUDENT IN AN ORGANIZED HEALTH CARE EDUCATION/TRAINING PROGRAM

## 2025-01-28 PROCEDURE — 82306 VITAMIN D 25 HYDROXY: CPT | Performed by: STUDENT IN AN ORGANIZED HEALTH CARE EDUCATION/TRAINING PROGRAM

## 2025-01-28 PROCEDURE — 99999 PR PBB SHADOW E&M-EST. PATIENT-LVL III: CPT | Mod: PBBFAC,,, | Performed by: STUDENT IN AN ORGANIZED HEALTH CARE EDUCATION/TRAINING PROGRAM

## 2025-01-28 PROCEDURE — 99213 OFFICE O/P EST LOW 20 MIN: CPT | Mod: PBBFAC,PN | Performed by: STUDENT IN AN ORGANIZED HEALTH CARE EDUCATION/TRAINING PROGRAM

## 2025-01-28 PROCEDURE — 99999PBSHW POCT URINALYSIS(INSTRUMENT): Mod: PBBFAC,,,

## 2025-01-28 PROCEDURE — 81003 URINALYSIS AUTO W/O SCOPE: CPT | Mod: PBBFAC,PN | Performed by: STUDENT IN AN ORGANIZED HEALTH CARE EDUCATION/TRAINING PROGRAM

## 2025-01-28 PROCEDURE — 99214 OFFICE O/P EST MOD 30 MIN: CPT | Mod: S$PBB,,, | Performed by: STUDENT IN AN ORGANIZED HEALTH CARE EDUCATION/TRAINING PROGRAM

## 2025-01-28 NOTE — PROGRESS NOTES
"Plan:     Isela Miramontes" was seen today for urinary tract infection.    Diagnoses and all orders for this visit:    Recurrent UTI  -     Ambulatory referral/consult to Urology; Future    UTI symptoms  -     Urine culture  -     POCT Urinalysis(Instrument)  -     Ambulatory referral/consult to Urology; Future    Vitamin D deficiency  -     Comprehensive Metabolic Panel; Future  -     Vitamin D; Future    Recommended patient take previously prescribed antibiotics. If symptoms do not improve, recommend Urology follow-up.    Follow up if symptoms worsen or fail to improve.    Rama Caldwell MD  01/28/2025    Subjective:     Patient ID: Isela Giraldo is a 74 y.o. female    Chief Complaint   Patient presents with    Urinary Tract Infection     Possible UTI, pt states she's had it for a while, pt said she is having a pressure/tugging feeling, and the sensation that she still have to urinate after she's already gone.       HPI  74 y.o. female with a PMHx as documented below presents to clinic today for the following:    Pt reports 10-14 day history of urinary frequency and pelvic pressure. Denies dysuria, fever, chills, nausea, and vomiting. No reported abnormal vaginal bleeding or discharge.    Recently seen at [facility] on 1/17/25 for same symptoms - Dx w/ UTI, prescribed Macrobid 100 mg BID x5 days and Fluconazole 150 mg daily x1. Urine culture ended up resulting as negative.    Today, pt states that she did not take the Macrobid or the Fluconazole.     Requests CMP and Vit D labs be checked. Declines lipid panel.    Current Outpatient Medications   Medication Instructions    albuterol 90 mcg/actuation inhaler 1 puff, Inhalation, Every 4 hours PRN    albuterol-ipratropium (DUO-NEB) 2.5 mg-0.5 mg/3 mL nebulizer solution 3 mLs, Nebulization, Every 6 hours PRN, Rescue    ALPRAZolam (XANAX) 0.25 MG tablet TAKE 1 TABLET BY MOUTH NIGHTLY AS NEEDED FOR ANXIETY    aspirin (ECOTRIN) 81 mg, Oral, Daily    " "atorvastatin (LIPITOR) 80 mg, Oral, Daily    buPROPion (WELLBUTRIN XL) 300 mg, Oral, Daily    clopidogreL (PLAVIX) 75 mg, Oral, Daily    HYDROcodone-acetaminophen (NORCO) 7.5-325 mg per tablet 1 tablet, Oral, Every 6 hours PRN    traZODone (DESYREL) 150 mg, Oral, Nightly      Past Medical History:   Diagnosis Date    Anxiety     Depression     Osteoporosis     recalls intolerant of fosamax with joint pain      ROS  ROS completed and negative unless otherwise mentioned above in HPI.    Objective:      Vitals:    01/28/25 1344   BP: 132/82   Pulse: 74   SpO2: 98%   Weight: 50.8 kg (112 lb)   Height: 5' 1" (1.549 m)     Body mass index is 21.16 kg/m².    Physical Exam   Constitutional:       General: No acute distress.  HENT:      Head: Normocephalic and atraumatic.   Pulmonary:      Effort: Pulmonary effort is normal. No respiratory distress.   Neurological:      General: No focal deficit present.      Mental Status: Alert and oriented to person, place, and time. Mental status is at baseline.    Assessment:       1. Recurrent UTI    2. UTI symptoms    3. Vitamin D deficiency        Rama Caldwell MD  [facility]  Office: [phone]   Fax: [phone]  01/28/2025      Disclaimer: This note was partly generated using dictation software which may occasionally result in transcription errors.    Total time spent on this encounter includes face to face time and non-face to face time preparing to see the patient (eg, review of tests), obtaining and/or reviewing separately obtained history, documenting clinical information in the electronic or other health record, independently interpreting results, and communicating results to the patient/family/caregiver, or care coordinator.    "

## 2025-01-30 LAB — BACTERIA UR CULT: NORMAL

## 2025-02-03 ENCOUNTER — TELEPHONE (OUTPATIENT)
Dept: URGENT CARE | Facility: CLINIC | Age: 75
End: 2025-02-03
Payer: MEDICARE

## 2025-02-03 NOTE — TELEPHONE ENCOUNTER
Called and spoke to patient. Made her aware she was originally contacted on 1/19/2025, but did not answer. Results given to patient today.      ----- Message from Becca sent at 1/27/2025  4:16 PM CST -----  Contact: [phone]  pt  .[id number]    Type: Test Results    What test was performed?  Cultures  Who ordered the test?[id number]    When and where were the test performed? 01/17/2025    [facility]    Would you like a call back and or thru [facility]:call back    Comments:Please call and advise

## 2025-02-22 DIAGNOSIS — Z00.00 ENCOUNTER FOR MEDICARE ANNUAL WELLNESS EXAM: ICD-10-CM

## 2025-02-28 ENCOUNTER — TELEPHONE (OUTPATIENT)
Dept: FAMILY MEDICINE | Facility: CLINIC | Age: 75
End: 2025-02-28
Payer: MEDICARE

## 2025-02-28 NOTE — TELEPHONE ENCOUNTER
----- Message from Deanna sent at 2/27/2025  2:05 PM CST -----  Contact: self  Type:  Needs Medical AdviceWho Called: selfSymptoms (please be specific): pt is needing a hosp f/u appt with  also she stated she needs a wellness visit wantd to knock thm both out at the same time Would the patient rather a call back or a response via [facility]? callBest Call Back Number: [phone] (home) Additional Information: please advise and thank you

## 2025-03-05 ENCOUNTER — TELEPHONE (OUTPATIENT)
Dept: FAMILY MEDICINE | Facility: CLINIC | Age: 75
End: 2025-03-05
Payer: MEDICARE

## 2025-03-05 DIAGNOSIS — R39.9 UTI SYMPTOMS: ICD-10-CM

## 2025-03-05 DIAGNOSIS — R79.9 ABNORMAL FINDING OF BLOOD CHEMISTRY, UNSPECIFIED: ICD-10-CM

## 2025-03-05 DIAGNOSIS — R68.89 OTHER GENERAL SYMPTOMS AND SIGNS: ICD-10-CM

## 2025-03-05 DIAGNOSIS — E78.1 PURE HYPERTRIGLYCERIDEMIA: Primary | ICD-10-CM

## 2025-03-05 DIAGNOSIS — Z00.00 PREVENTATIVE HEALTH CARE: ICD-10-CM

## 2025-03-05 DIAGNOSIS — R42 DIZZINESS: ICD-10-CM

## 2025-03-05 DIAGNOSIS — Z13.1 ENCOUNTER FOR SCREENING FOR DIABETES MELLITUS: ICD-10-CM

## 2025-03-05 DIAGNOSIS — E55.9 VITAMIN D DEFICIENCY: ICD-10-CM

## 2025-03-05 NOTE — TELEPHONE ENCOUNTER
----- Message from Fausto sent at 3/5/2025  9:06 AM CST -----  Contact: self  Type: Needs Medical AdviceWho Called:  PTBest Call Back Number: [phone] Additional Information: Please call PT regarding blood work orders before 03/10 appt.

## 2025-03-05 NOTE — TELEPHONE ENCOUNTER
Called pt get her scheduled for labs. Pt chose to come in tomorrow morning to get her labs done. Got pt scheduled to come in, pt said thanks and hung up.

## 2025-03-05 NOTE — TELEPHONE ENCOUNTER
Dr. Johnson Patient  Patient is asking for a full panel of labs to be placed so she can get them drawn before Monday, please add (urine), more if needed and sign orders.

## 2025-03-06 ENCOUNTER — LAB VISIT (OUTPATIENT)
Dept: LAB | Facility: HOSPITAL | Age: 75
End: 2025-03-06
Attending: STUDENT IN AN ORGANIZED HEALTH CARE EDUCATION/TRAINING PROGRAM
Payer: MEDICARE

## 2025-03-06 DIAGNOSIS — Z13.1 ENCOUNTER FOR SCREENING FOR DIABETES MELLITUS: ICD-10-CM

## 2025-03-06 DIAGNOSIS — Z00.00 PREVENTATIVE HEALTH CARE: ICD-10-CM

## 2025-03-06 DIAGNOSIS — R79.9 ABNORMAL FINDING OF BLOOD CHEMISTRY, UNSPECIFIED: ICD-10-CM

## 2025-03-06 DIAGNOSIS — R42 DIZZINESS: ICD-10-CM

## 2025-03-06 DIAGNOSIS — R68.89 OTHER GENERAL SYMPTOMS AND SIGNS: ICD-10-CM

## 2025-03-06 DIAGNOSIS — E78.1 PURE HYPERTRIGLYCERIDEMIA: ICD-10-CM

## 2025-03-06 DIAGNOSIS — E55.9 VITAMIN D DEFICIENCY: ICD-10-CM

## 2025-03-06 LAB
25(OH)D3+25(OH)D2 SERPL-MCNC: 79 NG/ML (ref 30–96)
BASOPHILS # BLD AUTO: 0.03 K/UL (ref 0–0.2)
BASOPHILS NFR BLD: 0.5 % (ref 0–1.9)
DIFFERENTIAL METHOD BLD: ABNORMAL
EOSINOPHIL # BLD AUTO: 0.1 K/UL (ref 0–0.5)
EOSINOPHIL NFR BLD: 2.5 % (ref 0–8)
ERYTHROCYTE [DISTWIDTH] IN BLOOD BY AUTOMATED COUNT: 12.6 % (ref 11.5–14.5)
ESTIMATED AVG GLUCOSE: 105 MG/DL (ref 68–131)
HBA1C MFR BLD: 5.3 % (ref 4–5.6)
HCT VFR BLD AUTO: 42.8 % (ref 37–48.5)
HGB BLD-MCNC: 14.2 G/DL (ref 12–16)
IMM GRANULOCYTES # BLD AUTO: 0.01 K/UL (ref 0–0.04)
IMM GRANULOCYTES NFR BLD AUTO: 0.2 % (ref 0–0.5)
LYMPHOCYTES # BLD AUTO: 1.7 K/UL (ref 1–4.8)
LYMPHOCYTES NFR BLD: 31.5 % (ref 18–48)
MCH RBC QN AUTO: 31.4 PG (ref 27–31)
MCHC RBC AUTO-ENTMCNC: 33.2 G/DL (ref 32–36)
MCV RBC AUTO: 95 FL (ref 82–98)
MONOCYTES # BLD AUTO: 0.5 K/UL (ref 0.3–1)
MONOCYTES NFR BLD: 8.7 % (ref 4–15)
NEUTROPHILS # BLD AUTO: 3.1 K/UL (ref 1.8–7.7)
NEUTROPHILS NFR BLD: 56.6 % (ref 38–73)
NRBC BLD-RTO: 0 /100 WBC
PLATELET # BLD AUTO: 333 K/UL (ref 150–450)
PMV BLD AUTO: 9.5 FL (ref 9.2–12.9)
RBC # BLD AUTO: 4.52 M/UL (ref 4–5.4)
WBC # BLD AUTO: 5.5 K/UL (ref 3.9–12.7)

## 2025-03-06 PROCEDURE — 80053 COMPREHEN METABOLIC PANEL: CPT | Performed by: STUDENT IN AN ORGANIZED HEALTH CARE EDUCATION/TRAINING PROGRAM

## 2025-03-06 PROCEDURE — 80061 LIPID PANEL: CPT | Performed by: STUDENT IN AN ORGANIZED HEALTH CARE EDUCATION/TRAINING PROGRAM

## 2025-03-06 PROCEDURE — 84443 ASSAY THYROID STIM HORMONE: CPT | Performed by: STUDENT IN AN ORGANIZED HEALTH CARE EDUCATION/TRAINING PROGRAM

## 2025-03-06 PROCEDURE — 36415 COLL VENOUS BLD VENIPUNCTURE: CPT | Mod: PN | Performed by: STUDENT IN AN ORGANIZED HEALTH CARE EDUCATION/TRAINING PROGRAM

## 2025-03-06 PROCEDURE — 83036 HEMOGLOBIN GLYCOSYLATED A1C: CPT | Performed by: STUDENT IN AN ORGANIZED HEALTH CARE EDUCATION/TRAINING PROGRAM

## 2025-03-06 PROCEDURE — 85025 COMPLETE CBC W/AUTO DIFF WBC: CPT | Performed by: STUDENT IN AN ORGANIZED HEALTH CARE EDUCATION/TRAINING PROGRAM

## 2025-03-06 PROCEDURE — 82306 VITAMIN D 25 HYDROXY: CPT | Performed by: STUDENT IN AN ORGANIZED HEALTH CARE EDUCATION/TRAINING PROGRAM

## 2025-03-07 ENCOUNTER — RESULTS FOLLOW-UP (OUTPATIENT)
Dept: FAMILY MEDICINE | Facility: CLINIC | Age: 75
End: 2025-03-07

## 2025-03-07 LAB
ALBUMIN SERPL BCP-MCNC: 4.1 G/DL (ref 3.5–5.2)
ALP SERPL-CCNC: 111 U/L (ref 40–150)
ALT SERPL W/O P-5'-P-CCNC: 39 U/L (ref 10–44)
ANION GAP SERPL CALC-SCNC: 10 MMOL/L (ref 8–16)
AST SERPL-CCNC: 44 U/L (ref 10–40)
BILIRUB SERPL-MCNC: 0.4 MG/DL (ref 0.1–1)
BUN SERPL-MCNC: 19 MG/DL (ref 8–23)
CALCIUM SERPL-MCNC: 9.5 MG/DL (ref 8.7–10.5)
CHLORIDE SERPL-SCNC: 100 MMOL/L (ref 95–110)
CHOLEST SERPL-MCNC: 452 MG/DL (ref 120–199)
CHOLEST/HDLC SERPL: 4 {RATIO} (ref 2–5)
CO2 SERPL-SCNC: 24 MMOL/L (ref 23–29)
CREAT SERPL-MCNC: 0.9 MG/DL (ref 0.5–1.4)
EST. GFR  (NO RACE VARIABLE): >60 ML/MIN/1.73 M^2
GLUCOSE SERPL-MCNC: 91 MG/DL (ref 70–110)
HDLC SERPL-MCNC: 114 MG/DL (ref 40–75)
HDLC SERPL: 25.2 % (ref 20–50)
LDLC SERPL CALC-MCNC: 320 MG/DL (ref 63–159)
NONHDLC SERPL-MCNC: 338 MG/DL
POTASSIUM SERPL-SCNC: 4.2 MMOL/L (ref 3.5–5.1)
PROT SERPL-MCNC: 7.8 G/DL (ref 6–8.4)
SODIUM SERPL-SCNC: 134 MMOL/L (ref 136–145)
TRIGL SERPL-MCNC: 90 MG/DL (ref 30–150)
TSH SERPL DL<=0.005 MIU/L-ACNC: 1.74 UIU/ML (ref 0.4–4)

## 2025-03-10 ENCOUNTER — PATIENT OUTREACH (OUTPATIENT)
Dept: ADMINISTRATIVE | Facility: HOSPITAL | Age: 75
End: 2025-03-10
Payer: MEDICARE

## 2025-03-10 ENCOUNTER — OFFICE VISIT (OUTPATIENT)
Dept: FAMILY MEDICINE | Facility: CLINIC | Age: 75
End: 2025-03-10
Payer: MEDICARE

## 2025-03-10 VITALS
DIASTOLIC BLOOD PRESSURE: 76 MMHG | HEIGHT: 61 IN | SYSTOLIC BLOOD PRESSURE: 114 MMHG | BODY MASS INDEX: 21.14 KG/M2 | HEART RATE: 70 BPM | OXYGEN SATURATION: 97 % | WEIGHT: 112 LBS

## 2025-03-10 DIAGNOSIS — N30.01 ACUTE CYSTITIS WITH HEMATURIA: ICD-10-CM

## 2025-03-10 DIAGNOSIS — G89.29 CHRONIC PAIN OF LEFT KNEE: Primary | ICD-10-CM

## 2025-03-10 DIAGNOSIS — M25.562 CHRONIC PAIN OF LEFT KNEE: Primary | ICD-10-CM

## 2025-03-10 DIAGNOSIS — R26.89 BALANCE DISORDER: ICD-10-CM

## 2025-03-10 DIAGNOSIS — R94.31 ABNORMAL EKG: ICD-10-CM

## 2025-03-10 DIAGNOSIS — I69.059 HEMIPLEGIA AND HEMIPARESIS FOLLOWING NONTRAUMATIC SUBARACHNOID HEMORRHAGE AFFECTING UNSPECIFIED SIDE: ICD-10-CM

## 2025-03-10 PROCEDURE — 99214 OFFICE O/P EST MOD 30 MIN: CPT | Mod: PBBFAC,PN | Performed by: FAMILY MEDICINE

## 2025-03-10 PROCEDURE — 99999 PR PBB SHADOW E&M-EST. PATIENT-LVL IV: CPT | Mod: PBBFAC,,, | Performed by: FAMILY MEDICINE

## 2025-03-10 PROCEDURE — 99214 OFFICE O/P EST MOD 30 MIN: CPT | Mod: S$PBB,,, | Performed by: FAMILY MEDICINE

## 2025-03-10 PROCEDURE — G2211 COMPLEX E/M VISIT ADD ON: HCPCS | Mod: S$PBB,,, | Performed by: FAMILY MEDICINE

## 2025-03-10 RX ORDER — ALPRAZOLAM 0.25 MG/1
1 TABLET ORAL NIGHTLY PRN
COMMUNITY

## 2025-03-10 NOTE — PROGRESS NOTES
[facility] Patients with Rising Risk of ED/Admission >= 20 Points in 30 Days report    Pt last seen 1.28.2025  Scheduled 3.10.2025

## 2025-03-10 NOTE — PROGRESS NOTES
Chief Complaint:  Chief Complaint   Patient presents with    Follow-up     Hospital visit last week for a uti and chest pain        HPI:  Isela is a 74 y.o. year old     History of Present Illness    CHIEF COMPLAINT:  Isela presents today for follow up after two recent hospital visits    RECENT HOSPITAL VISIT:  She experienced dizziness and felt winded while walking on the [place], which she had not done in a year. She felt well initially but became symptomatic on the return trip. She denies feeling like she was going to pass out, chest pain, shortness of breath, or experiencing dizziness/lightheadedness when going from sitting to standing or during short walks.    RECENT UTI:  She recently experienced a UTI with hematuria. Symptoms began approximately one week prior, with blood in urine and nocturia becoming more noticeable overnight. She completed a course of leftover Macrobid and currently denies any residual bladder symptoms. She reports normal urinary urgency and attributes occasional nighttime frequency to excessive fluid intake before bedtime.    MEDICAL HISTORY:  She has a history of stroke with residual effects in her arm, experiencing soreness with repetitive movements. She also has left knee problems from a previous fracture that required 8 weeks of immobilization. This affects her balance and mobility, causing difficulty with extended walking periods and descending stairs. She experiences knee weakness, particularly when lowering herself, requiring assistance to maintain balance.    SLEEP:  She gets up 1-2 times per night to urinate but falls back asleep easily, often not remembering these interruptions.    GI:  She reports chronic bowel issues managed with magnesium supplements and other remedies to maintain regular bowel movements.    CURRENT MEDICATIONS:  She takes methylated B vitamins.      ROS:  Cardiovascular: no chest pain  Respiratory: no shortness of breath  Gastrointestinal: +change  "in bowel habits  Genitourinary: +frequency, +nocturia  Musculoskeletal: +muscle pain  Neurological: +dizziness, +weakness           Review of Systems   Constitutional:  Negative for fatigue and unexpected weight change.   HENT:  Negative for congestion and postnasal drip.    Respiratory:  Negative for cough and shortness of breath.    Cardiovascular:  Negative for chest pain and palpitations.   Gastrointestinal:  Negative for constipation, diarrhea and nausea.   Genitourinary:  Negative for difficulty urinating and frequency (nighttime x 1-2).   Musculoskeletal:  Negative for gait problem.   Neurological:  Negative for dizziness, light-headedness and headaches.   Psychiatric/Behavioral:  Positive for sleep disturbance.          Past Medical History:  Past Medical History:   Diagnosis Date    Anxiety     Depression     Osteoporosis     recalls intolerant of fosamax with joint pain    Stroke     declined blood thinners         Vitals:  Vitals:    03/10/25 1011   BP: 114/76   Pulse: 70   SpO2: 97%   Weight: 50.8 kg (112 lb)   Height: 5' 1" (1.549 m)   PainSc: 0-No pain       BP Readings from Last 5 Encounters:   03/10/25 114/76   03/03/25 (!) 135/54   02/27/25 138/82   01/28/25 132/82   01/17/25 132/71       The ASCVD Risk score (Magdaleno DK, et al., 2019) failed to calculate for the following reasons:    Risk score cannot be calculated because patient has a medical history suggesting prior/existing ASCVD      Physical Exam:  Physical Exam  Vitals and nursing note reviewed.   Constitutional:       General: She is not in acute distress.     Appearance: Normal appearance. She is well-developed.   HENT:      Head: Normocephalic and atraumatic.   Eyes:      General: No scleral icterus.        Right eye: No discharge.         Left eye: No discharge.      Conjunctiva/sclera: Conjunctivae normal.   Cardiovascular:      Rate and Rhythm: Normal rate and regular rhythm.   Pulmonary:      Effort: Pulmonary effort is normal. No " respiratory distress.      Breath sounds: Normal breath sounds.   Abdominal:      Palpations: Abdomen is soft.      Tenderness: There is no guarding.   Musculoskeletal:         General: No deformity or signs of injury.      Cervical back: Neck supple.   Skin:     General: Skin is warm and dry.      Findings: No rash.   Neurological:      General: No focal deficit present.      Mental Status: She is alert and oriented to person, place, and time.   Psychiatric:         Mood and Affect: Mood normal.         Behavior: Behavior normal.             Assessment & Plan:  Chronic pain of left knee  Comments:  after cleared from cardiology, will let me know for PT orders    Balance disorder    Acute cystitis with hematuria  Comments:  resolved, repeat ua in 2 weeks  Orders:  -     Urinalysis, Reflex to Urine Culture Urine, Clean Catch; Future; Expected date: 03/10/2025    Abnormal EKG  Comments:  appt today to review with cards, would anticipate at least an echo given consistent changes noted at V1, V2    Hemiplegia and hemiparesis following nontraumatic subarachnoid hemorrhage affecting unspecified side  Comments:  stable post-cva         Assessment & Plan    ORDERS:   Urinalysis ordered in 2 weeks    IMPRESSION:  Reviewed recent hospital visits for UTI and dizziness  Noted non-specific EKG changes, possibly due to lead placement or requiring further cardiac evaluation  Assessed patient's current symptoms and activity level  Considered need for further cardiac workup, deferring to Dr. Ewing for cardiology follow-up  Evaluated need for physical therapy for knee issues and balance once cleared by cardiologist    PLAN SUMMARY:   Maintain carnivore-ramsey diet with occasional carbohydrates for energy   Order urinalysis in 2 weeks   Continue methylated B vitamin supplement   Contact office after cardiology appointment for potential physical therapy referral   Follow up in 2 weeks for urinalysis results    PATIENT EDUCATION:    Explained MCH (mean corpuscular hemoglobin) and its relevance in labs   Discussed proper technique for clean catch urine sample collection    ACTION ITEMS/LIFESTYLE:   Isela to continue activity within comfortable limits   Isela to maintain carnivore-ramsey diet with occasional carbohydrate intake as needed for energy    MEDICATIONS:   Continued methylated B vitamin supplement    FOLLOW UP:   Follow up in 2 weeks for urinalysis   Contact office after cardiology appointment with Dr. Ewing for physical therapy referral if cleared         Visit today included increased complexity associated with the care of the episodic problem uti addressed and managing the longitudinal care of the patient due to the serious and/or complex managed problem(s) hemiplegia, cva, knee pain.    This note was generated with the assistance of ambient listening technology. Verbal consent was obtained by the patient and accompanying visitor(s) for the recording of patient appointment to facilitate this note. I attest to having reviewed and edited the generated note for accuracy, though some syntax or spelling errors may persist. Please contact the author of this note for any clarification.

## 2025-03-11 ENCOUNTER — PATIENT MESSAGE (OUTPATIENT)
Dept: FAMILY MEDICINE | Facility: CLINIC | Age: 75
End: 2025-03-11
Payer: MEDICARE

## 2025-03-11 DIAGNOSIS — R26.89 BALANCE DISORDER: ICD-10-CM

## 2025-03-11 DIAGNOSIS — G89.29 CHRONIC PAIN OF LEFT KNEE: Primary | ICD-10-CM

## 2025-03-11 DIAGNOSIS — M25.562 CHRONIC PAIN OF LEFT KNEE: Primary | ICD-10-CM

## 2025-03-13 RX ORDER — FLUCONAZOLE 150 MG/1
150 TABLET ORAL
Qty: 2 TABLET | Refills: 0 | Status: SHIPPED | OUTPATIENT
Start: 2025-03-13

## 2025-03-20 ENCOUNTER — LAB VISIT (OUTPATIENT)
Dept: LAB | Facility: HOSPITAL | Age: 75
End: 2025-03-20
Attending: FAMILY MEDICINE
Payer: MEDICARE

## 2025-03-20 DIAGNOSIS — N30.01 ACUTE CYSTITIS WITH HEMATURIA: ICD-10-CM

## 2025-03-20 LAB
BILIRUB UR QL STRIP: NEGATIVE
CLARITY UR REFRACT.AUTO: CLEAR
COLOR UR AUTO: YELLOW
GLUCOSE UR QL STRIP: NEGATIVE
HGB UR QL STRIP: NEGATIVE
KETONES UR QL STRIP: NEGATIVE
LEUKOCYTE ESTERASE UR QL STRIP: NEGATIVE
NITRITE UR QL STRIP: NEGATIVE
PH UR STRIP: 7 [PH] (ref 5–8)
PROT UR QL STRIP: NEGATIVE
SP GR UR STRIP: 1.01 (ref 1–1.03)
URN SPEC COLLECT METH UR: NORMAL

## 2025-03-20 PROCEDURE — 81003 URINALYSIS AUTO W/O SCOPE: CPT | Performed by: FAMILY MEDICINE

## 2025-03-31 ENCOUNTER — CLINICAL SUPPORT (OUTPATIENT)
Dept: REHABILITATION | Facility: HOSPITAL | Age: 75
End: 2025-03-31
Payer: MEDICARE

## 2025-03-31 DIAGNOSIS — R26.81 GAIT INSTABILITY: ICD-10-CM

## 2025-03-31 DIAGNOSIS — M25.562 CHRONIC PAIN OF LEFT KNEE: ICD-10-CM

## 2025-03-31 DIAGNOSIS — M62.81 MUSCLE WEAKNESS: ICD-10-CM

## 2025-03-31 DIAGNOSIS — R26.89 BALANCE DISORDER: Primary | ICD-10-CM

## 2025-03-31 DIAGNOSIS — G89.29 CHRONIC PAIN OF LEFT KNEE: ICD-10-CM

## 2025-03-31 PROCEDURE — 97162 PT EVAL MOD COMPLEX 30 MIN: CPT | Mod: PN | Performed by: PHYSICAL THERAPIST

## 2025-03-31 PROCEDURE — 97112 NEUROMUSCULAR REEDUCATION: CPT | Mod: PN | Performed by: PHYSICAL THERAPIST

## 2025-03-31 NOTE — PROGRESS NOTES
Outpatient Rehab    Physical Therapy Evaluation    Patient Name: Doris Giraldo  MRN: [id number]  YOB: 1950  Encounter Date: 3/31/2025    Therapy Diagnosis:   Encounter Diagnoses   Name Primary?    Balance disorder Yes    Gait instability     Chronic pain of left knee     Muscle weakness      Physician: Merced Johnson MD    Physician Orders: Eval and Treat  Medical Diagnosis: Chronic pain of left knee  Balance disorder    Visit # / Visits Authorized:  1 / 1  Insurance Authorization Period: 3/11/2025 to 3/11/2026  Date of Evaluation: 3/31/2025  Plan of Care Certification: 3/31/2025 to 5-26-25     Time In: 0209   Time Out: 0303  Total Time: 54   Total Billable Time: 54    Intake Outcome Measure for FOTO Survey    Therapist reviewed FOTO scores for Doris Giraldo on 3/31/2025.   FOTO report - see Media section or FOTO account episode details.     Intake Score: 53%    Precautions     Hx of stroke more affected speech and R hand  osteoporosis and hx of L fx patella       Subjective   History of Present Illness  Doris is a 74 y.o. female who reports to physical therapy with a chief concern of L knee pain and balance issues.     The patient reports a medical diagnosis of Chronic pain of left knee (M25.562, G89.29), Balance disorder (R26.89).    Diagnostic tests related to this condition: X-ray.   X-Ray Details: [id number] of patella with minimal distraction.  Hemarthrosis with fat fluid level.  No dislocation or subluxation.  Mild medial compartment joint space narrowing.  No radiopaque foreign body or gross soft tissue abnormality.    History of Present Condition/Illness: Pt had a fall resulting in nondisplaced fx of patella L 2-21-24  Pt did not require surgery was in a immobilizer splint and then had HHPT and then went to OPPT and was discharged and noticed balance worsening about 1 month ago.  Pt states she develops increased knee pain if does activities such as gardening where she will bend over  and squat partially     Activities of Daily Living  Social history was obtained from Patient.    General Prior Level of Function Comments: no limitations  General Current Level of Function Comments: feeling increased balance issues       Previously independent with activities of daily living? Yes     Currently independent with activities of daily living? Yes          Previously independent with instrumental activities of daily living? Yes     Currently independent with instrumental activities of daily living? Yes              Pain     Patient reports a current pain level of 5/10. Pain at best is reported as 0/10. Pain at worst is reported as 5/10.   Location: L knee  Clinical Progression (since onset): Worsening  Pain Qualities: Aching  Pain-Relieving Factors: Sitting, Rest  Pain-Aggravating Factors: Standing, Movement         Treatment History  Treatments  Previously Received Treatments: Yes  Previous Treatments: Physical therapy  Currently Receiving Treatments: No    Living Arrangements  Living Situation  Housing: Home independently  Living Arrangements: Alone    Home Setup  Type of Structure: House  Home Access: Stairs with rails  Number of Levels in Home: Two level  Patient is able to live on main floor of home: Yes        Employment  Employment Status: Retired   Was homemaker       Past Medical History/Physical Systems Review:   Isela Giraldo  has a past medical history of Anxiety, Depression, Osteoporosis, and Stroke.    Isela Giraldo  has a past surgical history that includes rectal fissure surgery.    Isela has a current medication list which includes the following prescription(s): albuterol, albuterol, albuterol-ipratropium, alprazolam, alprazolam, aspirin, atorvastatin, azelastine, bupropion, clopidogrel, fluconazole, fluticasone propionate, hydrocodone-acetaminophen, prednisone, neilmed sinus rinse complete, and trazodone.    Review of patient's allergies indicates:   Allergen  Reactions    Fosamax [alendronate]      Did not feel right    Iodine and iodide containing products     Ramelteon Other (See Comments)     Melatonen    Shrimp Hives     Hives and itching head to toe        Objective   Posture                 Severe HF, rounded shoulders increased thoracic kyphosis     Lower Extremity Sensation  Right Lumbar/Lower Extremity Sensation  Intact: Light Touch       Left Lumbar/Lower Extremity Sensation  Intact: Light Touch                 Hip Range of Motion    Flexibility testing: hamstrings:     90/90 test R 15 L 15           Knee Range of Motion   Right Knee   Active (deg) Passive (deg) Pain   Flexion 125 125     Extension 0 0         Left Knee   Active (deg) Passive (deg) Pain   Flexion 125 125     Extension 0 5              Ankle/Foot Range of Motion   Right Ankle/Foot   Active (deg) Passive (deg) Pain   Dorsiflexion (KE) 10       Dorsiflexion (KF)         Plantar Flexion         Ankle Inversion         Ankle Eversion         Subtalar Inversion         Subtalar Eversion         Great Toe MTP Flexion         Great Toe MTP Extension         Great Toe IP Flexion             Left Ankle/Foot   Active (deg) Passive (deg) Pain   Dorsiflexion (KE) 10       Dorsiflexion (KF)         Plantar Flexion         Ankle Inversion         Ankle Eversion         Subtalar Inversion         Subtalar Eversion         Great Toe MTP Flexion         Great Toe MTP Extension         Great Toe IP Flexion                            Hip Strength - Planes of Motion   Right Strength Right Pain Left Strength Left  Pain   Flexion (L2) 4   4     Extension 3+   3+     ABduction 4+   4     ADduction 4+   4     Internal Rotation           External Rotation               Hip Strength - Isolated Muscles   Right Strength Right Pain Left Strength Left  Pain   Gluteus Pierce 3-   2+     Gluteus Medius           Gluteus Minimus           Tensor Fasciae Latae             Knee Strength   Right Strength Right Pain Left Strength  Left  Pain   Flexion (S2) 4   4-     Prone Flexion           Extension (L3) 4+   4            Ankle/Foot Strength - Planes of Motion   Right Strength Right Pain Left Strength Left  Pain   Dorsiflexion (L4) 5   5     Plantar Flexion (S1)           Inversion           Eversion 5   5     Great Toe Flexion           Great Toe Extension (L5)           Lesser Toes Flexion           Lesser Toes Extension                     Four Stage Balance Test  Narrow Base of Support: 10 sec sec  Tandem Stand - Right Foot in Front: 10 sec  Tandem Stand - Left Foot in Front: 7 sec        Single Leg Stand - Right Foot: 10 sec  Single Leg Stand - Left Foot: 2 sec       Timed Up & Go (TUG)  Time: 13.13 seconds  Observations: Slow tentative pace  An older adult who takes >=12 seconds to complete the TUG is at risk for falling.          Gait Analysis  Gait Analysis Details  Slow wide based gait increased stance time on L          Treatment:  Balance/Neuromuscular Re-Education  NMR 1: bridge x 10  NMR 2: SLR x 10 small plane L due to c/o hip popping R  NMR 3: pelvic tilt x 10  NMR 4: hip abd SL x 10  NMR 5: Hip add SL x 10    Time Entry(in minutes):  PT Evaluation (Moderate) Time Entry: 34  Neuromuscular Re-Education Time Entry: 20    Assessment & Plan   Assessment  Doris presents with a condition of Moderate complexity.   Presentation of Symptoms: Evolving  Will Comorbidities Impact Care: Yes  Hx of CVA and age     Functional Limitations: Activity tolerance, Ambulating on uneven surfaces, Decreased ambulation distance/endurance, Gait limitations, Increased risk of fall    Patient Goal for Therapy (PT): Improve balance  Prognosis: Good    Plan  From a physical therapy perspective, the patient would benefit from: Skilled Rehab Services    Planned therapy interventions include: Therapeutic exercise, Therapeutic activities, Neuromuscular re-education, Manual therapy, ADLs/IADLs, and Gait training.    Planned modalities to include: Cryotherapy  (cold pack), Electrical stimulation - attended, Electrical stimulation - passive/unattended, and Thermotherapy (hot pack).        Visit Frequency: 2 times Per Week for 8 Weeks.       This plan was discussed with Patient.   Discussion participants: Agreed Upon Plan of Care             Patient's spiritual, cultural, and educational needs considered and patient agreeable to plan of care and goals.     Education  Education was done with Patient. The patient's learning style includes Demonstration, Listening, and Pictures/video. The patient Demonstrates understanding and Verbalizes understanding.         - exercise in pain free zone        Goals:   Active       Long term goals       Increase strength 1 muscle grade         Start:  03/31/25    Expected End:  05/26/25            Improve Toro Balance to level to be determined at next session        Start:  03/31/25    Expected End:  05/26/25            Restore ability to walk for ADL and exercise without increased pain  or fear of falling        Start:  03/31/25    Expected End:  05/26/25            Restore ability to perform ADL's and household activities independently and without increased pain  or fear of falling        Start:  03/31/25    Expected End:  05/26/25            Pt to be independent with HEP to improve mobility, strength, and endurance to be independent with ADL's         Start:  03/31/25    Expected End:  05/26/25               short term goals       Increase strength 1/2 muscle grade        Start:  03/31/25    Expected End:  04/28/25            Improve TUG score to 11 sec or below        Start:  03/31/25    Expected End:  04/28/25            Improve Toro Balance to level to be determined at next session        Start:  03/31/25    Expected End:  04/28/25            Be able to perform HEP with minimal cueing required         Start:  03/31/25    Expected End:  04/28/25            Restore ability to ambulate with normal pain free gait         Start:  03/31/25     Expected End:  04/28/25                Roopa Salinas, PT

## 2025-04-03 ENCOUNTER — CLINICAL SUPPORT (OUTPATIENT)
Dept: REHABILITATION | Facility: HOSPITAL | Age: 75
End: 2025-04-03
Payer: MEDICARE

## 2025-04-03 DIAGNOSIS — R26.89 BALANCE DISORDER: ICD-10-CM

## 2025-04-03 DIAGNOSIS — G89.29 CHRONIC PAIN OF LEFT KNEE: Primary | ICD-10-CM

## 2025-04-03 DIAGNOSIS — R26.81 GAIT INSTABILITY: ICD-10-CM

## 2025-04-03 DIAGNOSIS — M25.562 CHRONIC PAIN OF LEFT KNEE: Primary | ICD-10-CM

## 2025-04-03 DIAGNOSIS — M62.81 MUSCLE WEAKNESS: ICD-10-CM

## 2025-04-03 PROCEDURE — 97530 THERAPEUTIC ACTIVITIES: CPT | Mod: PN | Performed by: PHYSICAL THERAPIST

## 2025-04-03 PROCEDURE — 97112 NEUROMUSCULAR REEDUCATION: CPT | Mod: PN | Performed by: PHYSICAL THERAPIST

## 2025-04-03 NOTE — PROGRESS NOTES
Outpatient Rehab    Physical Therapy Visit    Patient Name: Doris Giraldo  MRN: [id number]  YOB: 1950  Encounter Date: 4/3/2025    Therapy Diagnosis:   Encounter Diagnoses   Name Primary?    Chronic pain of left knee Yes    Balance disorder     Muscle weakness     Gait instability      Physician: Merced Johnson MD    Physician Orders: Eval and Treat  Medical Diagnosis: Chronic pain of left knee  Balance disorder    Visit # / Visits Authorized:  1 / 10  Insurance Authorization Period: 3/31/2025 to 12/31/2025       PT/PTA: PT   Number of PTA visits since last PT visit:0  Time In: 0202   Time Out: 0255  Total Time: 53   Total Billable Time: 53    FOTO:  Intake Score: 53%  Survey Score 1:  %  Survey Score 2:  %    Precautions     Hx of stroke more affected speech and R hand  osteoporosis and hx of L fx patella       Subjective   Was unable to work on HEP due to continuing problems with URI with a lot of coughing Pt states she had upper back pain that she was able to relieve herself Pt states that is now feeling better  Pt states no knee pain currently  Pt reports no falls.  Pain reported as 0/10.      Objective         Toro Balance  Toro Balance Scale  1. Sitting to Standing: Able to stand without using hands and stabilize independently  2. Standing Unsupported: Able to stand safely for 2 minutes  3. Sitting with Back Unsupported but Feet Supported on Floor or on a Stool: Able to sit safely and securely for 2 minutes  4. Standing to Sitting: Sits safely with minimal use of hands  5.  Transfers: Able to transfer safely with minor use of hands  6. Standing Unsupported with Eyes Closed: Able to stand 10 seconds safely  7. Standing Unsupported with Feet Together: Able to place feet together independently and stand 1 minute safely  8. Reach Forward with Outstretched Arm While Standing: Can reach forward confidently 25 cm (10 inches)  9.  Object from Floor from a Standing Position: Able to   "slipper safely and easily  10. Turning to Look Behind Over Left and Right Shoulders While Standing: Looks behind from both sides and weight shifts well  11. Turn 360 Degrees: Able to turn 360 degrees safely in 4 seconds or less  12. Place Alternate Foot on Step or Stool While Standing Unsupported: Able to stand independently and complete 8 steps in greater than 20 seconds  13. Standing Unsupported One Foot in Front: Able to take small step independently and hold 30 seconds  14. Standing on One Leg: Able to lift leg independently and hold 5-10 seconds  Toro Balance Score: 52  Toro Balance Fall Risk: Low    Interpretation:  41-56 = Low Fall Risk  21-40 = Medium Fall Risk  0-20 = High Fall Risk         Treatment:  Balance/Neuromuscular Re-Education  NMR 1: bridge x 10  NMR 2: SLR x 10 able to perform in full plane without pain  NMR 3: pelvic tilt x 10  NMR 4: hip abd SL x 10  NMR 5: Hip add SL x 10  NMR 6: +Hip ext leaning on counter x 10  NMR 7: single leg balance x 30 sec B x 1  NMR 8: Tandem balance on floor x 30 sec  NMR 9: on foam standing, standing eyes closed, feet together,  feet together eyes closed(2 LOB) tandem (1 LOB each) x x 30 sec all  Therapeutic Activity  TA 1: Heel raises x 10 B  Did not add to program pt performing at home with /pt also does squatting ex with   TA 2: Lateral step up/down x 4" x 10 B  TA 4: In// bars march forward/back then side step 2 laps    Time Entry(in minutes):  Neuromuscular Re-Education Time Entry: 43  Therapeutic Activity Time Entry: 10    Assessment & Plan   Assessment: Pt with Toro Balance score of 52/56 low risk for fall.  Pt may benefit from additional balance and strengthening ex to increase confidence with balance with ADL  Evaluation/Treatment Tolerance: Patient tolerated treatment well    Patient will continue to benefit from skilled outpatient physical therapy to address the deficits listed in the problem list box on initial evaluation, provide " pt/family education and to maximize pt's level of independence in the home and community environment.     Patient's spiritual, cultural, and educational needs considered and patient agreeable to plan of care and goals.     Education  Education was done with Patient. The patient's learning style includes Demonstration, Listening, and Pictures/video. The patient Demonstrates understanding and Verbalizes understanding.         - exercise in pain free zone        Plan: Continue with treatment as tolerated as per POC Progress balance work and strength and stab as tolerated    Goals:   Active       Long term goals       Increase strength 1 muscle grade   (Progressing)       Start:  03/31/25    Expected End:  05/26/25            Improve Toro Balance to level to be determined at next session  (Progressing)       Start:  03/31/25    Expected End:  05/26/25       Improve Toro Balance to 55/56         Restore ability to walk for ADL and exercise without increased pain  or fear of falling  (Progressing)       Start:  03/31/25    Expected End:  05/26/25            Restore ability to perform ADL's and household activities independently and without increased pain  or fear of falling  (Progressing)       Start:  03/31/25    Expected End:  05/26/25            Pt to be independent with HEP to improve mobility, strength, and endurance to be independent with ADL's   (Progressing)       Start:  03/31/25    Expected End:  05/26/25               short term goals       Increase strength 1/2 muscle grade  (Progressing)       Start:  03/31/25    Expected End:  04/28/25            Improve TUG score to 11 sec or below  (Progressing)       Start:  03/31/25    Expected End:  04/28/25            Improve Toro Balance to level to be determined at next session  (Progressing)       Start:  03/31/25    Expected End:  04/28/25       Improve Toro Balance to 54/56         Be able to perform HEP with minimal cueing required   (Progressing)       Start:   03/31/25    Expected End:  04/28/25            Restore ability to ambulate with normal pain free gait   (Progressing)       Start:  03/31/25    Expected End:  04/28/25                Roopa Salinas, PT

## 2025-04-08 ENCOUNTER — CLINICAL SUPPORT (OUTPATIENT)
Dept: REHABILITATION | Facility: HOSPITAL | Age: 75
End: 2025-04-08
Payer: MEDICARE

## 2025-04-08 DIAGNOSIS — R26.81 GAIT INSTABILITY: ICD-10-CM

## 2025-04-08 DIAGNOSIS — M62.81 MUSCLE WEAKNESS: ICD-10-CM

## 2025-04-08 DIAGNOSIS — M25.562 CHRONIC PAIN OF LEFT KNEE: Primary | ICD-10-CM

## 2025-04-08 DIAGNOSIS — R26.89 BALANCE DISORDER: ICD-10-CM

## 2025-04-08 DIAGNOSIS — G89.29 CHRONIC PAIN OF LEFT KNEE: Primary | ICD-10-CM

## 2025-04-08 PROCEDURE — 97112 NEUROMUSCULAR REEDUCATION: CPT | Mod: PN | Performed by: PHYSICAL THERAPIST

## 2025-04-08 PROCEDURE — 97110 THERAPEUTIC EXERCISES: CPT | Mod: PN | Performed by: PHYSICAL THERAPIST

## 2025-04-08 PROCEDURE — 97530 THERAPEUTIC ACTIVITIES: CPT | Mod: PN | Performed by: PHYSICAL THERAPIST

## 2025-04-08 NOTE — PROGRESS NOTES
"  Outpatient Rehab    Physical Therapy Visit    Patient Name: Doris Giraldo  MRN: [id number]  YOB: 1950  Encounter Date: 4/8/2025    Therapy Diagnosis:   Encounter Diagnoses   Name Primary?    Chronic pain of left knee Yes    Balance disorder     Muscle weakness     Gait instability      Physician: Merced Johnson MD    Physician Orders: Eval and Treat  Medical Diagnosis: Chronic pain of left knee  Balance disorder    Visit # / Visits Authorized:  2 / 10  Insurance Authorization Period: 3/31/2025 to 12/31/2025  Date of Evaluation: 3/31/2025  Plan of Care Certification: 3/31/2025 to 5-26-25      PT/PTA: PT   Number of PTA visits since last PT visit:0  Time In: 0105   Time Out: 0200  Total Time: 55   Total Billable Time: 55    FOTO:  Intake Score: 53%  Survey Score 1:  %  Survey Score 2:  %    Precautions     Hx of stroke more affected speech and R hand  osteoporosis and hx of L fx patella       Subjective   Pt states she is still suffering with continued coughing and feeling poorly  Pt had her  and had to do work out very lightly Pt states she is having a little rib pain currently  pt states she is concerned about continuing PT right now  After discussion will see pt in 2 weeks.         Objective            Treatment:  Therapeutic Exercise  TE 1: Discussed pt schedule and issues with recent illness and limited ability to participate in PT currently due to not feeling well  Provided pt recommendations for addressing this concern  Balance/Neuromuscular Re-Education  NMR 1: bridge x 10  NMR 2: SLR x 2/10 able to perform in full plane without pain  NMR 3: pelvic tilt with march  x 10  NMR 4: hip abd SL x 15  NMR 5: Hip add SL x 15  NMR 6: Hip ext leaning on counter x 15  NMR 7: single leg balance x 30 sec B x 1  Therapeutic Activity  TA 1: Heel raises x 10 B  Did not add to program pt performing at home with /pt also does squatting ex with   TA 2: Lateral step up/down x 4" x 10 B  TA 3: " sit to stand x 10    Time Entry(in minutes):  Neuromuscular Re-Education Time Entry: 30  Therapeutic Activity Time Entry: 15  Therapeutic Exercise Time Entry: 10    Assessment & Plan   Assessment: Pt still struggling with coughing and to make appt with MD to further assess if more can be done as she recovers  pt feels PT this next week is too much for her schedule and understands to focus on HEP  Evaluation/Treatment Tolerance: Patient tolerated treatment well    Patient will continue to benefit from skilled outpatient physical therapy to address the deficits listed in the problem list box on initial evaluation, provide pt/family education and to maximize pt's level of independence in the home and community environment.     Patient's spiritual, cultural, and educational needs considered and patient agreeable to plan of care and goals.     Education  Education was done with Patient. The patient's learning style includes Demonstration, Listening, and Pictures/video. The patient Demonstrates understanding and Verbalizes understanding.         - exercise in pain free zone        Plan: Continue with treatment as tolerated as per POC Progress balance work and strength and stab as tolerated  Allow further recovery from illness and focus on HEP    Goals:   Active       Long term goals       Increase strength 1 muscle grade   (Progressing)       Start:  03/31/25    Expected End:  05/26/25            Improve Toro Balance to level to be determined at next session  (Progressing)       Start:  03/31/25    Expected End:  05/26/25       Improve Toro Balance to 55/56         Restore ability to walk for ADL and exercise without increased pain  or fear of falling  (Progressing)       Start:  03/31/25    Expected End:  05/26/25            Restore ability to perform ADL's and household activities independently and without increased pain  or fear of falling  (Progressing)       Start:  03/31/25    Expected End:  05/26/25            Pt  to be independent with HEP to improve mobility, strength, and endurance to be independent with ADL's   (Progressing)       Start:  03/31/25    Expected End:  05/26/25               short term goals       Increase strength 1/2 muscle grade  (Progressing)       Start:  03/31/25    Expected End:  04/28/25            Improve TUG score to 11 sec or below  (Progressing)       Start:  03/31/25    Expected End:  04/28/25            Improve Toro Balance to level to be determined at next session  (Progressing)       Start:  03/31/25    Expected End:  04/28/25       Improve Toro Balance to 54/56         Be able to perform HEP with minimal cueing required   (Progressing)       Start:  03/31/25    Expected End:  04/28/25            Restore ability to ambulate with normal pain free gait   (Progressing)       Start:  03/31/25    Expected End:  04/28/25                Roopa Salinas, PT

## 2025-04-10 ENCOUNTER — OFFICE VISIT (OUTPATIENT)
Dept: FAMILY MEDICINE | Facility: CLINIC | Age: 75
End: 2025-04-10
Payer: MEDICARE

## 2025-04-10 VITALS
BODY MASS INDEX: 22.64 KG/M2 | HEART RATE: 75 BPM | OXYGEN SATURATION: 98 % | HEIGHT: 61 IN | DIASTOLIC BLOOD PRESSURE: 62 MMHG | SYSTOLIC BLOOD PRESSURE: 128 MMHG | WEIGHT: 119.94 LBS

## 2025-04-10 DIAGNOSIS — R52 BODY ACHES: ICD-10-CM

## 2025-04-10 DIAGNOSIS — R05.2 SUBACUTE COUGH: ICD-10-CM

## 2025-04-10 DIAGNOSIS — J06.9 UPPER RESPIRATORY TRACT INFECTION, UNSPECIFIED TYPE: ICD-10-CM

## 2025-04-10 DIAGNOSIS — J45.41 MODERATE PERSISTENT ASTHMA WITH ACUTE EXACERBATION: Primary | ICD-10-CM

## 2025-04-10 PROCEDURE — 99999 PR PBB SHADOW E&M-EST. PATIENT-LVL IV: CPT | Mod: PBBFAC,,, | Performed by: NURSE PRACTITIONER

## 2025-04-10 PROCEDURE — 99214 OFFICE O/P EST MOD 30 MIN: CPT | Mod: PBBFAC,PN | Performed by: NURSE PRACTITIONER

## 2025-04-10 PROCEDURE — 99214 OFFICE O/P EST MOD 30 MIN: CPT | Mod: S$PBB,,, | Performed by: NURSE PRACTITIONER

## 2025-04-10 RX ORDER — IPRATROPIUM BROMIDE AND ALBUTEROL SULFATE 2.5; .5 MG/3ML; MG/3ML
3 SOLUTION RESPIRATORY (INHALATION) EVERY 6 HOURS PRN
Qty: 75 ML | Refills: 0 | Status: SHIPPED | OUTPATIENT
Start: 2025-04-10 | End: 2026-04-10

## 2025-04-10 RX ORDER — BUDESONIDE AND FORMOTEROL FUMARATE DIHYDRATE 80; 4.5 UG/1; UG/1
2 AEROSOL RESPIRATORY (INHALATION) 2 TIMES DAILY
Qty: 10.2 G | Refills: 0 | Status: SHIPPED | OUTPATIENT
Start: 2025-04-10 | End: 2026-04-10

## 2025-04-10 RX ORDER — ALBUTEROL SULFATE 90 UG/1
1-2 INHALANT RESPIRATORY (INHALATION) EVERY 6 HOURS PRN
Qty: 6.7 G | Refills: 0 | Status: CANCELLED | OUTPATIENT
Start: 2025-04-10 | End: 2026-04-10

## 2025-04-10 RX ORDER — ALBUTEROL SULFATE 90 UG/1
1 INHALANT RESPIRATORY (INHALATION) EVERY 4 HOURS PRN
Qty: 8.5 G | Refills: 1 | Status: SHIPPED | OUTPATIENT
Start: 2025-04-10

## 2025-04-10 NOTE — PROGRESS NOTES
THIS DOCUMENT WAS MADE IN PART WITH VOICE RECOGNITION SOFTWARE.  OCCASIONALLY THIS SOFTWARE WILL MISINTERPRET WORDS OR PHRASES.     Assessment and Plan:    Moderate persistent asthma with acute exacerbation  Comments:  Advised on symptomatic treatment  Discussed starting Symbicort controller inhaler for better control of symptoms.  Patient would like to avoid oral steroids  Orders:  -     albuterol (PROVENTIL/VENTOLIN HFA) 90 mcg/actuation inhaler; Inhale 1 puff into the lungs every 4 (four) hours as needed for Wheezing.  Dispense: 8.5 g; Refill: 1  -     albuterol-ipratropium (DUO-NEB) 2.5 mg-0.5 mg/3 mL nebulizer solution; Take 3 mLs by nebulization every 6 (six) hours as needed for Wheezing. Rescue  Dispense: 75 mL; Refill: 0  -     budesonide-formoterol 80-4.5 mcg (SYMBICORT) 80-4.5 mcg/actuation HFAA; Inhale 2 puffs into the lungs 2 (two) times a day. Controller  Dispense: 10.2 g; Refill: 0    Upper respiratory tract infection, unspecified type  Comments:  Advised to complete Augmentin as prescribed  Continue with symptomatic treatment-saline rinses, Flonase-add Mucinex.    Subacute cough  -     budesonide-formoterol 80-4.5 mcg (SYMBICORT) 80-4.5 mcg/actuation HFAA; Inhale 2 puffs into the lungs 2 (two) times a day. Controller  Dispense: 10.2 g; Refill: 0    Body aches  Comments:  May take Tylenol as needed for pain                 Follow up if symptoms worsen or fail to improve.   ______________________________________________________________________  Subjective:    Chief Complaint:  URI    HPI:  Isela is a 74 y.o. year old female here concerned regarding cough and congestion for the past 3 weeks- seen in ER twice- seen on March 21st treated with doxycycline, then seen on March 30th- treated with azelastine, Flonase and short course prednisone.  She reports when sinus congestion didn't improve she started Augmentin, which she had left over at home.  She is still taking the Augmentin.  She reports finally  "noticing improvement.  Sinus drainage is now clear, cough also has improved- no longer hacking.  She reports soreness to upper back, chest and abdomen from coughing so much.  Alternating Tylenol and ibuprofen.  She denies fever.  She is requesting refills on her rescue inhaler and nebulizer treatments.       Medications:  Medications Ordered Prior to Encounter[1]    Review of Systems:  Review of Systems   Constitutional:  Negative for fatigue and fever.   HENT:  Positive for congestion, postnasal drip and rhinorrhea. Negative for sore throat.    Respiratory:  Positive for wheezing. Negative for cough and shortness of breath.    Cardiovascular:  Negative for chest pain.   Musculoskeletal:  Positive for arthralgias and back pain.       Past Medical History:  Past Medical History:   Diagnosis Date    Anxiety     Depression     Osteoporosis     recalls intolerant of fosamax with joint pain    Stroke     declined blood thinners       Objective:    Vitals:  Vitals:    04/10/25 1431   BP: 128/62   Pulse: 75   SpO2: 98%   Weight: 54.4 kg (119 lb 14.9 oz)   Height: 5' 1" (1.549 m)   PainSc:   4       Physical Exam  Vitals and nursing note reviewed.   HENT:      Head: Normocephalic and atraumatic.      Right Ear: Tympanic membrane, ear canal and external ear normal. No middle ear effusion.      Left Ear: Tympanic membrane, ear canal and external ear normal.  No middle ear effusion.      Nose: Rhinorrhea present. No mucosal edema. Rhinorrhea is clear.      Right Turbinates: Not swollen or pale.      Left Turbinates: Not swollen or pale.      Right Sinus: No maxillary sinus tenderness or frontal sinus tenderness.      Left Sinus: No maxillary sinus tenderness or frontal sinus tenderness.      Mouth/Throat:      Mouth: Mucous membranes are moist.      Pharynx: Uvula midline. No pharyngeal swelling or posterior oropharyngeal erythema.   Eyes:      Conjunctiva/sclera: Conjunctivae normal.   Neck:      Thyroid: No thyromegaly. "   Cardiovascular:      Rate and Rhythm: Normal rate and regular rhythm.      Heart sounds: Normal heart sounds.   Pulmonary:      Effort: Pulmonary effort is normal.      Breath sounds: Examination of the right-upper field reveals wheezing. Wheezing (Mild expiratory) present.   Musculoskeletal:         General: Normal range of motion.      Cervical back: Normal range of motion and neck supple.   Lymphadenopathy:      Cervical: No cervical adenopathy.   Skin:     General: Skin is warm and dry.   Neurological:      General: No focal deficit present.      Mental Status: She is alert and oriented to person, place, and time.      Cranial Nerves: No cranial nerve deficit.   Psychiatric:         Mood and Affect: Mood normal.         Behavior: Behavior normal.         Thought Content: Thought content normal.         Data:        Medical history reviewed, Medications reconciled.          JOHNIE Echevarria  Family Medicine           [1]   Current Outpatient Medications on File Prior to Visit   Medication Sig Dispense Refill    albuterol (PROVENTIL/VENTOLIN HFA) 90 mcg/actuation inhaler Inhale 1-2 puffs into the lungs every 6 (six) hours as needed for Wheezing or Shortness of Breath. Rescue 6.7 g 0    ALPRAZolam (XANAX) 0.25 MG tablet TAKE 1 TABLET BY MOUTH NIGHTLY AS NEEDED FOR ANXIETY 30 tablet 1    aspirin (ECOTRIN) 81 MG EC tablet Take 1 tablet (81 mg total) by mouth once daily. 30 tablet 11    azelastine (ASTELIN) 137 mcg (0.1 %) nasal spray 1 spray (137 mcg total) by Nasal route 2 (two) times daily. 30 mL 0    buPROPion (WELLBUTRIN XL) 300 MG 24 hr tablet Take 1 tablet (300 mg total) by mouth once daily. 90 tablet 3    fluticasone propionate (FLONASE) 50 mcg/actuation nasal spray 1 spray (50 mcg total) by Each Nostril route 2 (two) times daily as needed for Rhinitis. 15 g 0    HYDROcodone-acetaminophen (NORCO) 7.5-325 mg per tablet Take 1 tablet by mouth every 6 (six) hours as needed for Pain. 12 tablet 0    sod  chlor-bicarb-squeez bottle (NEILMED SINUS RINSE COMPLETE) pkdv 1 packet by Nasal route 4 (four) times daily.      traZODone (DESYREL) 150 MG tablet Take 1 tablet (150 mg total) by mouth every evening. 90 tablet 3    [DISCONTINUED] albuterol 90 mcg/actuation inhaler Inhale 1 puff into the lungs every 4 (four) hours as needed. 8.5 g 1    [DISCONTINUED] albuterol-ipratropium (DUO-NEB) 2.5 mg-0.5 mg/3 mL nebulizer solution Take 3 mLs by nebulization every 6 (six) hours as needed for Wheezing. Rescue 75 mL 0    clopidogreL (PLAVIX) 75 mg tablet Take 1 tablet (75 mg total) by mouth once daily. for 21 days (Patient not taking: Reported on 4/10/2025) 20 tablet 0    fluconazole (DIFLUCAN) 150 MG Tab Take 1 tablet (150 mg total) by mouth every 72 hours. (Patient not taking: Reported on 4/10/2025) 2 tablet 0    [DISCONTINUED] ALPRAZolam (XANAX) 0.25 MG tablet Take 1 tablet by mouth nightly as needed.      [DISCONTINUED] atorvastatin (LIPITOR) 80 MG tablet Take 1 tablet (80 mg total) by mouth once daily. (Patient not taking: Reported on 3/10/2025) 90 tablet 3     No current facility-administered medications on file prior to visit.

## 2025-04-14 ENCOUNTER — PATIENT OUTREACH (OUTPATIENT)
Dept: ADMINISTRATIVE | Facility: HOSPITAL | Age: 75
End: 2025-04-14
Payer: MEDICARE

## 2025-04-14 NOTE — PROGRESS NOTES
[facility] Patients with Rising Risk of ED/Admission >= 20 Points in 30 Days report    Pt last seen 4/10/2025

## 2025-04-24 ENCOUNTER — CLINICAL SUPPORT (OUTPATIENT)
Dept: REHABILITATION | Facility: HOSPITAL | Age: 75
End: 2025-04-24
Payer: MEDICARE

## 2025-04-24 DIAGNOSIS — R26.89 BALANCE DISORDER: ICD-10-CM

## 2025-04-24 DIAGNOSIS — M25.562 CHRONIC PAIN OF LEFT KNEE: Primary | ICD-10-CM

## 2025-04-24 DIAGNOSIS — R26.81 GAIT INSTABILITY: ICD-10-CM

## 2025-04-24 DIAGNOSIS — G89.29 CHRONIC PAIN OF LEFT KNEE: Primary | ICD-10-CM

## 2025-04-24 DIAGNOSIS — M62.81 MUSCLE WEAKNESS: ICD-10-CM

## 2025-04-24 PROCEDURE — 97112 NEUROMUSCULAR REEDUCATION: CPT | Mod: PN | Performed by: PHYSICAL THERAPIST

## 2025-04-24 PROCEDURE — 97530 THERAPEUTIC ACTIVITIES: CPT | Mod: PN | Performed by: PHYSICAL THERAPIST

## 2025-04-24 NOTE — PROGRESS NOTES
Outpatient Rehab    Physical Therapy Visit    Patient Name: Doris Giraldo  MRN: [id number]  YOB: 1950  Encounter Date: 4/24/2025    Therapy Diagnosis:   Encounter Diagnoses   Name Primary?    Chronic pain of left knee Yes    Balance disorder     Muscle weakness     Gait instability      Physician: Merced Johnson MD    Physician Orders: Eval and Treat  Medical Diagnosis: Chronic pain of left knee  Balance disorder    Visit # / Visits Authorized:  3 / 10  Insurance Authorization Period: 3/31/2025 to 12/31/2025  Date of Evaluation: 3/31/2025  Plan of Care Certification: 3/31/2025 to 5-26-25      PT/PTA: PT   Number of PTA visits since last PT visit:0  Time In: 0103   Time Out: 0200  Total Time: 57   Total Billable Time: 57    FOTO:  Intake Score: 53%  Survey Score 1:  %  Survey Score 2:  %    Precautions     Hx of stroke more affected speech and R hand  osteoporosis and hx of L fx patella       Subjective   Pt states she is done with the coughing now Pt states she is still recovering in trunk from coughing but improving and only did ex in standing due to pain lying down  Pt states she feels knee is feeling better and balance is better when wearing glasses and currently has contact lens.  Pain reported as 0/10.      Objective         Four Stage Balance Test  Narrow Base of Support: 10 sec sec  Tandem Stand - Right Foot in Front: 10 sec  Tandem Stand - Left Foot in Front: 10 sec           Single Leg Stand - Left Foot: 10 sec  Pt required assistance to place feet in tandem but able to hold     Timed Up & Go (TUG)  Time: 12.08 seconds     An older adult who takes >=12 seconds to complete the TUG is at risk for falling.          Gait Analysis  Gait Analysis Details  Pt with improved gait, improved stride length and pace slightly improved TUG No longer wide based gait and normal stance on L          Treatment:  Balance/Neuromuscular Re-Education  NMR 1: bridge x 10  NMR 2: SLR x 2/10 able to perform in full  "plane without pain  NMR 3: pelvic tilt with march  x 10  NMR 4: hip abd SL x 15  NMR 5: Hip add SL x 15  NMR 6: Hip ext leaning on counter x 20  NMR 8: Tandem balance on floor x 30 sec  NMR 9: on foam standing, standing eyes closed, feet together,  feet together eyes closed(2 LOB) tandem (1 LOB each) x x 30 sec all  Therapeutic Activity  TA 1: Heel raises x 20 B  TA 2: Lateral step up/down x 4" x 10 B then toe touch forward 4" step x 10 B alternate  TA 3: sit to stand x 10  TA 4: In// bars march forward/back then side step 2 laps    Time Entry(in minutes):  Neuromuscular Re-Education Time Entry: 30  Therapeutic Activity Time Entry: 27    Assessment & Plan   Assessment: Pt with no coughing observed today  Pt with improved balance and mobility with improved balance on L and TUG score which is now in lower level for fall  Pt had not been doing mat ex due to rib pain, able to resume today and appears to understand to resume these ex to help further in muscle strengthening  Evaluation/Treatment Tolerance: Patient tolerated treatment well    Patient will continue to benefit from skilled outpatient physical therapy to address the deficits listed in the problem list box on initial evaluation, provide pt/family education and to maximize pt's level of independence in the home and community environment.     Patient's spiritual, cultural, and educational needs considered and patient agreeable to plan of care and goals.     Education  Education was done with Patient. The patient's learning style includes Demonstration and Listening. The patient Demonstrates understanding and Verbalizes understanding.         - exercise in pain free zone        Plan: Continue with treatment as tolerated as per POC Progress balance work and strength and stab as tolerated  Allow further recovery from illness and focus on HEP  Progress note next session    Goals:   Active       Long term goals       Increase strength 1 muscle grade   (Progressing)  "      Start:  03/31/25    Expected End:  05/26/25            Improve Toro Balance to level to be determined at next session  (Progressing)       Start:  03/31/25    Expected End:  05/26/25       Improve Toro Balance to 55/56         Restore ability to walk for ADL and exercise without increased pain  or fear of falling  (Progressing)       Start:  03/31/25    Expected End:  05/26/25            Restore ability to perform ADL's and household activities independently and without increased pain  or fear of falling  (Progressing)       Start:  03/31/25    Expected End:  05/26/25            Pt to be independent with HEP to improve mobility, strength, and endurance to be independent with ADL's   (Progressing)       Start:  03/31/25    Expected End:  05/26/25               short term goals       Increase strength 1/2 muscle grade  (Progressing)       Start:  03/31/25    Expected End:  04/28/25            Improve TUG score to 11 sec or below  (Progressing)       Start:  03/31/25    Expected End:  04/28/25            Improve Toro Balance to level to be determined at next session  (Progressing)       Start:  03/31/25    Expected End:  04/28/25       Improve Toro Balance to 54/56         Be able to perform HEP with minimal cueing required   (Progressing)       Start:  03/31/25    Expected End:  04/28/25            Restore ability to ambulate with normal pain free gait   (Progressing)       Start:  03/31/25    Expected End:  04/28/25                Roopa Salinas, PT

## 2025-05-01 ENCOUNTER — CLINICAL SUPPORT (OUTPATIENT)
Dept: REHABILITATION | Facility: HOSPITAL | Age: 75
End: 2025-05-01
Attending: PHYSICAL THERAPIST
Payer: MEDICARE

## 2025-05-01 DIAGNOSIS — R26.89 BALANCE DISORDER: ICD-10-CM

## 2025-05-01 DIAGNOSIS — R26.81 GAIT INSTABILITY: ICD-10-CM

## 2025-05-01 DIAGNOSIS — G89.29 CHRONIC PAIN OF LEFT KNEE: Primary | ICD-10-CM

## 2025-05-01 DIAGNOSIS — M62.81 MUSCLE WEAKNESS: ICD-10-CM

## 2025-05-01 DIAGNOSIS — M25.562 CHRONIC PAIN OF LEFT KNEE: Primary | ICD-10-CM

## 2025-05-01 PROCEDURE — 97530 THERAPEUTIC ACTIVITIES: CPT | Mod: PN | Performed by: PHYSICAL THERAPIST

## 2025-05-01 PROCEDURE — 97110 THERAPEUTIC EXERCISES: CPT | Mod: PN | Performed by: PHYSICAL THERAPIST

## 2025-05-01 PROCEDURE — 97112 NEUROMUSCULAR REEDUCATION: CPT | Mod: PN | Performed by: PHYSICAL THERAPIST

## 2025-05-01 NOTE — PROGRESS NOTES
Outpatient Rehab    Physical Therapy Discharge    Patient Name: Doris Giraldo  MRN: [id number]  YOB: 1950  Encounter Date: 5/1/2025    Therapy Diagnosis:   Encounter Diagnoses   Name Primary?    Chronic pain of left knee Yes    Balance disorder     Muscle weakness     Gait instability      Physician: Merced Johnson MD    Physician Orders: Eval and Treat  Medical Diagnosis: Chronic pain of left knee  Balance disorder    Visit # / Visits Authorized:  4 / 10  Insurance Authorization Period: 3/31/2025 to 12/31/2025  Date of Evaluation: 3/31/2025  Plan of Care Certification: 3/31/2025 to 5-26-25      PT/PTA: PT   Number of PTA visits since last PT visit:0  Time In: 0100   Time Out: 0200  Total Time: 60   Total Billable Time: 60    FOTO:  Intake Score: 53%  Survey Score 1: 51%  Survey Score 2: 57%    Precautions     Hx of stroke more affected speech and R hand  osteoporosis and hx of L fx patella       Subjective   Overall feeling pretty good Pt states no falls.         Objective   Posture                 FH less severe FH min rounded shoulders slight increased kyphosis      Hip Range of Motion    Flexibility testing: hamstrings:     90/90 test same as IE  R 15 L 15                     Hip Strength - Planes of Motion   Right Strength Right Pain Left Strength Left  Pain   Flexion (L2) 5   5     Extension 4+   4+     ABduction 5   5     ADduction 5   5     Internal Rotation           External Rotation               Hip Strength - Isolated Muscles   Right Strength Right Pain Left Strength Left  Pain   Gluteus Pierce 4   4     Gluteus Medius           Gluteus Minimus           Tensor Fasciae Latae             Knee Strength   Right Strength Right Pain Left Strength Left  Pain   Flexion (S2) 5   5     Prone Flexion           Extension (L3) 5   5                   Four Stage Balance Test  Narrow Base of Support: 10 sec sec  Tandem Stand - Right Foot in Front: 10 sec  Tandem Stand - Left Foot in Front: 10 sec         Single Leg Stand - Right Foot: 10 sec  Single Leg Stand - Left Foot: 10 sec       Toro Balance  Toro Balance Scale  1. Sitting to Standing: Able to stand without using hands and stabilize independently  2. Standing Unsupported: Able to stand safely for 2 minutes  3. Sitting with Back Unsupported but Feet Supported on Floor or on a Stool: Able to sit safely and securely for 2 minutes  4. Standing to Sitting: Sits safely with minimal use of hands  5.  Transfers: Able to transfer safely with minor use of hands  6. Standing Unsupported with Eyes Closed: Able to stand 10 seconds safely  7. Standing Unsupported with Feet Together: Able to place feet together independently and stand 1 minute safely  8. Reach Forward with Outstretched Arm While Standing: Can reach forward confidently 25 cm (10 inches)  9.  Object from Floor from a Standing Position: Able to  slipper safely and easily  10. Turning to Look Behind Over Left and Right Shoulders While Standing: Looks behind from both sides and weight shifts well  11. Turn 360 Degrees: Able to turn 360 degrees safely in 4 seconds or less  12. Place Alternate Foot on Step or Stool While Standing Unsupported: Able to stand independently and complete 8 steps in greater than 20 seconds  13. Standing Unsupported One Foot in Front: Able to place foot ahead independently and hold 30 seconds  14. Standing on One Leg: Able to lift leg independently and hold greater than 10 seconds  Toro Balance Score: 54  Toro Balance Fall Risk: Low    Interpretation:  41-56 = Low Fall Risk  21-40 = Medium Fall Risk  0-20 = High Fall Risk    Timed Up & Go (TUG)  Time: 10.64 seconds     An older adult who takes >=12 seconds to complete the TUG is at risk for falling.          Gait Analysis  Gait Analysis Details  Normal gait          Treatment:  Therapeutic Exercise  TE 1: Reassessment  Balance/Neuromuscular Re-Education  NMR 1: bridge x 2/10  NMR 2: SLR x 2/10 able to perform in full  "plane without pain  NMR 3: pelvic tilt with march  x 2/10  NMR 4: hip abd SL x 20  NMR 5: Hip add SL x 20  NMR 6: Hip ext leaning on counter x 20  NMR 7: single leg balance x 30 sec B x 1  NMR 9: on foam standing, standing eyes closed, feet together,  feet together eyes closed(2 LOB) tandem (1 LOB each) x x 30 sec all  Therapeutic Activity  TA 1: Heel raises x 20 B  TA 2: Lateral step up/down x 4" x 20 B  TA 3: sit to stand x 10  TA 4: In// bars march forward/back then side step 2 laps    Time Entry(in minutes):  Neuromuscular Re-Education Time Entry: 35  Therapeutic Activity Time Entry: 10  Therapeutic Exercise Time Entry: 15    Assessment & Plan   Assessment: Patient demonstrates improvment in ROM, strength and function.Patient appears independent in the prescribed HEP and ready for discharge after fully achieving the established goals.  Evaluation/Treatment Tolerance: Patient tolerated treatment well    Patient's spiritual, cultural, and educational needs considered and patient agreeable to plan of care and goals.     Education  Education was done with Patient. The patient's learning style includes Demonstration and Listening. The patient Demonstrates understanding and Verbalizes understanding.         - exercise in pain free zone continue work with        Plan: Patient is discharged from physical therapy after fully achieving the established goals.  Thank you for allowing us to assist in the care of your patient.    Goals:   Resolved       Long term goals       Increase strength 1 muscle grade   (Met)       Start:  03/31/25    Expected End:  05/26/25    Resolved:  05/01/25         Improve Toro Balance to level to be determined at next session  (Met)       Start:  03/31/25    Expected End:  05/26/25    Resolved:  05/01/25    Improve Toro Balance to 55/56         Restore ability to walk for ADL and exercise without increased pain  or fear of falling  (Met)       Start:  03/31/25    Expected End:  05/26/25   "  Resolved:  05/01/25         Restore ability to perform ADL's and household activities independently and without increased pain  or fear of falling  (Met)       Start:  03/31/25    Expected End:  05/26/25    Resolved:  05/01/25         Pt to be independent with HEP to improve mobility, strength, and endurance to be independent with ADL's   (Met)       Start:  03/31/25    Expected End:  05/26/25    Resolved:  05/01/25            short term goals       Increase strength 1/2 muscle grade  (Met)       Start:  03/31/25    Expected End:  04/28/25    Resolved:  05/01/25         Improve TUG score to 11 sec or below  (Met)       Start:  03/31/25    Expected End:  04/28/25    Resolved:  05/01/25         Improve Toro Balance to level to be determined at next session  (Met)       Start:  03/31/25    Expected End:  04/28/25    Resolved:  05/01/25    Improve Toro Balance to 54/56         Be able to perform HEP with minimal cueing required   (Met)       Start:  03/31/25    Expected End:  04/28/25    Resolved:  05/01/25         Restore ability to ambulate with normal pain free gait   (Met)       Start:  03/31/25    Expected End:  04/28/25    Resolved:  05/01/25             Roopa Salinas, PT

## 2025-07-15 ENCOUNTER — TELEPHONE (OUTPATIENT)
Dept: FAMILY MEDICINE | Facility: CLINIC | Age: 75
End: 2025-07-15
Payer: MEDICARE

## 2025-07-15 ENCOUNTER — PATIENT OUTREACH (OUTPATIENT)
Dept: ADMINISTRATIVE | Facility: HOSPITAL | Age: 75
End: 2025-07-15
Payer: MEDICARE

## 2025-07-15 DIAGNOSIS — Z00.00 PREVENTATIVE HEALTH CARE: ICD-10-CM

## 2025-07-15 DIAGNOSIS — E55.9 VITAMIN D DEFICIENCY: ICD-10-CM

## 2025-07-15 DIAGNOSIS — R68.89 OTHER GENERAL SYMPTOMS AND SIGNS: ICD-10-CM

## 2025-07-15 DIAGNOSIS — Z13.1 ENCOUNTER FOR SCREENING FOR DIABETES MELLITUS: ICD-10-CM

## 2025-07-15 DIAGNOSIS — R79.9 ABNORMAL FINDING OF BLOOD CHEMISTRY, UNSPECIFIED: ICD-10-CM

## 2025-07-15 DIAGNOSIS — R42 DIZZINESS: ICD-10-CM

## 2025-07-15 DIAGNOSIS — E78.1 PURE HYPERTRIGLYCERIDEMIA: ICD-10-CM

## 2025-07-15 NOTE — PROGRESS NOTES
Pt requesting appt for back pain  Requesting PCP only  Appt scheduled  Pt would like to defer dexa until appt with PCP  Message to PCP to enter labs including Vitamin D

## 2025-07-16 DIAGNOSIS — Z78.0 MENOPAUSE: ICD-10-CM

## 2025-07-22 ENCOUNTER — LAB VISIT (OUTPATIENT)
Dept: LAB | Facility: HOSPITAL | Age: 75
End: 2025-07-22
Attending: FAMILY MEDICINE
Payer: MEDICARE

## 2025-07-22 DIAGNOSIS — F41.9 ANXIETY: ICD-10-CM

## 2025-07-22 DIAGNOSIS — R42 DIZZINESS: ICD-10-CM

## 2025-07-22 DIAGNOSIS — E55.9 VITAMIN D DEFICIENCY: ICD-10-CM

## 2025-07-22 DIAGNOSIS — R79.9 ABNORMAL FINDING OF BLOOD CHEMISTRY, UNSPECIFIED: ICD-10-CM

## 2025-07-22 DIAGNOSIS — E78.1 PURE HYPERTRIGLYCERIDEMIA: ICD-10-CM

## 2025-07-22 DIAGNOSIS — R68.89 OTHER GENERAL SYMPTOMS AND SIGNS: ICD-10-CM

## 2025-07-22 DIAGNOSIS — Z00.00 PREVENTATIVE HEALTH CARE: ICD-10-CM

## 2025-07-22 DIAGNOSIS — Z13.1 ENCOUNTER FOR SCREENING FOR DIABETES MELLITUS: ICD-10-CM

## 2025-07-22 LAB
25(OH)D3+25(OH)D2 SERPL-MCNC: 77 NG/ML (ref 30–96)
ABSOLUTE EOSINOPHIL (OHS): 0.36 K/UL
ABSOLUTE MONOCYTE (OHS): 0.44 K/UL (ref 0.3–1)
ABSOLUTE NEUTROPHIL COUNT (OHS): 2.51 K/UL (ref 1.8–7.7)
ALBUMIN SERPL BCP-MCNC: 4.2 G/DL (ref 3.5–5.2)
ALP SERPL-CCNC: 112 UNIT/L (ref 40–150)
ALT SERPL W/O P-5'-P-CCNC: 25 UNIT/L (ref 10–44)
ANION GAP (OHS): 11 MMOL/L (ref 8–16)
AST SERPL-CCNC: 23 UNIT/L (ref 11–45)
BASOPHILS # BLD AUTO: 0.05 K/UL
BASOPHILS NFR BLD AUTO: 1 %
BILIRUB SERPL-MCNC: 0.3 MG/DL (ref 0.1–1)
BUN SERPL-MCNC: 19 MG/DL (ref 8–23)
CALCIUM SERPL-MCNC: 9.4 MG/DL (ref 8.7–10.5)
CHLORIDE SERPL-SCNC: 102 MMOL/L (ref 95–110)
CHOLEST SERPL-MCNC: 423 MG/DL (ref 120–199)
CHOLEST/HDLC SERPL: 3.7 {RATIO} (ref 2–5)
CO2 SERPL-SCNC: 24 MMOL/L (ref 23–29)
CREAT SERPL-MCNC: 0.9 MG/DL (ref 0.5–1.4)
EAG (OHS): 100 MG/DL (ref 68–131)
ERYTHROCYTE [DISTWIDTH] IN BLOOD BY AUTOMATED COUNT: 12.9 % (ref 11.5–14.5)
GFR SERPLBLD CREATININE-BSD FMLA CKD-EPI: >60 ML/MIN/1.73/M2
GLUCOSE SERPL-MCNC: 84 MG/DL (ref 70–110)
HBA1C MFR BLD: 5.1 % (ref 4–5.6)
HCT VFR BLD AUTO: 42.8 % (ref 37–48.5)
HDLC SERPL-MCNC: 115 MG/DL (ref 40–75)
HDLC SERPL: 27.2 % (ref 20–50)
HGB BLD-MCNC: 14 GM/DL (ref 12–16)
IMM GRANULOCYTES # BLD AUTO: 0.01 K/UL (ref 0–0.04)
IMM GRANULOCYTES NFR BLD AUTO: 0.2 % (ref 0–0.5)
LDLC SERPL CALC-MCNC: 293.4 MG/DL (ref 63–159)
LYMPHOCYTES # BLD AUTO: 1.61 K/UL (ref 1–4.8)
MCH RBC QN AUTO: 31.2 PG (ref 27–31)
MCHC RBC AUTO-ENTMCNC: 32.7 G/DL (ref 32–36)
MCV RBC AUTO: 95 FL (ref 82–98)
NONHDLC SERPL-MCNC: 308 MG/DL
NUCLEATED RBC (/100WBC) (OHS): 0 /100 WBC
PLATELET # BLD AUTO: 345 K/UL (ref 150–450)
PMV BLD AUTO: 9.9 FL (ref 9.2–12.9)
POTASSIUM SERPL-SCNC: 4.3 MMOL/L (ref 3.5–5.1)
PROT SERPL-MCNC: 7.3 GM/DL (ref 6–8.4)
RBC # BLD AUTO: 4.49 M/UL (ref 4–5.4)
RELATIVE EOSINOPHIL (OHS): 7.2 %
RELATIVE LYMPHOCYTE (OHS): 32.3 % (ref 18–48)
RELATIVE MONOCYTE (OHS): 8.8 % (ref 4–15)
RELATIVE NEUTROPHIL (OHS): 50.5 % (ref 38–73)
SODIUM SERPL-SCNC: 137 MMOL/L (ref 136–145)
TRIGL SERPL-MCNC: 73 MG/DL (ref 30–150)
TSH SERPL-ACNC: 1.74 UIU/ML (ref 0.4–4)
WBC # BLD AUTO: 4.98 K/UL (ref 3.9–12.7)

## 2025-07-22 PROCEDURE — 82306 VITAMIN D 25 HYDROXY: CPT

## 2025-07-22 PROCEDURE — 80061 LIPID PANEL: CPT

## 2025-07-22 PROCEDURE — 84443 ASSAY THYROID STIM HORMONE: CPT

## 2025-07-22 PROCEDURE — 80053 COMPREHEN METABOLIC PANEL: CPT

## 2025-07-22 PROCEDURE — 83036 HEMOGLOBIN GLYCOSYLATED A1C: CPT

## 2025-07-22 PROCEDURE — 36415 COLL VENOUS BLD VENIPUNCTURE: CPT | Mod: PN

## 2025-07-22 PROCEDURE — 85025 COMPLETE CBC W/AUTO DIFF WBC: CPT

## 2025-07-23 RX ORDER — BUPROPION HYDROCHLORIDE 300 MG/1
300 TABLET ORAL
Qty: 90 TABLET | Refills: 1 | Status: SHIPPED | OUTPATIENT
Start: 2025-07-23

## 2025-07-23 NOTE — TELEPHONE ENCOUNTER
No care due was identified.  [facility] Embedded Care Due Messages. Reference number: [id number].   7/22/2025 10:59:02 PM CDT

## 2025-07-30 ENCOUNTER — TELEPHONE (OUTPATIENT)
Dept: FAMILY MEDICINE | Facility: CLINIC | Age: 75
End: 2025-07-30
Payer: MEDICARE

## 2025-07-30 NOTE — TELEPHONE ENCOUNTER
Return call to patient to stating lab results. Patient would like labs to be mailed to her address. I did state to patient that she would have to contact medical records or she can come into clinic for viewing of labs. Patient verbally understand further instructions.

## 2025-07-30 NOTE — TELEPHONE ENCOUNTER
----- Message from Merced Johnson MD sent at 7/29/2025  5:24 PM CDT -----  Please let pt know that labs were stable.  Persistent elevation to cholesterol levels, otherwise no issues.  ----- Message -----  From: Lab, Background User  Sent: 7/22/2025   6:09 PM CDT  To: Merced Johnson MD

## 2025-08-11 ENCOUNTER — OFFICE VISIT (OUTPATIENT)
Dept: FAMILY MEDICINE | Facility: CLINIC | Age: 75
End: 2025-08-11
Payer: MEDICARE

## 2025-08-11 VITALS
DIASTOLIC BLOOD PRESSURE: 72 MMHG | BODY MASS INDEX: 22.64 KG/M2 | SYSTOLIC BLOOD PRESSURE: 126 MMHG | RESPIRATION RATE: 18 BRPM | WEIGHT: 119.94 LBS | TEMPERATURE: 99 F | HEIGHT: 61 IN

## 2025-08-11 DIAGNOSIS — N39.3 STRESS INCONTINENCE: ICD-10-CM

## 2025-08-11 DIAGNOSIS — M62.838 MUSCLE SPASM: Primary | ICD-10-CM

## 2025-08-11 DIAGNOSIS — M54.9 MID BACK PAIN: ICD-10-CM

## 2025-08-11 DIAGNOSIS — G47.00 INSOMNIA, UNSPECIFIED TYPE: ICD-10-CM

## 2025-08-11 DIAGNOSIS — Z12.11 SPECIAL SCREENING FOR MALIGNANT NEOPLASMS, COLON: ICD-10-CM

## 2025-08-11 LAB
BILIRUB UR QL STRIP.AUTO: NEGATIVE
CLARITY UR: CLEAR
COLOR UR AUTO: YELLOW
GLUCOSE UR QL STRIP: NEGATIVE
HGB UR QL STRIP: NEGATIVE
KETONES UR QL STRIP: NEGATIVE
LEUKOCYTE ESTERASE UR QL STRIP: NEGATIVE
NITRITE UR QL STRIP: NEGATIVE
PH UR STRIP: 7 [PH]
PROT UR QL STRIP: NEGATIVE
SP GR UR STRIP: 1.02
UROBILINOGEN UR STRIP-ACNC: NEGATIVE EU/DL

## 2025-08-11 PROCEDURE — 99214 OFFICE O/P EST MOD 30 MIN: CPT | Mod: PBBFAC,PN | Performed by: FAMILY MEDICINE

## 2025-08-11 PROCEDURE — 99214 OFFICE O/P EST MOD 30 MIN: CPT | Mod: S$PBB,,, | Performed by: FAMILY MEDICINE

## 2025-08-11 PROCEDURE — 81003 URINALYSIS AUTO W/O SCOPE: CPT | Performed by: FAMILY MEDICINE

## 2025-08-11 PROCEDURE — 99999 PR PBB SHADOW E&M-EST. PATIENT-LVL IV: CPT | Mod: PBBFAC,,, | Performed by: FAMILY MEDICINE

## 2025-08-11 PROCEDURE — G2211 COMPLEX E/M VISIT ADD ON: HCPCS | Mod: ,,, | Performed by: FAMILY MEDICINE

## 2025-08-11 RX ORDER — TRAZODONE HYDROCHLORIDE 150 MG/1
150 TABLET ORAL NIGHTLY
Qty: 90 TABLET | Refills: 3 | Status: SHIPPED | OUTPATIENT
Start: 2025-08-11

## 2025-08-12 LAB — HOLD SPECIMEN: NORMAL

## 2025-08-27 DIAGNOSIS — F41.9 ANXIETY: ICD-10-CM

## 2025-08-28 ENCOUNTER — LAB VISIT (OUTPATIENT)
Dept: LAB | Facility: HOSPITAL | Age: 75
End: 2025-08-28
Payer: MEDICARE

## 2025-08-28 DIAGNOSIS — Z12.11 SPECIAL SCREENING FOR MALIGNANT NEOPLASMS, COLON: ICD-10-CM

## 2025-08-28 PROCEDURE — 82274 ASSAY TEST FOR BLOOD FECAL: CPT

## 2025-08-28 RX ORDER — ALPRAZOLAM 0.25 MG/1
0.25 TABLET ORAL NIGHTLY PRN
Qty: 30 TABLET | Refills: 3 | Status: SHIPPED | OUTPATIENT
Start: 2025-08-28

## 2025-08-29 LAB — OB PNL STL IA: NEGATIVE
